# Patient Record
Sex: FEMALE | Race: WHITE | Employment: OTHER | URBAN - METROPOLITAN AREA
[De-identification: names, ages, dates, MRNs, and addresses within clinical notes are randomized per-mention and may not be internally consistent; named-entity substitution may affect disease eponyms.]

---

## 2018-09-17 ENCOUNTER — OFFICE VISIT (OUTPATIENT)
Dept: UROLOGY | Age: 63
End: 2018-09-17
Payer: MEDICARE

## 2018-09-17 VITALS
DIASTOLIC BLOOD PRESSURE: 84 MMHG | HEIGHT: 62 IN | BODY MASS INDEX: 29.81 KG/M2 | HEART RATE: 68 BPM | SYSTOLIC BLOOD PRESSURE: 138 MMHG | TEMPERATURE: 97.6 F | WEIGHT: 162 LBS

## 2018-09-17 DIAGNOSIS — R33.9 INCOMPLETE EMPTYING OF BLADDER: ICD-10-CM

## 2018-09-17 DIAGNOSIS — R35.0 FREQUENCY OF URINATION: ICD-10-CM

## 2018-09-17 DIAGNOSIS — R39.11 HESITANCY: Primary | ICD-10-CM

## 2018-09-17 DIAGNOSIS — R39.15 URGENCY OF URINATION: ICD-10-CM

## 2018-09-17 PROCEDURE — 51798 US URINE CAPACITY MEASURE: CPT | Performed by: UROLOGY

## 2018-09-17 PROCEDURE — 99204 OFFICE O/P NEW MOD 45 MIN: CPT | Performed by: UROLOGY

## 2018-09-17 RX ORDER — TAMSULOSIN HYDROCHLORIDE 0.4 MG/1
0.4 CAPSULE ORAL DAILY
Qty: 30 CAPSULE | Refills: 3 | Status: SHIPPED | OUTPATIENT
Start: 2018-09-17 | End: 2019-01-07 | Stop reason: SDUPTHER

## 2018-09-17 RX ORDER — AMOXICILLIN AND CLAVULANATE POTASSIUM 875; 125 MG/1; MG/1
1 TABLET, FILM COATED ORAL 2 TIMES DAILY
COMMUNITY
End: 2019-08-13

## 2018-09-17 ASSESSMENT — ENCOUNTER SYMPTOMS
WHEEZING: 0
BACK PAIN: 1
COLOR CHANGE: 0
COUGH: 0
EYE REDNESS: 0
EYE PAIN: 0
SHORTNESS OF BREATH: 0
ABDOMINAL PAIN: 0
NAUSEA: 0

## 2018-09-17 NOTE — PROGRESS NOTES
250 MG DR tablet Take 250 mg by mouth 3 times daily      vilazodone HCl (VILAZODONE HCL) 40 MG TABS Take 40 mg by mouth daily      gabapentin (NEURONTIN) 300 MG capsule       butalbital-acetaminophen-caffeine (FIORICET, ESGIC) per tablet Take 1 tablet by mouth every 4 hours as needed.  L-Lysine 500 MG CAPS Take  by mouth.  vitamin E 400 UNIT capsule Take 400 Units by mouth daily.  Multiple Vitamins-Minerals (THERAPEUTIC MULTIVITAMIN-MINERALS) tablet Take 1 tablet by mouth daily.  Dextromethorphan-Guaifenesin (MUCINEX DM MAXIMUM STRENGTH)  MG TB12 Take  by mouth.  amitriptyline (ELAVIL) 10 MG tablet Take 10 mg by mouth nightly as needed.  HYDROcodone-acetaminophen (VICODIN) 5-500 MG per tablet Take 1 tablet by mouth daily as needed.  naproxen (NAPROSYN) 500 MG tablet Take 500 mg by mouth daily as needed.  SUMAtriptan (IMITREX) 100 MG tablet Take 100 mg by mouth once as needed.  ALPRAZolam (XANAX) 0.5 MG tablet Take 0.5 mg by mouth as needed.  hydrOXYzine (ATARAX) 25 MG tablet Take 25 mg by mouth as needed.  zolpidem (AMBIEN) 10 MG tablet Take 10 mg by mouth nightly as needed.  atorvastatin (LIPITOR) 40 MG tablet Take 40 mg by mouth daily.  montelukast (SINGULAIR) 10 MG tablet Take 10 mg by mouth nightly. Latex; Benadryl [diphenhydramine]; Demerol hcl [meperidine]; Percocet [oxycodone-acetaminophen]; Seroquel [quetiapine fumarate]; and Adhesive tape  History   Smoking Status    Never Smoker   Smokeless Tobacco    Never Used      (If patient a smoker, smoking cessation counseling offered)   History   Alcohol Use No       REVIEW OF SYSTEMS:  Review of Systems    Physical Exam:    This a 70 y.o. female      Vitals:    09/17/18 0903   BP: 138/84   Pulse: 68   Temp: 97.6 °F (36.4 °C)     Body mass index is 29.63 kg/m².   Physical Exam  Constitutional: Patient in no acute distress, ggod grooming, appropriately dressed  Neuro: Alert and

## 2018-10-10 ENCOUNTER — PROCEDURE VISIT (OUTPATIENT)
Dept: UROLOGY | Age: 63
End: 2018-10-10
Payer: MEDICARE

## 2018-10-10 VITALS
BODY MASS INDEX: 29.08 KG/M2 | HEART RATE: 89 BPM | HEIGHT: 62 IN | TEMPERATURE: 99.1 F | WEIGHT: 158 LBS | DIASTOLIC BLOOD PRESSURE: 65 MMHG | SYSTOLIC BLOOD PRESSURE: 110 MMHG

## 2018-10-10 DIAGNOSIS — R39.11 HESITANCY: Primary | ICD-10-CM

## 2018-10-10 DIAGNOSIS — R33.9 INCOMPLETE EMPTYING OF BLADDER: ICD-10-CM

## 2018-10-10 DIAGNOSIS — K59.00 CONSTIPATION, UNSPECIFIED CONSTIPATION TYPE: ICD-10-CM

## 2018-10-10 DIAGNOSIS — R39.16 URINARY STRAINING: ICD-10-CM

## 2018-10-10 PROCEDURE — 51741 ELECTRO-UROFLOWMETRY FIRST: CPT | Performed by: UROLOGY

## 2018-10-10 PROCEDURE — 99999 PR OFFICE/OUTPT VISIT,PROCEDURE ONLY: CPT | Performed by: UROLOGY

## 2018-10-10 PROCEDURE — 51728 CYSTOMETROGRAM W/VP: CPT | Performed by: UROLOGY

## 2018-10-10 PROCEDURE — 51784 ANAL/URINARY MUSCLE STUDY: CPT | Performed by: UROLOGY

## 2018-10-10 PROCEDURE — 51797 INTRAABDOMINAL PRESSURE TEST: CPT | Performed by: UROLOGY

## 2018-10-10 RX ORDER — HYDROCHLOROTHIAZIDE 12.5 MG/1
12.5 CAPSULE, GELATIN COATED ORAL DAILY
COMMUNITY
End: 2019-08-13

## 2018-10-10 RX ORDER — LANOLIN ALCOHOL/MO/W.PET/CERES
1 CREAM (GRAM) TOPICAL
COMMUNITY

## 2018-10-10 NOTE — PROGRESS NOTES
Here for Urodynamic testing for hesitancy, difficulty starting her stream, and has to strain and bend over to touch her toes to empty her bladder. Voids every 2-3 hours, nocturia 0-1. Post void dribbling- no pad no other incontinence. Hx Hyst at age 26, was a HRT for a short time, had bladder and rectal repair. Has pain and burning with intercourse. Is constipated, started Miralax this morning. Started Flomax- 9/17/18. Uroflow:  Voided 82 ml. Max flow 4 ml/min. PVR- 20 ml. Urethral and rectal pressure cath placed, gera-rectal EMG patches placed. Urethral caruncle noted, atrophic vaginitis. 1st fill: 40 ml/min  First sensation:91 ml  First desire: 102ml  Strong desire: 117 ml  Capacity: 155 ml. Voided 149 ml, max flow 8 ml/sec, average flow 5 ml/sec, 1 ml PVR. Max detrusor recording 125 cmH2O. Stress at 118 ml produced no leak. 2nd fill:40 ml/min  First sensation: 62 ml  First desire: 99 ml  Strong desire: 145 ml  Capacity: 185 ml. Voided 237 ml, max flow 14 ml/sec, average 8 ml/sec, max Detrusor recording 146 cm H2O. Stress at 126 ml produced no leak.

## 2018-10-15 ENCOUNTER — PROCEDURE VISIT (OUTPATIENT)
Dept: UROLOGY | Age: 63
End: 2018-10-15
Payer: MEDICARE

## 2018-10-15 VITALS
WEIGHT: 158 LBS | BODY MASS INDEX: 29.08 KG/M2 | HEART RATE: 96 BPM | DIASTOLIC BLOOD PRESSURE: 69 MMHG | SYSTOLIC BLOOD PRESSURE: 107 MMHG | TEMPERATURE: 98.4 F | HEIGHT: 62 IN

## 2018-10-15 DIAGNOSIS — R33.9 RETENTION OF URINE: Primary | ICD-10-CM

## 2018-10-15 PROCEDURE — 52000 CYSTOURETHROSCOPY: CPT | Performed by: UROLOGY

## 2018-10-15 PROCEDURE — 99999 PR OFFICE/OUTPT VISIT,PROCEDURE ONLY: CPT | Performed by: UROLOGY

## 2019-01-07 DIAGNOSIS — R33.9 INCOMPLETE EMPTYING OF BLADDER: ICD-10-CM

## 2019-01-07 DIAGNOSIS — R39.11 HESITANCY: ICD-10-CM

## 2019-01-07 RX ORDER — TAMSULOSIN HYDROCHLORIDE 0.4 MG/1
CAPSULE ORAL
Qty: 30 CAPSULE | Refills: 2 | Status: SHIPPED | OUTPATIENT
Start: 2019-01-07 | End: 2019-04-15 | Stop reason: SDUPTHER

## 2019-04-15 DIAGNOSIS — R33.9 INCOMPLETE EMPTYING OF BLADDER: ICD-10-CM

## 2019-04-15 DIAGNOSIS — R39.11 HESITANCY: ICD-10-CM

## 2019-04-15 NOTE — TELEPHONE ENCOUNTER
Patient had to cancel appointment today due to insurance. All information faxed to Lakesha Tejada at [facility] office, patient will run out of medication soon.

## 2019-04-19 RX ORDER — TAMSULOSIN HYDROCHLORIDE 0.4 MG/1
CAPSULE ORAL
Qty: 30 CAPSULE | Refills: 5 | Status: SHIPPED | OUTPATIENT
Start: 2019-04-19 | End: 2020-11-06 | Stop reason: ALTCHOICE

## 2019-10-07 DIAGNOSIS — R33.9 INCOMPLETE EMPTYING OF BLADDER: ICD-10-CM

## 2019-10-07 DIAGNOSIS — R39.11 HESITANCY: ICD-10-CM

## 2019-10-07 RX ORDER — TAMSULOSIN HYDROCHLORIDE 0.4 MG/1
CAPSULE ORAL
Qty: 30 CAPSULE | Refills: 4 | OUTPATIENT
Start: 2019-10-07

## 2019-10-11 DIAGNOSIS — R33.9 INCOMPLETE EMPTYING OF BLADDER: ICD-10-CM

## 2019-10-11 DIAGNOSIS — R39.11 HESITANCY: ICD-10-CM

## 2019-10-11 RX ORDER — TAMSULOSIN HYDROCHLORIDE 0.4 MG/1
CAPSULE ORAL
Qty: 30 CAPSULE | Refills: 4 | OUTPATIENT
Start: 2019-10-11

## 2020-03-25 PROBLEM — K21.9 GERD (GASTROESOPHAGEAL REFLUX DISEASE): Status: RESOLVED | Noted: 2020-03-25 | Resolved: 2020-03-24

## 2020-04-27 ENCOUNTER — OFFICE VISIT (OUTPATIENT)
Dept: PODIATRY | Age: 65
End: 2020-04-27
Payer: MEDICARE

## 2020-04-27 VITALS — BODY MASS INDEX: 27.88 KG/M2 | WEIGHT: 142 LBS | HEIGHT: 60 IN

## 2020-04-27 PROCEDURE — 99203 OFFICE O/P NEW LOW 30 MIN: CPT | Performed by: PODIATRIST

## 2020-04-27 RX ORDER — GABAPENTIN 300 MG/1
100 CAPSULE ORAL DAILY
COMMUNITY
Start: 2020-04-06 | End: 2022-08-17 | Stop reason: DRUGHIGH

## 2020-04-27 ASSESSMENT — ENCOUNTER SYMPTOMS
COLOR CHANGE: 0
NAUSEA: 0
BACK PAIN: 1
DIARRHEA: 0
VOMITING: 0
CONSTIPATION: 0

## 2020-04-27 NOTE — PROGRESS NOTES
[facility]  New Patient History and Physical    Chief Complaint:   Chief Complaint   Patient presents with    Foot Pain     LEFT HALLUX NAILS BECOME PAINFUL/ SHE DID BUMP FOOT AND INJURED LEFT FOOT A FEW YEARS BACK    Other     PCP: LAST VISIT DR Vinicius Gonzales 03/02/2020       HPI: Sarah Gonzalez is a 77 y.o. female who presents to the office today complaining of several things    1) thick, discolored toenail left hallux, has had injury. Some pain, bought some otc medication, has not used it yet  2) pain left 3rd toe, swells, discolored, no trauma. Pain in shoes, tried a toe cap  3) pain right hallux, had a fracture, treated conservatively, feels stiff, some pain. 4) itchy spot plantar left foot, comes and goes. .  Currently denies F/C/N/V. Allergies   Allergen Reactions    Latex     Nickel     Benadryl [Diphenhydramine]     Cefadroxil     Cephalexin Itching    Demerol Hcl [Meperidine]     Erythromycin      Other reaction(s): Unknown (qualifier value)    Lansoprazole      Other reaction(s): Unknown (qualifier value)    Lomefloxacin      Other reaction(s): Unknown (qualifier value)    Loracarbef      Other reaction(s): Unknown (qualifier value)    Meperidine Hcl      Other reaction(s): Hallucinations    Other Other (See Comments)    Percocet [Oxycodone-Acetaminophen]     Pneumococcal Vaccines Other (See Comments)    Seroquel [Quetiapine Fumarate]     Adhesive Tape Rash       Past Medical History:   Diagnosis Date    Abdominal pain     Asthma     Benign hypertension with CKD [facility]chronic kidney disease) stage III ([facility])     CKD (chronic kidney disease) stage 3, GFR 30-59 ml/min ([facility])     GERD (gastroesophageal reflux disease)     Hyperlipidemia     Hypothyroidism     MVP (mitral valve prolapse)     Rectal bleeding     Scarlet fever     Sjogren - Ragini's syndrome        Prior to Admission medications    Medication Sig Start Date End Date Taking?  Authorizing Provider bilateral    Integument: Warm, dry, supple, Bilateral.  Left hallux nail thick, discolored, loose distal Dry scaly patch of hyperkeratotic skin plantar left foot. Radiographs: 3 views right Foot:  Hallux abducto valgus deformity. Degenerative changes at the medial hallux interphalangeal joint. No acute pathology. Radiographs: 3 views left Foot:  Hallux abducto valgus deformity. cystic changes at the distal interphalangeal joint of the left third toe. Asessment: Patient is a 77 y.o. female with:   1. Pain in both feet    2. Onychomycosis    3. Pain of great toe, left    4. Contusion of toe, sequela    5. Osteoarthritis of toe joint, right    6. Osteoarthritis of toe joint, left    7. Digital mucinous cyst of toe of left foot    8. Chronic eczema of foot          Plan: Patient examined and evaluated. Current condition and treatment options discussed in detail. Advised pt to wear good shoes. Verbal and written instructions given to patient. Contact office with any questions/problems/concerns. 1) will use OTC topical for left hallux nail. Debrided nail  2) will try diclofenac gel to left 3rd toe. 3) toe crest discussed for left 3rd toe  4) Rx Lidex cream for left foot    Discussed arthroplasty of the DPJ of the left 3rd toe. Orders Placed This Encounter   Procedures    XR FOOT LEFT (MIN 3 VIEWS)    XR FOOT RIGHT (MIN 3 VIEWS)     Orders Placed This Encounter   Medications    diclofenac sodium (VOLTAREN) 1 % GEL     Sig: Apply 4 g topically 4 times daily     Dispense:  5 Tube     Refill:  0    fluocinonide (LIDEX) 0.05 % cream     Sig: Apply topically 2 times daily.      Dispense:  30 g     Refill:  0        RTC in 4week(s).    4/27/2020

## 2020-06-09 ENCOUNTER — OFFICE VISIT (OUTPATIENT)
Dept: PODIATRY | Age: 65
End: 2020-06-09
Payer: MEDICARE

## 2020-06-09 VITALS — HEIGHT: 60 IN | BODY MASS INDEX: 27.88 KG/M2 | WEIGHT: 142 LBS

## 2020-06-09 PROCEDURE — 99213 OFFICE O/P EST LOW 20 MIN: CPT | Performed by: PODIATRIST

## 2020-06-09 ASSESSMENT — ENCOUNTER SYMPTOMS
VOMITING: 0
BACK PAIN: 1
COLOR CHANGE: 0
CONSTIPATION: 0
DIARRHEA: 0
NAUSEA: 0

## 2020-06-09 NOTE — PROGRESS NOTES
[address] [place] [address] Podiatry  Return Patient History and Physical    Chief Complaint:   Chief Complaint   Patient presents with   Lucie Minder     recheck left foot/Lidex cream and diclofenac follow up, not much help       HPI: Bo Mann is a 77 y.o. female who presents to the office today complaining of several things    1) thick, discolored toenail left hallux, using topical, old nail fell off, doing well. has had injury. Some pain, bought some otc medication, has not used it yet  2) pain left 3rd toe, using topical, no better. swells, discolored, no trauma. Pain in shoes, tried a toe cap  3) pain right hallux, no change, tolerable. had a fracture, treated conservatively, feels stiff, some pain. 4) itchy spot plantar left foot, comes and goes-cream helps. .  Currently denies F/C/N/V. Allergies   Allergen Reactions    Latex     Nickel     Benadryl [Diphenhydramine]     Cefadroxil     Cephalexin Itching    Demerol Hcl [Meperidine]     Erythromycin      Other reaction(s): Unknown (qualifier value)    Lansoprazole      Other reaction(s): Unknown (qualifier value)    Lomefloxacin      Other reaction(s): Unknown (qualifier value)    Loracarbef      Other reaction(s): Unknown (qualifier value)    Meperidine Hcl      Other reaction(s): Hallucinations    Other Other (See Comments)    Percocet [Oxycodone-Acetaminophen]     Pneumococcal Vaccines Other (See Comments)    Seroquel [Quetiapine Fumarate]     Adhesive Tape Rash       Past Medical History:   Diagnosis Date    Abdominal pain     Asthma     Benign [facility] with CKD [facility]chronic [facility]     CKD (chronic kidney disease) stage 3, GFR 30-59 ml/min ([facility])     GERD (gastroesophageal reflux disease)     Hyperlipidemia     Hypothyroidism     MVP (mitral valve prolapse)     Rectal bleeding     Scarlet fever     Sjogren - Ragini's syndrome        Prior to Admission medications    Medication Sig Start Date End Date Taking? SURGERY      Bladder and rectum reconstruction    BREAST SURGERY      COLONOSCOPY  01/22/16     RECALL 10 YRS , RECTAL SCAR BIOPSY COLONIC MUCOSA WITH FOCAL ULCER AND GRANULATION TISSUE     ELBOW SURGERY      EYE SURGERY      bilateral cataract    HYSTERECTOMY      KNEE SURGERY      left    KNEE SURGERY      right    NECK SURGERY      OTHER SURGICAL HISTORY      lower back    RECTAL PROLAPSE REPAIR      SHOULDER SURGERY      SPINE SURGERY      TONSILLECTOMY      TUBAL LIGATION      bilateral    UPPER GASTROINTESTINAL ENDOSCOPY  2007    funal gastric polyp        Family History   Problem Relation Age of Onset    Heart Disease Mother     Stroke Father        Social History     Tobacco Use    Smoking status: Never Smoker    Smokeless tobacco: Never Used   Substance Use Topics    Alcohol use: No       Review of Systems   Constitutional: Negative for chills, diaphoresis, fatigue, fever and unexpected weight change. Cardiovascular: Negative for leg swelling. Gastrointestinal: Negative for constipation, diarrhea, nausea and vomiting. Musculoskeletal: Positive for arthralgias, back pain, gait problem, joint swelling and myalgias. Skin: Negative for color change, pallor, rash and wound. Neurological: Negative for weakness and numbness. Lower Extremity Physical Examination:     Vitals: There were no vitals filed for this visit. General: AAO x 3 in NAD.      Vascular: DP and PT pulses palpable 2/4, Bilateral.  CFT <3 seconds, Bilateral.  Hair growth absent to the level of the digits, Bilateral.  Edema absent, Bilateral.  Varicosities absent, Bilateral. Erythema absent, Bilateral    Neurological:  Sensation present to light touch to level of digits, Bilateral.    Musculoskeletal: Muscle strength 5/5, Bilateral.  Pain present upon palpation of DIPJ 3rd toe, with local swelling, some redness, Left. normal medial longitudinal arch, Bilateral.  Ankle ROM normal,Bilateral.  Limited ROM right

## 2020-09-22 ENCOUNTER — OFFICE VISIT (OUTPATIENT)
Dept: PODIATRY | Age: 65
End: 2020-09-22
Payer: MEDICARE

## 2020-09-22 VITALS — BODY MASS INDEX: 27.88 KG/M2 | HEIGHT: 60 IN | WEIGHT: 142 LBS

## 2020-09-22 PROCEDURE — 99214 OFFICE O/P EST MOD 30 MIN: CPT | Performed by: PODIATRIST

## 2020-09-22 ASSESSMENT — ENCOUNTER SYMPTOMS
NAUSEA: 0
VOMITING: 0
BACK PAIN: 1
CONSTIPATION: 0
DIARRHEA: 0
COLOR CHANGE: 0

## 2020-09-22 NOTE — PROGRESS NOTES
[facility]  Return Patient History and Physical    Chief Complaint:   Chief Complaint   Patient presents with    Follow-up     left foot still painful        HPI: Maximilian Lord is a 77 y.o. female who presents to the office today complaining of several things    1)  pain left 3rd toe, using topical, sometimes feels better. swells, discolored, no trauma. Pain in shoes, tried a toe cap. She has Sjogren, fibromyalgia. Has been tested for autoimmune issues. 2) pain right hallux, no change, tolerable. had a fracture, treated conservatively, feels stiff, some pain. 3) itchy spot plantar left foot, comes and goes-cream helps. .  Currently denies F/C/N/V. Allergies   Allergen Reactions    Latex     Nickel     Benadryl [Diphenhydramine]     Cefadroxil     Cephalexin Itching    Demerol Hcl [Meperidine]     Erythromycin      Other reaction(s): Unknown (qualifier value)    Lansoprazole      Other reaction(s): Unknown (qualifier value)    Lomefloxacin      Other reaction(s): Unknown (qualifier value)    Loracarbef      Other reaction(s): Unknown (qualifier value)    Meperidine Hcl      Other reaction(s): Hallucinations    Other Other (See Comments)    Percocet [Oxycodone-Acetaminophen]     Pneumococcal Vaccines Other (See Comments)    Seroquel [Quetiapine Fumarate]     Adhesive Tape Rash       Past Medical History:   Diagnosis Date    Abdominal pain     Asthma     Benign hypertension with CKD [facility]chronic [facility] disease) stage III [facility])     CKD (chronic kidney disease) stage 3, GFR 30-59 ml/min ([facility])     GERD (gastroesophageal reflux disease)     Hyperlipidemia     Hypothyroidism     MVP (mitral valve prolapse)     Rectal bleeding     Scarlet fever     Sjogren - Ragini's syndrome        Prior to Admission medications    Medication Sig Start Date End Date Taking?  Authorizing Provider   gabapentin (NEURONTIN) 300 MG capsule  4/6/20  Yes Historical Provider, MD   fluocinonide ([address]) 0.05 % cream Apply topically 2 times daily. 4/27/20  Yes Allegra Ya DPM   albuterol sulfate  (90 Base) MCG/ACT inhaler Inhale 2 puffs into the lungs 7/15/19  Yes Historical Provider, MD   benztropine (COGENTIN) 1 MG tablet Take 1 mg by mouth   Yes Historical Provider, MD   calcium carbonate (OYSTER SHELL CALCIUM 500 MG) 1250 (500 Ca) MG tablet Take 1,250 mg by mouth   Yes Historical Provider, MD   estradiol (ESTRACE) 0.1 MG/GM vaginal cream See Admin Instructions 2 times a wek 10/18/18  Yes Historical Provider, MD   lamoTRIgine (LAMICTAL) 25 MG tablet lamotrigine 25 mg tablet   Yes Historical Provider, MD   furosemide (LASIX) 20 MG tablet Take 20 mg by mouth 7/31/19  Yes Historical Provider, MD   Multiple Vitamins-Minerals (EYE VITAMINS & MINERALS) TABS Eye Vitamin and Minerals   Yes Historical Provider, MD   fenofibrate (TRICOR) 145 MG tablet TAKE 1 TABLET BY MOUTH ONE TIME A DAY WITH a MEAL 7/31/19  Yes Historical Provider, MD   hydrOXYzine (ATARAX) 10 MG tablet hydroxyzine HCl 10 mg tablet 11/20/18  Yes Historical Provider, MD Zavala Nipple Oil 500 MG CAPS Take 1 capsule by mouth   Yes Historical Provider, MD   nystatin (MYCOSTATIN) 577627 UNIT/GM powder Apply topically 1/2/19  Yes Historical Provider, MD   nystatin (MYCOSTATIN) 618229 UNIT/ML suspension TAKE 5 MLS BY MOUTH FOUR TIMES A DAY 7/3/19  Yes Historical Provider, MD   omega-3 acid ethyl esters (LOVAZA) 1 g capsule Take 2 g by mouth   Yes Historical Provider, MD   POLYETHYLENE GLYCOL 3350 PO Take 17 g by mouth   Yes Historical Provider, MD   potassium chloride (KLOR-CON M) 20 MEQ extended release tablet potassium chloride ER 20 mEq tablet,extended release(part/cryst) 5/10/19  Yes Historical Provider, MD   Skin Protectants, Misc.  (EUCERIN) cream Apply topically 7/31/19  Yes Historical Provider, MD   TOBRADEX 0.3-0.1 % ophthalmic ointment  7/19/19  Yes Historical Provider, MD   tamsulosin (FLOMAX) 0.4 MG capsule TAKE 1 CAPSULE BY MOUTH ONE TIME A DAY 4/19/19  Yes Bev Watters MD   glucosamine-chondroitin 500-400 MG tablet Take 1 tablet by mouth   Yes Historical Provider, MD   conjugated estrogens (PREMARIN) 0.625 MG/GM vaginal cream Place 0.5 g vaginally twice a week 10/6/16  Yes Magali Tipton MD   levothyroxine (SYNTHROID) 88 MCG tablet 75 mcg  3/16/16  Yes Historical Provider, MD   cyclobenzaprine (FLEXERIL) 10 MG tablet  10/1/15  Yes Historical Provider, MD   vilazodone HCl (VILAZODONE HCL) 40 MG TABS Take 40 mg by mouth daily   Yes Historical Provider, MD   L-Lysine 500 MG CAPS Take  by mouth. Yes Historical Provider, MD   vitamin E 400 UNIT capsule Take 400 Units by mouth daily. Yes Historical Provider, MD   Multiple Vitamins-Minerals (THERAPEUTIC MULTIVITAMIN-MINERALS) tablet Take 1 tablet by mouth daily. Yes Historical Provider, MD   Dextromethorphan-Guaifenesin (MUCINEX DM MAXIMUM STRENGTH)  MG TB12 Take  by mouth. Yes Historical Provider, MD   HYDROcodone-acetaminophen (VICODIN) 5-500 MG per tablet Take 1 tablet by mouth daily as needed. 7/20/13  Yes Historical Provider, MD   naproxen (NAPROSYN) 500 MG tablet Take 500 mg by mouth daily as needed. 7/13/13  Yes Historical Provider, MD   SUMAtriptan (IMITREX) 100 MG tablet Take 100 mg by mouth once as needed. Yes Historical Provider, MD   zolpidem (AMBIEN) 10 MG tablet Take 10 mg by mouth nightly as needed. Yes Historical Provider, MD   atorvastatin (LIPITOR) 40 MG tablet Take 40 mg by mouth daily. Yes Historical Provider, MD   montelukast (SINGULAIR) 10 MG tablet Take 10 mg by mouth nightly.    Yes Historical Provider, MD   diclofenac sodium (VOLTAREN) 1 % GEL Apply 4 g topically 4 times daily 4/27/20 5/27/20  Marlene Stoner DPM       Past Surgical History:   Procedure Laterality Date    BLADDER SURGERY      Bladder and rectum reconstruction    BREAST SURGERY      COLONOSCOPY  01/22/16     RECALL 10 YRS , RECTAL SCAR BIOPSY COLONIC MUCOSA WITH FOCAL ULCER AND GRANULATION TISSUE     ELBOW SURGERY      EYE SURGERY      bilateral cataract    HYSTERECTOMY      KNEE SURGERY      left    KNEE SURGERY      right    NECK SURGERY      OTHER SURGICAL HISTORY      lower back    RECTAL PROLAPSE REPAIR      SHOULDER SURGERY      SPINE SURGERY      TONSILLECTOMY      TUBAL LIGATION      bilateral    UPPER GASTROINTESTINAL ENDOSCOPY  2007    funal gastric polyp        Family History   Problem Relation Age of Onset    Heart Disease Mother     Stroke Father        Social History     Tobacco Use    Smoking status: Never Smoker    Smokeless tobacco: Never Used   Substance Use Topics    Alcohol use: No       Review of Systems   Constitutional: Negative for chills, diaphoresis, fatigue, fever and unexpected weight change. Cardiovascular: Negative for leg swelling. Gastrointestinal: Negative for constipation, diarrhea, nausea and vomiting. Musculoskeletal: Positive for arthralgias, back pain, gait problem, joint swelling and myalgias. Skin: Negative for color change, pallor, rash and wound. Neurological: Negative for weakness and numbness. Lower Extremity Physical Examination:     Vitals: There were no vitals filed for this visit. General: AAO x 3 in NAD. Vascular: DP and PT pulses palpable 2/4, Bilateral.  CFT <3 seconds, Bilateral.  Hair growth absent to the level of the digits, Bilateral.  Edema absent, Bilateral.  Varicosities absent, Bilateral. Erythema absent, Bilateral    Neurological:  Sensation present to light touch to level of digits, Bilateral.    Musculoskeletal: Muscle strength 5/5, Bilateral.  Pain present upon palpation of DIPJ 3rd toe, with local swelling, some redness, Left. normal medial longitudinal arch, Bilateral.  Ankle ROM normal,Bilateral.  Limited ROM right hallux IPJ.  HAV bilateral    Integument: Warm, dry, supple, Bilateral.  Left hallux nail thick, discolored, loose distal Dry scaly patch of hyperkeratotic skin plantar left foot.     Radiographs: 3 views right Foot:  Hallux abducto valgus deformity. Degenerative changes at the medial hallux interphalangeal joint. No acute pathology. Radiographs: 3 views left Foot:  Hallux abducto valgus deformity. cystic changes at the distal interphalangeal joint of the left third toe. Asessment: Patient is a 77 y.o. female with:   1. Digital mucinous cyst of toe of left foot    2. Chronic eczema of foot    3. Osteoarthritis of toe joint, left    4. Osteoarthritis of toe joint, right          Plan: Patient examined and evaluated. Current condition and treatment options discussed in detail. Advised pt to wear good shoes. Verbal and written instructions given to patient. Contact office with any questions/problems/concerns. 1) continue OTC topical for left hallux nail. 2) continue diclofenac gel to left 3rd toe. Discussed DIPJ arthroplasty     I discussed in detail DIPJ arthroplasty and biopsy. He/She was in full agreement and understood risks. The reason for surgery is due to unsuccessful non-operative treatment and/or conservative treatment is not a viable option. It was discussed with the patient that compliance postoperatively is of utmost importance. Any deviation on behalf of the patient will decrease the chances of a successful outcome. Patient acknowledged, understands, and would like to move forward with surgery as discussed. The patient was given a consent outlining the general risk of surgery as well as the specifics to the surgical plan. This was carefully discussed giving all options, indications and contraindications regarding the procedure outlined in the consent. All questions were answered to the patient's satisfaction. The patient signed the consent form confirming complete understanding and acceptance of the risks of stated.  I specifically stated and inquired if the patient understands and accepts the risks of surgery including infection, failure, prolonged pain,

## 2020-09-30 ENCOUNTER — TELEPHONE (OUTPATIENT)
Dept: PODIATRY | Age: 65
End: 2020-09-30

## 2020-10-01 ENCOUNTER — HOSPITAL ENCOUNTER (OUTPATIENT)
Dept: PREADMISSION TESTING | Age: 65
Setting detail: SPECIMEN
Discharge: HOME | End: 2020-10-05
Payer: MEDICARE

## 2020-10-01 PROCEDURE — U0003 INFECTIOUS AGENT DETECTION BY NUCLEIC ACID (DNA OR RNA); SEVERE ACUTE RESPIRATORY SYNDROME CORONAVIRUS 2 (SARS-COV-2) (CORONAVIRUS DISEASE [COVID-19]), AMPLIFIED PROBE TECHNIQUE, MAKING USE OF HIGH THROUGHPUT TECHNOLOGIES AS DESCRIBED BY CMS-2020-01-R: HCPCS

## 2020-10-02 ENCOUNTER — TELEPHONE (OUTPATIENT)
Dept: PODIATRY | Age: 65
End: 2020-10-02

## 2020-10-02 LAB — SARS-COV-2, NAA: NOT DETECTED

## 2020-10-20 ENCOUNTER — OFFICE VISIT (OUTPATIENT)
Dept: PODIATRY | Age: 65
End: 2020-10-20
Payer: MEDICARE

## 2020-10-20 VITALS — BODY MASS INDEX: 27.88 KG/M2 | HEIGHT: 60 IN | WEIGHT: 142 LBS

## 2020-10-20 PROCEDURE — 99214 OFFICE O/P EST MOD 30 MIN: CPT | Performed by: PODIATRIST

## 2020-10-20 ASSESSMENT — ENCOUNTER SYMPTOMS
BACK PAIN: 1
VOMITING: 0
NAUSEA: 0
DIARRHEA: 0
COLOR CHANGE: 0
CONSTIPATION: 0

## 2020-10-20 NOTE — PROGRESS NOTES
the lungs 7/15/19  Yes Historical Provider, MD   benztropine (COGENTIN) 1 MG tablet Take 1 mg by mouth   Yes Historical Provider, MD   calcium carbonate (OYSTER SHELL CALCIUM 500 MG) 1250 (500 Ca) MG tablet Take 1,250 mg by mouth   Yes Historical Provider, MD   estradiol (ESTRACE) 0.1 MG/GM vaginal cream See Admin Instructions 2 times a wek 10/18/18  Yes Historical Provider, MD   lamoTRIgine (LAMICTAL) 25 MG tablet lamotrigine 25 mg tablet   Yes Historical Provider, MD   Multiple Vitamins-Minerals (EYE VITAMINS & MINERALS) TABS Eye Vitamin and Minerals   Yes Historical Provider, MD   fenofibrate (TRICOR) 145 MG tablet TAKE 1 TABLET BY MOUTH ONE TIME A DAY WITH a MEAL 7/31/19  Yes Historical Provider, MD   hydrOXYzine (ATARAX) 10 MG tablet hydroxyzine HCl 10 mg tablet 11/20/18  Yes Historical Provider, MD Dobson Fuentes Oil 500 MG CAPS Take 1 capsule by mouth   Yes Historical Provider, MD   nystatin (MYCOSTATIN) 597445 UNIT/GM powder Apply topically 1/2/19  Yes Historical Provider, MD   nystatin (MYCOSTATIN) 098267 UNIT/ML suspension TAKE 5 MLS BY MOUTH FOUR TIMES A DAY 7/3/19  Yes Historical Provider, MD   omega-3 acid ethyl esters (LOVAZA) 1 g capsule Take 2 g by mouth   Yes Historical Provider, MD   POLYETHYLENE GLYCOL 3350 PO Take 17 g by mouth   Yes Historical Provider, MD   potassium chloride (KLOR-CON M) 20 MEQ extended release tablet potassium chloride ER 20 mEq tablet,extended release(part/cryst) 5/10/19  Yes Historical Provider, MD   Skin Protectants, Misc.  (EUCERIN) cream Apply topically 7/31/19  Yes Historical Provider, MD   TOBRADEX 0.3-0.1 % ophthalmic ointment  7/19/19  Yes Historical Provider, MD   tamsulosin (FLOMAX) 0.4 MG capsule TAKE 1 CAPSULE BY MOUTH ONE TIME A DAY 4/19/19  Yes Justen Boateng MD   glucosamine-chondroitin 500-400 MG tablet Take 1 tablet by mouth   Yes Historical Provider, MD   conjugated estrogens (PREMARIN) 0.625 MG/GM vaginal cream Place 0.5 g vaginally twice a week 10/6/16  Yes Monae Caro MD   levothyroxine (SYNTHROID) 88 MCG tablet 75 mcg  3/16/16  Yes Historical Provider, MD   cyclobenzaprine (FLEXERIL) 10 MG tablet  10/1/15  Yes Historical Provider, MD   vilazodone HCl (VILAZODONE HCL) 40 MG TABS Take 40 mg by mouth daily   Yes Historical Provider, MD   L-Lysine 500 MG CAPS Take  by mouth. Yes Historical Provider, MD   vitamin E 400 UNIT capsule Take 400 Units by mouth daily. Yes Historical Provider, MD   Multiple Vitamins-Minerals (THERAPEUTIC MULTIVITAMIN-MINERALS) tablet Take 1 tablet by mouth daily. Yes Historical Provider, MD   Dextromethorphan-Guaifenesin (MUCINEX DM MAXIMUM STRENGTH)  MG TB12 Take  by mouth. Yes Historical Provider, MD   HYDROcodone-acetaminophen (VICODIN) 5-500 MG per tablet Take 1 tablet by mouth daily as needed. 7/20/13  Yes Historical Provider, MD   naproxen (NAPROSYN) 500 MG tablet Take 500 mg by mouth daily as needed. 7/13/13  Yes Historical Provider, MD   SUMAtriptan (IMITREX) 100 MG tablet Take 100 mg by mouth once as needed. Yes Historical Provider, MD   zolpidem (AMBIEN) 10 MG tablet Take 10 mg by mouth nightly as needed. Yes Historical Provider, MD   atorvastatin (LIPITOR) 40 MG tablet Take 40 mg by mouth daily. Yes Historical Provider, MD   montelukast (SINGULAIR) 10 MG tablet Take 10 mg by mouth nightly.    Yes Historical Provider, MD   diclofenac sodium (VOLTAREN) 1 % GEL Apply 4 g topically 4 times daily 4/27/20 5/27/20  Isatu Olivares DPM       Past Surgical History:   Procedure Laterality Date    BLADDER SURGERY      Bladder and rectum reconstruction    BREAST SURGERY      COLONOSCOPY  01/22/16     RECALL 10 YRS , RECTAL SCAR BIOPSY COLONIC MUCOSA WITH FOCAL ULCER AND GRANULATION TISSUE     ELBOW SURGERY      EYE SURGERY      bilateral cataract    HYSTERECTOMY      KNEE SURGERY      left    KNEE SURGERY      right    NECK SURGERY      OTHER SURGICAL HISTORY      lower back    RECTAL PROLAPSE REPAIR      the distal interphalangeal joint of the left third toe. Asessment: Patient is a 77 y.o. female with:   1. Digital mucinous cyst of toe of left foot    2. Osteoarthritis of toe joint, left    3. Osteoarthritis of toe joint, right          Plan: Patient examined and evaluated. Current condition and treatment options discussed in detail. Advised pt to wear good shoes. Verbal and written instructions given to patient. Contact office with any questions/problems/concerns. 1) continue OTC topical for left hallux nail. 2) continue diclofenac gel to left 3rd toe. Discussed DIPJ arthroplasty     I discussed in detail DIPJ arthroplasty and biopsy again today, in detail. He/She was in full agreement and understood risks. The reason for surgery is due to unsuccessful non-operative treatment and/or conservative treatment is not a viable option. It was discussed with the patient that compliance postoperatively is of utmost importance. Any deviation on behalf of the patient will decrease the chances of a successful outcome. Patient acknowledged, understands, and would like to move forward with surgery as discussed. The patient was given a consent outlining the general risk of surgery as well as the specifics to the surgical plan. This was carefully discussed giving all options, indications and contraindications regarding the procedure outlined in the consent. All questions were answered to the patient's satisfaction. The patient signed the consent form confirming complete understanding and acceptance of the risks of stated. I specifically stated and inquired if the patient understands and accepts the risks of surgery including infection, failure, prolonged pain, swelling, numbness, recurrence, limited mobility,painful scar, RSDS, overcorrection, under-correction, and loss of limb/life.  Death, bleeding, blood clots in the veins or lungs, tendon or blood vessel disturbance, bony conditions, continued pain,stiffness, weakness, and limited function. These were all listed on the consent. Additionally, the postoperative course and treatment was outlined for the patient. Discussion consisted of postoperatively the patient needs to keep the foot elevated for at least the first initial two weeks. I have encouraged movements such as moving from the bed to the sofa or recliner, to the kitchen and the bathroom; quick bursts of movement with the foot elevated. Longstanding periods of time such as cooking, cleaning, and shopping are not permitted. I reminded the patient that there are only two reasons to have surgery. That being, if their function is impaired and also if they are having pain. If they can answer yes to both these questions, I will move forward with surgery. If they can not, there is no reason to proceed with surgical intervention. 3) toe crest discussed for left 3rd toe  4) use Lidex cream for left foot as needed        No orders of the defined types were placed in this encounter. No orders of the defined types were placed in this encounter.        RTC in for surgery  10/20/2020

## 2020-11-06 ENCOUNTER — HOSPITAL ENCOUNTER (OUTPATIENT)
Dept: PREADMISSION TESTING | Age: 65
Discharge: HOME | End: 2020-11-10
Payer: MEDICARE

## 2020-11-06 VITALS
OXYGEN SATURATION: 97 % | HEIGHT: 60 IN | RESPIRATION RATE: 18 BRPM | SYSTOLIC BLOOD PRESSURE: 145 MMHG | TEMPERATURE: 97.5 F | BODY MASS INDEX: 27.48 KG/M2 | WEIGHT: 140 LBS | HEART RATE: 87 BPM | DIASTOLIC BLOOD PRESSURE: 65 MMHG

## 2020-11-06 LAB
ABSOLUTE EOS #: 0.85 K/UL (ref 0–0.4)
ABSOLUTE IMMATURE GRANULOCYTE: ABNORMAL K/UL (ref 0–0.3)
ABSOLUTE LYMPH #: 1.48 K/UL (ref 1–4.8)
ABSOLUTE MONO #: 0.42 K/UL (ref 0.1–1.3)
ANION GAP SERPL CALCULATED.3IONS-SCNC: 9 MMOL/L (ref 9–17)
BASOPHILS # BLD: 0 % (ref 0–2)
BASOPHILS ABSOLUTE: 0 K/UL (ref 0–0.2)
BUN BLDV-MCNC: 19 MG/DL (ref 8–23)
BUN/CREAT BLD: ABNORMAL (ref 9–20)
CALCIUM SERPL-MCNC: 9.2 MG/DL (ref 8.6–10.4)
CHLORIDE BLD-SCNC: 111 MMOL/L (ref 98–107)
CO2: 26 MMOL/L (ref 20–31)
CREAT SERPL-MCNC: 1.23 MG/DL (ref 0.5–0.9)
DIFFERENTIAL TYPE: ABNORMAL
EOSINOPHILS RELATIVE PERCENT: 16 % (ref 0–4)
GFR AFRICAN AMERICAN: 53 ML/MIN
GFR NON-AFRICAN AMERICAN: 44 ML/MIN
GFR SERPL CREATININE-BSD FRML MDRD: ABNORMAL ML/MIN/{1.73_M2}
GFR SERPL CREATININE-BSD FRML MDRD: ABNORMAL ML/MIN/{1.73_M2}
GLUCOSE BLD-MCNC: 90 MG/DL (ref 70–99)
HCT VFR BLD CALC: 33.8 % (ref 36–46)
HEMOGLOBIN: 11.3 G/DL (ref 12–16)
IMMATURE GRANULOCYTES: ABNORMAL %
LYMPHOCYTES # BLD: 28 % (ref 24–44)
MCH RBC QN AUTO: 28.9 PG (ref 26–34)
MCHC RBC AUTO-ENTMCNC: 33.4 G/DL (ref 31–37)
MCV RBC AUTO: 86.4 FL (ref 80–100)
MONOCYTES # BLD: 8 % (ref 1–7)
MORPHOLOGY: ABNORMAL
NRBC AUTOMATED: ABNORMAL PER 100 WBC
PDW BLD-RTO: 15.3 % (ref 11.5–14.9)
PLATELET # BLD: 299 K/UL (ref 150–450)
PLATELET ESTIMATE: ABNORMAL
PMV BLD AUTO: 8.8 FL (ref 6–12)
POTASSIUM SERPL-SCNC: 4.2 MMOL/L (ref 3.7–5.3)
RBC # BLD: 3.91 M/UL (ref 4–5.2)
RBC # BLD: ABNORMAL 10*6/UL
SEG NEUTROPHILS: 48 % (ref 36–66)
SEGMENTED NEUTROPHILS ABSOLUTE COUNT: 2.55 K/UL (ref 1.3–9.1)
SODIUM BLD-SCNC: 146 MMOL/L (ref 135–144)
WBC # BLD: 5.3 K/UL (ref 3.5–11)
WBC # BLD: ABNORMAL 10*3/UL

## 2020-11-06 PROCEDURE — 85025 COMPLETE CBC W/AUTO DIFF WBC: CPT

## 2020-11-06 PROCEDURE — 36415 COLL VENOUS BLD VENIPUNCTURE: CPT

## 2020-11-06 PROCEDURE — 80048 BASIC METABOLIC PNL TOTAL CA: CPT

## 2020-11-06 PROCEDURE — 93005 ELECTROCARDIOGRAM TRACING: CPT | Performed by: ANESTHESIOLOGY

## 2020-11-06 RX ORDER — FLUTICASONE PROPIONATE 110 UG/1
1 AEROSOL, METERED RESPIRATORY (INHALATION) 2 TIMES DAILY
COMMUNITY
End: 2022-06-01

## 2020-11-06 RX ORDER — CETIRIZINE HYDROCHLORIDE 10 MG/1
10 TABLET ORAL DAILY
COMMUNITY
End: 2022-03-23 | Stop reason: ALTCHOICE

## 2020-11-06 RX ORDER — BENZONATATE 100 MG/1
100 CAPSULE ORAL 3 TIMES DAILY PRN
Status: ON HOLD | COMMUNITY
End: 2022-04-07 | Stop reason: HOSPADM

## 2020-11-06 RX ORDER — FUROSEMIDE 20 MG/1
20 TABLET ORAL DAILY
Status: ON HOLD | COMMUNITY
End: 2020-11-20

## 2020-11-06 ASSESSMENT — PAIN DESCRIPTION - PAIN TYPE: TYPE: ACUTE PAIN

## 2020-11-06 ASSESSMENT — PAIN DESCRIPTION - LOCATION: LOCATION: TOE (COMMENT WHICH ONE)

## 2020-11-06 ASSESSMENT — PAIN SCALES - GENERAL: PAINLEVEL_OUTOF10: 2

## 2020-11-06 ASSESSMENT — PAIN DESCRIPTION - ORIENTATION: ORIENTATION: LEFT

## 2020-11-06 ASSESSMENT — PAIN DESCRIPTION - DESCRIPTORS: DESCRIPTORS: ACHING

## 2020-11-06 NOTE — H&P (VIEW-ONLY)
HISTORY and [facility]       NAME:  Fátima Redding  MRN: [id number]   YOB: 1955   Date: 11/6/2020   Age: 77 y.o. Gender: female       COMPLAINT AND PRESENT HISTORY:     Fátima Redding is 77 y.o.,  female, here for pre admission testing for TOE HAMMER REPAIR 3RD, DIPJ W/BONE BIOPSY Left. Pt has history of  PAIN/HAMMERTOE 3RD LEFT TOE. Pt reports, she notice pain in her left 3rd toe long time ago with swelling and redness, but it start to get worse. Pt describe the pain as throbbing pain with walking. Pain rated 8/10 with walking, 0/10 with resting. Pt reports, it is very painful to wear he shoes. . she denies trauma or injury. Pt denies fever/chills, chest pain or SOB, no headache or palpitation . Pt denies any problem with anesthesia , no hx infection MRSA. Pt's PMH; Sjogren, fibromyalgia. Has been tested for autoimmune issues, arthritis, asthma, CKD, hypothyroidism, and hyperlipidemia.       PAST MEDICAL HISTORY     Past Medical History:   Diagnosis Date    Abdominal pain     Arthritis     Asthma     Benign hypertension with CKD (chronic kidney disease) stage III     CKD (chronic kidney disease) stage 3, GFR 30-59 ml/min     GERD (gastroesophageal reflux disease)     Hyperlipidemia     Hypothyroidism     MVP (mitral valve prolapse)     Rectal bleeding     Scarlet fever     Sjogren - Ragini's syndrome        SURGICAL HISTORY       Past Surgical History:   Procedure Laterality Date    APPENDECTOMY      BLADDER SURGERY      Bladder and rectum reconstruction    BREAST SURGERY Left     biopsy    COLONOSCOPY  01/22/16     RECALL 10 YRS , RECTAL SCAR BIOPSY COLONIC MUCOSA WITH FOCAL ULCER AND GRANULATION TISSUE     DENTAL SURGERY      complete teeth extraction    ELBOW SURGERY      EYE SURGERY      bilateral cataract    HYSTERECTOMY      KNEE SURGERY      left    KNEE SURGERY      right    NECK SURGERY      OTHER SURGICAL HISTORY lower back    RECTAL PROLAPSE REPAIR      SHOULDER SURGERY      SPINE SURGERY      TONSILLECTOMY      TUBAL LIGATION      bilateral    UPPER GASTROINTESTINAL ENDOSCOPY  2007    funal gastric polyp        FAMILY HISTORY       Family History   Problem Relation Age of Onset    Heart Disease Mother     Stroke Father        SOCIAL HISTORY       Social History     Socioeconomic History    Marital status:      Spouse name: None    Number of children: None    Years of education: None    Highest education level: None   Occupational History    None   Social Needs    Financial resource strain: None    Food insecurity     Worry: None     Inability: None    Transportation needs     Medical: None     Non-medical: None   Tobacco Use    Smoking status: Never Smoker    Smokeless tobacco: Never Used   Substance and Sexual Activity    Alcohol use: No    Drug use: No    Sexual activity: Never   Lifestyle    Physical activity     Days per week: None     Minutes per session: None    Stress: None   Relationships    Social connections     Talks on phone: None     Gets together: None     Attends Hindu service: None     Active member of club or organization: None     Attends meetings of clubs or organizations: None     Relationship status: None    Intimate partner violence     Fear of current or ex partner: None     Emotionally abused: None     Physically abused: None     Forced sexual activity: None   Other Topics Concern    None   Social History Narrative    None           REVIEW OF SYSTEMS      Allergies   Allergen Reactions    Latex     Nickel     Benadryl [Diphenhydramine]     Cefadroxil     Cephalexin Itching    Demerol Hcl [Meperidine]     Erythromycin      Other reaction(s): Unknown (qualifier value)    Lansoprazole      Other reaction(s): Unknown (qualifier value)    Lomefloxacin      Other reaction(s): Unknown (qualifier value)    Loracarbef      Other reaction(s): Unknown (qualifier Apply topically      glucosamine-chondroitin 500-400 MG tablet Take 1 tablet by mouth      conjugated estrogens (PREMARIN) 0.625 MG/GM vaginal cream Place 0.5 g vaginally twice a week 1 Tube 6    Levothyroxine Sodium 75 MCG CAPS Take 75 mcg by mouth Daily       cyclobenzaprine (FLEXERIL) 10 MG tablet       vilazodone HCl (VILAZODONE HCL) 40 MG TABS Take 40 mg by mouth daily      L-Lysine 500 MG CAPS Take  by mouth.  vitamin E 400 UNIT capsule Take 400 Units by mouth daily.  Multiple Vitamins-Minerals (THERAPEUTIC MULTIVITAMIN-MINERALS) tablet Take 1 tablet by mouth daily.  Dextromethorphan-Guaifenesin (MUCINEX DM MAXIMUM STRENGTH)  MG TB12 Take  by mouth.  HYDROcodone-acetaminophen (NORCO) 5-325 MG per tablet Take 1 tablet by mouth daily as needed.  naproxen (NAPROSYN) 500 MG tablet Take 500 mg by mouth daily as needed.  SUMAtriptan (IMITREX) 100 MG tablet Take 100 mg by mouth once as needed.  zolpidem (AMBIEN) 10 MG tablet Take 10 mg by mouth nightly as needed.  atorvastatin (LIPITOR) 40 MG tablet Take 40 mg by mouth daily.  montelukast (SINGULAIR) 10 MG tablet Take 10 mg by mouth nightly. No current facility-administered medications on file prior to encounter. Negative except for what is mentioned in the HPI. GENERAL PHYSICAL EXAM     Vitals: BP (!) 145/65   Pulse 87   Temp 97.5 °F (36.4 °C) (Infrared)   Resp 18   Ht 5' (1.524 m)   Wt 140 lb (63.5 kg)   LMP  (LMP Unknown)   SpO2 97%   BMI 27.34 kg/m²  Body mass index is 27.34 kg/m². GENERAL APPEARANCE:   Day Alegre is 77 y.o.,  female, not obese, nourished, conscious, alert. Does not appear to be distress or pain at this time. SKIN:  Warm, dry, no cyanosis or jaundice. HEAD:  Normocephalic, atraumatic, no swelling or tenderness. EYES:  Pupils equal, reactive to light.            EARS:  No discharge, no marked hearing loss. NOSE:  No rhinorrhea, epistaxis or septal deformity. THROAT:  Not congested. No ulceration bleeding or discharge. NECK:  No stiffness, trachea central.  No palpable masses or L.N.                 CHEST:  Symmetrical and equal on expansion. HEART:  RRR S1 > S2. No audible murmurs or gallops. LUNGS:  Equal on expansion, normal breath sounds. No adventitious sounds. ABDOMEN: Soft on palpation. No dysphagia, No localized tenderness. No guarding or rigidity. No palpable hepatosplenomegaly. LYMPHATICS:  No palpable cervical lymphadenopathy. LOCOMOTOR, BACK AND SPINE:  No tenderness or deformities. EXTREMITIES:  Symmetrical, no pretibial edema. Tenderness with palpation of the left DIP J 3rd toe with swelling, and redness. [place]dy  NEUROLOGIC:  The patient is conscious, alert, oriented,Cranial nerve II-XII intact, taste and smell were not examined. No apparent focal sensory or motor deficits.              PROVISIONAL DIAGNOSES / SURGERY:    PAIN/HAMMER TOE 3RD LEFT TOE  TOE HAMMER REPAIR 3RD, DIPJ W/BONE BIOPSY Left   Patient Active Problem List    Diagnosis Date Noted    Benign hypertension with CKD (chronic kidney disease) stage III     CKD (chronic kidney disease) stage 3, GFR 30-59 ml/min     Gastroesophageal reflux disease without esophagitis 02/22/2016    Lower abdominal pain 02/22/2016    Constipation 02/22/2016    Rectal prolapse 02/22/2016    Abdominal pain     Rectal bleeding     Menopause 07/24/2014           LULÚ Vásquez CNP on 11/6/2020 at 2:40 PM

## 2020-11-06 NOTE — PROGRESS NOTES
Dr. Linda Butts, anesthesia, was contacted and informed of patient's history and planned surgery. Orders received and no clearance required.

## 2020-11-06 NOTE — H&P
HISTORY and [facility]       NAME:  Yumiko Miguel  MRN: [id number]   YOB: 1955   Date: 11/6/2020   Age: 77 y.o. Gender: female       COMPLAINT AND PRESENT HISTORY:     Yumiko Miguel is 77 y.o.,  female, here for pre admission testing for TOE HAMMER REPAIR 3RD, DIPJ W/BONE BIOPSY Left. Pt has history of  PAIN/HAMMERTOE 3RD LEFT TOE. Pt reports, she notice pain in her left 3rd toe long time ago with swelling and redness, but it start to get worse. Pt describe the pain as throbbing pain with walking. Pain rated 8/10 with walking, 0/10 with resting. Pt reports, it is very painful to wear he shoes. . she denies trauma or injury. Pt denies fever/chills, chest pain or SOB, no headache or palpitation . Pt denies any problem with anesthesia , no hx infection MRSA. Pt's PMH; Sjogren, fibromyalgia. Has been tested for autoimmune issues, arthritis, asthma, CKD, hypothyroidism, and hyperlipidemia.       PAST MEDICAL HISTORY     Past Medical History:   Diagnosis Date    Abdominal pain     Arthritis     Asthma     Benign hypertension with CKD (chronic kidney disease) stage III     CKD (chronic kidney disease) stage 3, GFR 30-59 ml/min     GERD (gastroesophageal reflux disease)     Hyperlipidemia     Hypothyroidism     MVP (mitral valve prolapse)     Rectal bleeding     Scarlet fever     Sjogren - Ragini's syndrome        SURGICAL HISTORY       Past Surgical History:   Procedure Laterality Date    APPENDECTOMY      BLADDER SURGERY      Bladder and rectum reconstruction    BREAST SURGERY Left     biopsy    COLONOSCOPY  01/22/16     RECALL 10 YRS , RECTAL SCAR BIOPSY COLONIC MUCOSA WITH FOCAL ULCER AND GRANULATION TISSUE     DENTAL SURGERY      complete teeth extraction    ELBOW SURGERY      EYE SURGERY      bilateral cataract    HYSTERECTOMY      KNEE SURGERY      left    KNEE SURGERY      right    NECK SURGERY      OTHER SURGICAL HISTORY value)    Meperidine Hcl      Other reaction(s): Hallucinations    Other Other (See Comments)    Percocet [Oxycodone-Acetaminophen]     Pneumococcal Vaccines Other (See Comments)    Seroquel [Quetiapine Fumarate]     Adhesive Tape Rash       Current Outpatient Medications on File Prior to Encounter   Medication Sig Dispense Refill    benzonatate (TESSALON) 100 MG capsule Take 100 mg by mouth 3 times daily as needed for Cough      cetirizine (ZYRTEC) 10 MG tablet Take 10 mg by mouth daily      fluticasone (FLOVENT HFA) 110 MCG/ACT inhaler Inhale 1 puff into the lungs 2 times daily      furosemide (LASIX) 20 MG tablet Take 20 mg by mouth daily      gabapentin (NEURONTIN) 300 MG capsule Take 100 mg by mouth daily.  diclofenac sodium (VOLTAREN) 1 % GEL Apply 4 g topically 4 times daily 5 Tube 0    fluocinonide (LIDEX) 0.05 % cream Apply topically 2 times daily. 30 g 0    albuterol sulfate  (90 Base) MCG/ACT inhaler Inhale 2 puffs into the lungs      benztropine (COGENTIN) 1 MG tablet Take 1.5 mg by mouth daily       calcium carbonate (OYSTER SHELL CALCIUM 500 MG) 1250 (500 Ca) MG tablet Take 1,250 mg by mouth      estradiol (ESTRACE) 0.1 MG/GM vaginal cream See Admin Instructions 2 times a wek      lamoTRIgine (LAMICTAL) 25 MG tablet lamotrigine 25 mg tablet      Multiple Vitamins-Minerals (EYE VITAMINS & MINERALS) TABS Eye Vitamin and Minerals      fenofibrate (TRICOR) 145 MG tablet TAKE 1 TABLET BY MOUTH ONE TIME A DAY WITH a MEAL  3    hydrOXYzine (ATARAX) 10 MG tablet hydroxyzine HCl 10 mg tablet      Krill Oil 500 MG CAPS Take 1 capsule by mouth      nystatin (MYCOSTATIN) 286132 UNIT/GM powder Apply topically      omega-3 acid ethyl esters (LOVAZA) 1 g capsule Take 2 g by mouth      POLYETHYLENE GLYCOL 3350 PO Take 17 g by mouth      potassium chloride (KLOR-CON M) 10 MEQ extended release tablet Take 10 mEq by mouth 2 times daily       Skin Protectants, Misc.  (EUCERIN) cream Apply topically      glucosamine-chondroitin 500-400 MG tablet Take 1 tablet by mouth      conjugated estrogens (PREMARIN) 0.625 MG/GM vaginal cream Place 0.5 g vaginally twice a week 1 Tube 6    Levothyroxine Sodium 75 MCG CAPS Take 75 mcg by mouth Daily       cyclobenzaprine (FLEXERIL) 10 MG tablet       vilazodone HCl (VILAZODONE HCL) 40 MG TABS Take 40 mg by mouth daily      L-Lysine 500 MG CAPS Take  by mouth.  vitamin E 400 UNIT capsule Take 400 Units by mouth daily.  Multiple Vitamins-Minerals (THERAPEUTIC MULTIVITAMIN-MINERALS) tablet Take 1 tablet by mouth daily.  Dextromethorphan-Guaifenesin (MUCINEX DM MAXIMUM STRENGTH)  MG TB12 Take  by mouth.  HYDROcodone-acetaminophen (NORCO) 5-325 MG per tablet Take 1 tablet by mouth daily as needed.  naproxen (NAPROSYN) 500 MG tablet Take 500 mg by mouth daily as needed.  SUMAtriptan (IMITREX) 100 MG tablet Take 100 mg by mouth once as needed.  zolpidem (AMBIEN) 10 MG tablet Take 10 mg by mouth nightly as needed.  atorvastatin (LIPITOR) 40 MG tablet Take 40 mg by mouth daily.  montelukast (SINGULAIR) 10 MG tablet Take 10 mg by mouth nightly. No current facility-administered medications on file prior to encounter. Negative except for what is mentioned in the HPI. GENERAL PHYSICAL EXAM     Vitals: BP (!) 145/65   Pulse 87   Temp 97.5 °F (36.4 °C) (Infrared)   Resp 18   Ht 5' (1.524 m)   Wt 140 lb (63.5 kg)   LMP  (LMP Unknown)   SpO2 97%   BMI 27.34 kg/m²  Body mass index is 27.34 kg/m². GENERAL APPEARANCE:   Gianna Donnelyl is 77 y.o.,  female, not obese, nourished, conscious, alert. Does not appear to be distress or pain at this time. SKIN:  Warm, dry, no cyanosis or jaundice. HEAD:  Normocephalic, atraumatic, no swelling or tenderness. EYES:  Pupils equal, reactive to light.            EARS:  No discharge, no marked hearing loss. NOSE:  No rhinorrhea, epistaxis or septal deformity. THROAT:  Not congested. No ulceration bleeding or discharge. NECK:  No stiffness, trachea central.  No palpable masses or L.N.                 CHEST:  Symmetrical and equal on expansion. HEART:  RRR S1 > S2. No audible murmurs or gallops. LUNGS:  Equal on expansion, normal breath sounds. No adventitious sounds. ABDOMEN: Soft on palpation. No dysphagia, No localized tenderness. No guarding or rigidity. No palpable hepatosplenomegaly. LYMPHATICS:  No palpable cervical lymphadenopathy. LOCOMOTOR, BACK AND SPINE:  No tenderness or deformities. EXTREMITIES:  Symmetrical, no pretibial edema. Tenderness with palpation of the left DIP J 3rd toe with swelling, and redness. [place] NEUROLOGIC:  The patient is conscious, alert, oriented,Cranial nerve II-XII intact, taste and smell were not examined. No apparent focal sensory or motor deficits.              PROVISIONAL DIAGNOSES / SURGERY:    PAIN/HAMMER TOE 3RD LEFT TOE  TOE HAMMER REPAIR 3RD, DIPJ W/BONE BIOPSY Left   Patient Active Problem List    Diagnosis Date Noted    Benign hypertension with CKD (chronic kidney disease) stage III     CKD (chronic kidney disease) stage 3, GFR 30-59 ml/min     Gastroesophageal reflux disease without esophagitis 02/22/2016    Lower abdominal pain 02/22/2016    Constipation 02/22/2016    Rectal prolapse 02/22/2016    Abdominal pain     Rectal bleeding     Menopause 07/24/2014           LULÚ Vásquez CNP on 11/6/2020 at 2:40 PM

## 2020-11-08 LAB
EKG ATRIAL RATE: 82 BPM
EKG P AXIS: 73 DEGREES
EKG P-R INTERVAL: 152 MS
EKG Q-T INTERVAL: 342 MS
EKG QRS DURATION: 86 MS
EKG QTC CALCULATION (BAZETT): 399 MS
EKG R AXIS: 10 DEGREES
EKG T AXIS: 46 DEGREES
EKG VENTRICULAR RATE: 82 BPM

## 2020-11-08 PROCEDURE — 93010 ELECTROCARDIOGRAM REPORT: CPT | Performed by: INTERNAL MEDICINE

## 2020-11-16 ENCOUNTER — HOSPITAL ENCOUNTER (OUTPATIENT)
Dept: PREADMISSION TESTING | Age: 65
Setting detail: SPECIMEN
Discharge: HOME | End: 2020-11-20
Payer: MEDICARE

## 2020-11-16 PROCEDURE — U0003 INFECTIOUS AGENT DETECTION BY NUCLEIC ACID (DNA OR RNA); SEVERE ACUTE RESPIRATORY SYNDROME CORONAVIRUS 2 (SARS-COV-2) (CORONAVIRUS DISEASE [COVID-19]), AMPLIFIED PROBE TECHNIQUE, MAKING USE OF HIGH THROUGHPUT TECHNOLOGIES AS DESCRIBED BY CMS-2020-01-R: HCPCS

## 2020-11-19 ENCOUNTER — ANESTHESIA EVENT (OUTPATIENT)
Dept: OPERATING ROOM | Age: 65
End: 2020-11-19
Payer: MEDICARE

## 2020-11-19 LAB — SARS-COV-2, NAA: NOT DETECTED

## 2020-11-20 ENCOUNTER — HOSPITAL ENCOUNTER (OUTPATIENT)
Age: 65
Setting detail: OUTPATIENT SURGERY
Discharge: HOME | End: 2020-11-20
Attending: PODIATRIST | Admitting: PODIATRIST
Payer: MEDICARE

## 2020-11-20 ENCOUNTER — ANESTHESIA (OUTPATIENT)
Dept: OPERATING ROOM | Age: 65
End: 2020-11-20
Payer: MEDICARE

## 2020-11-20 VITALS — SYSTOLIC BLOOD PRESSURE: 94 MMHG | TEMPERATURE: 98.6 F | DIASTOLIC BLOOD PRESSURE: 50 MMHG | OXYGEN SATURATION: 100 %

## 2020-11-20 VITALS
BODY MASS INDEX: 27.48 KG/M2 | RESPIRATION RATE: 13 BRPM | HEIGHT: 60 IN | WEIGHT: 140 LBS | TEMPERATURE: 97.1 F | OXYGEN SATURATION: 97 % | SYSTOLIC BLOOD PRESSURE: 135 MMHG | HEART RATE: 74 BPM | DIASTOLIC BLOOD PRESSURE: 75 MMHG

## 2020-11-20 PROCEDURE — 6360000002 HC RX W HCPCS: Performed by: NURSE ANESTHETIST, CERTIFIED REGISTERED

## 2020-11-20 PROCEDURE — 88307 TISSUE EXAM BY PATHOLOGIST: CPT

## 2020-11-20 PROCEDURE — 7100000031 HC ASPR PHASE II RECOVERY - ADDTL 15 MIN: Performed by: PODIATRIST

## 2020-11-20 PROCEDURE — 2500000003 HC RX 250 WO HCPCS: Performed by: NURSE ANESTHETIST, CERTIFIED REGISTERED

## 2020-11-20 PROCEDURE — 3700000000 HC ANESTHESIA ATTENDED CARE: Performed by: PODIATRIST

## 2020-11-20 PROCEDURE — 3600000002 HC SURGERY LEVEL 2 BASE: Performed by: PODIATRIST

## 2020-11-20 PROCEDURE — 3600000012 HC SURGERY LEVEL 2 ADDTL 15MIN: Performed by: PODIATRIST

## 2020-11-20 PROCEDURE — 2580000003 HC RX 258: Performed by: ANESTHESIOLOGY

## 2020-11-20 PROCEDURE — 7100000000 HC PACU RECOVERY - FIRST 15 MIN: Performed by: PODIATRIST

## 2020-11-20 PROCEDURE — 7100000030 HC ASPR PHASE II RECOVERY - FIRST 15 MIN: Performed by: PODIATRIST

## 2020-11-20 PROCEDURE — 28285 REPAIR OF HAMMERTOE: CPT | Performed by: PODIATRIST

## 2020-11-20 PROCEDURE — 2500000003 HC RX 250 WO HCPCS: Performed by: PODIATRIST

## 2020-11-20 PROCEDURE — 28153 PARTIAL REMOVAL OF TOE: CPT | Performed by: PODIATRIST

## 2020-11-20 PROCEDURE — 2709999900 HC NON-CHARGEABLE SUPPLY: Performed by: PODIATRIST

## 2020-11-20 PROCEDURE — 20240 BONE BIOPSY OPEN SUPERFICIAL: CPT | Performed by: PODIATRIST

## 2020-11-20 PROCEDURE — 7100000001 HC PACU RECOVERY - ADDTL 15 MIN: Performed by: PODIATRIST

## 2020-11-20 PROCEDURE — 3700000001 HC ADD 15 MINUTES (ANESTHESIA): Performed by: PODIATRIST

## 2020-11-20 PROCEDURE — 88311 DECALCIFY TISSUE: CPT

## 2020-11-20 RX ORDER — FENTANYL CITRATE 50 UG/ML
25 INJECTION, SOLUTION INTRAMUSCULAR; INTRAVENOUS EVERY 5 MIN PRN
Status: DISCONTINUED | OUTPATIENT
Start: 2020-11-20 | End: 2020-11-20 | Stop reason: HOSPADM

## 2020-11-20 RX ORDER — PROPOFOL 10 MG/ML
INJECTION, EMULSION INTRAVENOUS PRN
Status: DISCONTINUED | OUTPATIENT
Start: 2020-11-20 | End: 2020-11-20 | Stop reason: SDUPTHER

## 2020-11-20 RX ORDER — FENTANYL CITRATE 50 UG/ML
50 INJECTION, SOLUTION INTRAMUSCULAR; INTRAVENOUS EVERY 5 MIN PRN
Status: DISCONTINUED | OUTPATIENT
Start: 2020-11-20 | End: 2020-11-20 | Stop reason: HOSPADM

## 2020-11-20 RX ORDER — SODIUM CHLORIDE 0.9 % (FLUSH) 0.9 %
10 SYRINGE (ML) INJECTION EVERY 12 HOURS SCHEDULED
Status: DISCONTINUED | OUTPATIENT
Start: 2020-11-20 | End: 2020-11-20 | Stop reason: HOSPADM

## 2020-11-20 RX ORDER — MIDAZOLAM HYDROCHLORIDE 1 MG/ML
INJECTION INTRAMUSCULAR; INTRAVENOUS PRN
Status: DISCONTINUED | OUTPATIENT
Start: 2020-11-20 | End: 2020-11-20 | Stop reason: SDUPTHER

## 2020-11-20 RX ORDER — SODIUM CHLORIDE 0.9 % (FLUSH) 0.9 %
10 SYRINGE (ML) INJECTION PRN
Status: DISCONTINUED | OUTPATIENT
Start: 2020-11-20 | End: 2020-11-20 | Stop reason: HOSPADM

## 2020-11-20 RX ORDER — LABETALOL HYDROCHLORIDE 5 MG/ML
5 INJECTION, SOLUTION INTRAVENOUS EVERY 10 MIN PRN
Status: DISCONTINUED | OUTPATIENT
Start: 2020-11-20 | End: 2020-11-20 | Stop reason: HOSPADM

## 2020-11-20 RX ORDER — BUPIVACAINE HYDROCHLORIDE 5 MG/ML
INJECTION, SOLUTION EPIDURAL; INTRACAUDAL PRN
Status: DISCONTINUED | OUTPATIENT
Start: 2020-11-20 | End: 2020-11-20 | Stop reason: ALTCHOICE

## 2020-11-20 RX ORDER — SODIUM CHLORIDE 9 MG/ML
INJECTION, SOLUTION INTRAVENOUS CONTINUOUS
Status: DISCONTINUED | OUTPATIENT
Start: 2020-11-20 | End: 2020-11-20 | Stop reason: HOSPADM

## 2020-11-20 RX ORDER — METOCLOPRAMIDE HYDROCHLORIDE 5 MG/ML
10 INJECTION INTRAMUSCULAR; INTRAVENOUS
Status: DISCONTINUED | OUTPATIENT
Start: 2020-11-20 | End: 2020-11-20 | Stop reason: HOSPADM

## 2020-11-20 RX ORDER — LIDOCAINE HYDROCHLORIDE 10 MG/ML
INJECTION, SOLUTION INFILTRATION; PERINEURAL PRN
Status: DISCONTINUED | OUTPATIENT
Start: 2020-11-20 | End: 2020-11-20 | Stop reason: ALTCHOICE

## 2020-11-20 RX ORDER — PROPOFOL 10 MG/ML
INJECTION, EMULSION INTRAVENOUS CONTINUOUS PRN
Status: DISCONTINUED | OUTPATIENT
Start: 2020-11-20 | End: 2020-11-20 | Stop reason: SDUPTHER

## 2020-11-20 RX ORDER — CLINDAMYCIN PHOSPHATE 600 MG/50ML
600 INJECTION INTRAVENOUS ONCE
Status: DISCONTINUED | OUTPATIENT
Start: 2020-11-20 | End: 2020-11-20 | Stop reason: HOSPADM

## 2020-11-20 RX ORDER — HYDROCODONE BITARTRATE AND ACETAMINOPHEN 5; 325 MG/1; MG/1
1 TABLET ORAL EVERY 6 HOURS PRN
Qty: 20 TABLET | Refills: 0 | Status: SHIPPED | OUTPATIENT
Start: 2020-11-20 | End: 2020-11-25

## 2020-11-20 RX ORDER — LIDOCAINE HYDROCHLORIDE 10 MG/ML
INJECTION, SOLUTION EPIDURAL; INFILTRATION; INTRACAUDAL; PERINEURAL PRN
Status: DISCONTINUED | OUTPATIENT
Start: 2020-11-20 | End: 2020-11-20 | Stop reason: SDUPTHER

## 2020-11-20 RX ORDER — HYDRALAZINE HYDROCHLORIDE 20 MG/ML
5 INJECTION INTRAMUSCULAR; INTRAVENOUS EVERY 10 MIN PRN
Status: DISCONTINUED | OUTPATIENT
Start: 2020-11-20 | End: 2020-11-20 | Stop reason: HOSPADM

## 2020-11-20 RX ORDER — LIDOCAINE HYDROCHLORIDE 10 MG/ML
1 INJECTION, SOLUTION EPIDURAL; INFILTRATION; INTRACAUDAL; PERINEURAL
Status: DISCONTINUED | OUTPATIENT
Start: 2020-11-20 | End: 2020-11-20 | Stop reason: HOSPADM

## 2020-11-20 RX ORDER — ONDANSETRON 2 MG/ML
4 INJECTION INTRAMUSCULAR; INTRAVENOUS
Status: DISCONTINUED | OUTPATIENT
Start: 2020-11-20 | End: 2020-11-20 | Stop reason: HOSPADM

## 2020-11-20 RX ADMIN — LIDOCAINE HYDROCHLORIDE 50 MG: 10 INJECTION, SOLUTION EPIDURAL; INFILTRATION; INTRACAUDAL; PERINEURAL at 12:09

## 2020-11-20 RX ADMIN — SODIUM CHLORIDE: 9 INJECTION, SOLUTION INTRAVENOUS at 11:02

## 2020-11-20 RX ADMIN — MIDAZOLAM 2 MG: 1 INJECTION INTRAMUSCULAR; INTRAVENOUS at 12:06

## 2020-11-20 RX ADMIN — PROPOFOL 100 MCG/KG/MIN: 10 INJECTION, EMULSION INTRAVENOUS at 12:09

## 2020-11-20 RX ADMIN — PROPOFOL 50 MG: 10 INJECTION, EMULSION INTRAVENOUS at 12:13

## 2020-11-20 ASSESSMENT — PULMONARY FUNCTION TESTS
PIF_VALUE: 1
PIF_VALUE: 0
PIF_VALUE: 1
PIF_VALUE: 0
PIF_VALUE: 1
PIF_VALUE: 0
PIF_VALUE: 1
PIF_VALUE: 2
PIF_VALUE: 1

## 2020-11-20 ASSESSMENT — ENCOUNTER SYMPTOMS: STRIDOR: 0

## 2020-11-20 ASSESSMENT — PAIN SCALES - GENERAL
PAINLEVEL_OUTOF10: 0
PAINLEVEL_OUTOF10: 0

## 2020-11-20 ASSESSMENT — PAIN - FUNCTIONAL ASSESSMENT: PAIN_FUNCTIONAL_ASSESSMENT: 0-10

## 2020-11-20 ASSESSMENT — PAIN DESCRIPTION - DESCRIPTORS: DESCRIPTORS: ACHING

## 2020-11-20 NOTE — OP NOTE
BRIEF OP NOTE    PATIENT NAME: Eunice Hanna  YOB: 1955  -  77 y.o. female  MRN: [id number]  DATE: 11/20/2020  BILLING #: [id number]    Surgeon(s):  Solomon Cervantes DPM     ASSISTANTS: Eddie Thakkar DPM PGY2    PRE-OP DIAGNOSIS:   1. Hammertoe deformity, second digit, left foot  2. Hammertoe deformity, third digit, left foot  3. Digital mucous cyst of third digit of left foot, bone cyst left third toe    POST-OP DIAGNOSIS: Same as above. PROCEDURE:   1. Exostectomy, medial aspect of head of proximal phalanx condyle, second digit, left foot   2. DIPJ arthroplasty, third digit, left foot correction hammertoe  3. Bone biopsy, deep excision left third middle phalanx    ANESTHESIA: MAC    HEMOSTASIS: Pneumatic ankle tourniquet @250 mmHg for 33 minutes. ESTIMATED BLOOD LOSS: Less than 5 cc. MATERIALS: 4-0 Vicryl, 4-0 Prolene  * No implants in log *    INJECTABLES: 10 cc of 1:1 mix of 0.5% marcaine plain and 1% lidocaine plain preoperatively      SPECIMEN:   ID Type Source Tests Collected by Time Destination   A : BONE BIOPSY OF 3RD DIGIT ON LEFT FOOT Bone Toe SURGICAL PATHOLOGY Solomon Cervantes DPM 11/20/2020 9106        COMPLICATIONS: None    FINDINGS: Soft, poor quality bone noted at the level of the distal interphalangeal joint of the third digit, left foot. Exostosis noted to the medial aspect of the head of the proximal phalanx, second digit, left foot. The patient was counseled at length about the risks of elizabeth Covid-19 during their perioperative period and any recovery window from their procedure. The patient was made aware that elizabeth Covid-19  may worsen their prognosis for recovering from their procedure  and lend to a higher morbidity and/or mortality risk. All material risks, benefits, and reasonable alternatives including postponing the procedure were discussed. The patient does wish to proceed with the procedure at this time.     Eddie Thakkar DPM   Podiatric Medicine & Surgery   11/20/2020 at 1:10 PM

## 2020-11-20 NOTE — ANESTHESIA POSTPROCEDURE EVALUATION
POST- ANESTHESIA EVALUATION       Pt Name: Tito Naqvi  MRN: [id number]  YOB: 1955  Date of evaluation: 11/20/2020  Time:  1:25 PM      /69   Pulse 79   Temp 97.5 °F (36.4 °C) (Infrared)   Resp 20   Ht 5' (1.524 m)   Wt 140 lb (63.5 kg)   LMP  (LMP Unknown)   SpO2 95%   BMI 27.34 kg/m²      Consciousness Level  Awake  Cardiopulmonary Status  Stable  Pain Adequately Treated YES  Nausea / Vomiting  NO  Adequate Hydration  YES  Anesthesia Related Complications NONE      Electronically signed by Agustina Fragoso MD on 11/20/2020 at 1:25 PM       Department of Anesthesiology  Postprocedure Note    Patient: Tito Naqvi  MRN: [id number]  YOB: 1955  Date of evaluation: 11/20/2020  Time:  1:25 PM     Procedure Summary     Date:  11/20/20 Room / Location:  [address] [place] [address] / [facility]: [facility]    Anesthesia Start:  8334 Anesthesia Stop:  4284    Procedure:  TOE HAMMER REPAIR 2ND AND 3RD, DIPJ W/BONE BIOPSY OF 3RD (Left Toes) Diagnosis:  (PAIN/HAMMERTOE 3RD LEFT TOE)    Surgeon:  Virgilio Rockwell DPM Responsible Provider:  Agustina Fragoso MD    Anesthesia Type:  MAC ASA Status:  2          Anesthesia Type: MAC    Soto Phase I: Soto Score: 8    Soto Phase II:      Last vitals: Reviewed and per EMR flowsheets.        Anesthesia Post Evaluation

## 2020-11-20 NOTE — OP NOTE
OP NOTE     PATIENT NAME: Raul Moise  YOB: 1955  -  77 y.o. female  MRN: [id number]  DATE: 11/20/2020  BILLING #: [id number]     Surgeon(s):  Roc Quinones DPM      ASSISTANTS: Rene Haider DPM PGY2     PRE-OP DIAGNOSIS:   1. Hammertoe deformity, second digit, left foot  2. Hammertoe deformity, third digit, left foot  3. Digital mucous cyst of third digit of left foot, bone cyst left third toe     POST-OP DIAGNOSIS: Same as above.     PROCEDURE:   1. Exostectomy, medial aspect of head of proximal phalanx condyle, second digit, left foot   2. DIPJ arthroplasty, third digit, left foot correction hammertoe  3. Bone biopsy, deep excision left third middle phalanx     ANESTHESIA: MAC     HEMOSTASIS: Pneumatic ankle tourniquet @250 mmHg for 33 minutes.     ESTIMATED BLOOD LOSS: Less than 5 cc.     MATERIALS: 4-0 Vicryl, 4-0 Prolene  * No implants in log *     INJECTABLES: 10 cc of 1:1 mix of 0.5% marcaine plain and 1% lidocaine plain preoperatively        SPECIMEN:   ID Type Source Tests Collected by Time Destination   A : BONE BIOPSY OF 3RD DIGIT ON LEFT FOOT Bone Toe SURGICAL PATHOLOGY Roc Quinones DPM 11/20/2020 9751           COMPLICATIONS: None     FINDINGS: Soft, poor quality bone noted at the level of the distal interphalangeal joint of the third digit, left foot. Exostosis noted to the medial aspect of the head of the proximal phalanx, second digit, left foot. INDICATION FOR PROCEDURE: Patient has had pain to the second and third digits of the left foot for quite some time. Patient has failed conservative treatment and is elected to undergo surgery at this time. All risks and benefits were discussed with the patient in detail, no guarantees were given or implied. Consent obtained and placed in the chart.     PROCEDURE IN DETAIL: The patient was transported from pre-op to the operating room and placed on the operating table in the supine position with a safety strap across the lap. A pneumatic ankle tourniquet was applied. After adequate sedation by the Anesthesia, a local block of 10cc of a 1:1 mixture of 1% lidocaine plain and 0.5% marcaine plain was injected. The foot was then prepped and draped in the usual aseptic fashion. The foot was then exsanguinated with an Esmarch bandage. The pneumatic ankle tourniquet was inflated to 250 mmHg. Attention was then directed to the left second digit where a linear incision was made over the PIPJ using a #15 blade. Incision was deepened, soft tissue dissected away from the head of the proximal phalanx, revealing prominent exostosis on the medial head of the proximal phalanx. Using a sagittal saw, prominence was resected and smoothed with a power rasp. Area was then irrigated with copious amounts of sterile saline and closed in layers using 4-0 Vicryl and 4-0 Prolene. Next, attention was directed to the left third digit where an elliptical incision was made over the DIPJ using a #15 blade. Incision was deepened until extensor tendon and DIPJ were identified. Extensor tendon was transected and reflected to reveal head of the middle phalanx and base of the distal phalanx. Soft, poor quality bone was noted at the level of the DIPJ. This was resected using a sagittal saw and passed to the back table for pathology. Area was then irrigated with sterile saline and closed in layers using 4-0 Vicryl and 4-0 Prolene. Dressings consisted of adaptic, 4 x 4s, Kerlix and an Ace bandage. The pneumatic ankle tourniquet was released and immediate hyperemic flush was noted to all five digits of the left foot. A surgical boot was then applied postoperatively. The patient tolerated the above procedure and anesthesia well without complications. The patient was transported from the operating room to the PACU with vital signs stable and vascular status intact to the left foot. The patient is to follow up with Dr. Tate James in his office as directed.      Electronically signed by Ole Rucker DPM on 11/20/2020 at 2:25 PM

## 2020-11-20 NOTE — INTERVAL H&P NOTE
Update History & Physical    The patient's History and Physical of November 6, 2020 was reviewed with the patient and I examined the patient. There was no change. Here today for left hammer 3rd toe repair DIPJ with bone biopsy. NPO. Took levothyroxine this am with sip of water. Pt reports she was taken off her lasix due to her elevated creatinine on pre admission testing labs. Denies taking any blood thinning medications. Pt reports cough daily for the last few weeks. She has seen her PCP for this concern and was prescribed a cough suppressant. Denies chest pain/pressure, palpitations, SOB, recent URI, N/V/D or constipation, fever or chills. Review vitals per RN flowsheet.      Electronically signed by LULÚ Siddiqui CNP on 11/20/2020 at 10:15 AM

## 2020-11-20 NOTE — ANESTHESIA PRE PROCEDURE
Department of Anesthesiology  Preprocedure Note       Name:  Zhang Emmanuel   Age:  77 y.o.  :  1955                                          MRN:  [id number]         Date:  2020      Surgeon: Selina Mccallum):  Sanaz Yao DPM    Procedure: Procedure(s):  TOE HAMMER REPAIR 3RD, DIPJ W/BONE BIOPSY    Medications prior to admission:   Prior to Admission medications    Medication Sig Start Date End Date Taking? Authorizing Provider   benzonatate (TESSALON) 100 MG capsule Take 100 mg by mouth 3 times daily as needed for Cough   Yes Historical Provider, MD   cetirizine (ZYRTEC) 10 MG tablet Take 10 mg by mouth daily   Yes Historical Provider, MD   fluticasone (FLOVENT HFA) 110 MCG/ACT inhaler Inhale 1 puff into the lungs 2 times daily   Yes Historical Provider, MD   gabapentin (NEURONTIN) 300 MG capsule Take 100 mg by mouth daily. 20  Yes Historical Provider, MD   diclofenac sodium (VOLTAREN) 1 % GEL Apply 4 g topically 4 times daily 20 Yes Nneka Wallace DPM   fluocinonide (LIDEX) 0.05 % cream Apply topically 2 times daily.  20  Yes Snaaz Yao DPM   albuterol sulfate  (90 Base) MCG/ACT inhaler Inhale 2 puffs into the lungs 7/15/19  Yes Historical Provider, MD   benztropine (COGENTIN) 1 MG tablet Take 1.5 mg by mouth daily    Yes Historical Provider, MD   calcium carbonate (OYSTER SHELL CALCIUM 500 MG) 1250 (500 Ca) MG tablet Take 1,250 mg by mouth   Yes Historical Provider, MD   lamoTRIgine (LAMICTAL) 25 MG tablet lamotrigine 25 mg tablet   Yes Historical Provider, MD   Multiple Vitamins-Minerals (EYE VITAMINS & MINERALS) TABS Eye Vitamin and Minerals   Yes Historical Provider, MD   fenofibrate (TRICOR) 145 MG tablet TAKE 1 TABLET BY MOUTH ONE TIME A DAY WITH a MEAL 19  Yes Historical Provider, MD   hydrOXYzine (ATARAX) 10 MG tablet hydroxyzine HCl 10 mg tablet 18  Yes Historical Provider, MD Edward Bath Oil 500 MG CAPS Take 1 capsule by mouth   Yes Historical Provider, MD   nystatin (MYCOSTATIN) 322734 UNIT/GM powder Apply topically 1/2/19  Yes Historical Provider, MD   omega-3 acid ethyl esters (LOVAZA) 1 g capsule Take 2 g by mouth   Yes Historical Provider, MD   POLYETHYLENE GLYCOL 3350 PO Take 17 g by mouth   Yes Historical Provider, MD   potassium chloride (KLOR-CON M) 10 MEQ extended release tablet Take 10 mEq by mouth 2 times daily  5/10/19  Yes Historical Provider, MD   Skin Protectants, Misc. (EUCERIN) cream Apply topically 7/31/19  Yes Historical Provider, MD   glucosamine-chondroitin 500-400 MG tablet Take 1 tablet by mouth   Yes Historical Provider, MD   Levothyroxine Sodium 75 MCG CAPS Take 75 mcg by mouth Daily  3/16/16  Yes Historical Provider, MD   cyclobenzaprine (FLEXERIL) 10 MG tablet  10/1/15  Yes Historical Provider, MD   vilazodone HCl (VILAZODONE HCL) 40 MG TABS Take 40 mg by mouth daily   Yes Historical Provider, MD   L-Lysine 500 MG CAPS Take  by mouth. Yes Historical Provider, MD   vitamin E 400 UNIT capsule Take 400 Units by mouth daily. Yes Historical Provider, MD   Multiple Vitamins-Minerals (THERAPEUTIC MULTIVITAMIN-MINERALS) tablet Take 1 tablet by mouth daily. Yes Historical Provider, MD   Dextromethorphan-Guaifenesin (MUCINEX DM MAXIMUM STRENGTH)  MG TB12 Take  by mouth. Yes Historical Provider, MD   HYDROcodone-acetaminophen (NORCO) 5-325 MG per tablet Take 1 tablet by mouth daily as needed. 7/20/13  Yes Historical Provider, MD   naproxen (NAPROSYN) 500 MG tablet Take 500 mg by mouth daily as needed. 7/13/13  Yes Historical Provider, MD   SUMAtriptan (IMITREX) 100 MG tablet Take 100 mg by mouth once as needed. Yes Historical Provider, MD   zolpidem (AMBIEN) 10 MG tablet Take 10 mg by mouth nightly as needed. Yes Historical Provider, MD   atorvastatin (LIPITOR) 40 MG tablet Take 40 mg by mouth daily. Yes Historical Provider, MD   montelukast (SINGULAIR) 10 MG tablet Take 10 mg by mouth nightly.    Yes Arthritis     Asthma     Benign hypertension with CKD (chronic kidney disease) stage III     CKD (chronic kidney disease) stage 3, GFR 30-59 ml/min     GERD (gastroesophageal reflux disease)     Hyperlipidemia     Hypothyroidism     MVP (mitral valve prolapse)     Rectal bleeding     Scarlet fever     Sjogren - Ragini's syndrome        Past Surgical History:        Procedure Laterality Date    APPENDECTOMY      BLADDER SURGERY      Bladder and rectum reconstruction    BREAST SURGERY Left     biopsy    COLONOSCOPY  01/22/16     RECALL 10 YRS , RECTAL SCAR BIOPSY COLONIC MUCOSA WITH FOCAL ULCER AND GRANULATION TISSUE     DENTAL SURGERY      complete teeth extraction    ELBOW SURGERY      EYE SURGERY      bilateral cataract    HYSTERECTOMY      KNEE SURGERY      left    KNEE SURGERY      right    NECK SURGERY      OTHER SURGICAL HISTORY      lower back    RECTAL PROLAPSE REPAIR      SHOULDER SURGERY      SPINE SURGERY      TONSILLECTOMY      TUBAL LIGATION      bilateral    UPPER GASTROINTESTINAL ENDOSCOPY  2007    funal gastric polyp        Social History:    Social History     Tobacco Use    Smoking status: Never Smoker    Smokeless tobacco: Never Used   Substance Use Topics    Alcohol use:  No                                Counseling given: Not Answered      Vital Signs (Current):   Vitals:    11/20/20 1016 11/20/20 1030   BP:  (!) 144/72   Pulse:  91   Resp:  18   Temp:  99.1 °F (37.3 °C)   TempSrc:  Infrared   SpO2:  100%   Weight: 140 lb (63.5 kg)    Height: 5' (1.524 m)                                               BP Readings from Last 3 Encounters:   11/20/20 (!) 144/72   11/06/20 (!) 145/65   08/13/19 130/84       NPO Status: Time of last liquid consumption: 2200                        Time of last solid consumption: 2130                        Date of last liquid consumption: 11/19/20                        Date of last solid food consumption: 11/19/20    BMI:   Wt Readings from Last 3 Encounters:   11/20/20 140 lb (63.5 kg)   11/06/20 140 lb (63.5 kg)   10/20/20 142 lb (64.4 kg)     Body mass index is 27.34 kg/m². CBC:   Lab Results   Component Value Date    WBC 5.3 11/06/2020    RBC 3.91 11/06/2020    HGB 11.3 11/06/2020    HCT 33.8 11/06/2020    MCV 86.4 11/06/2020    RDW 15.3 11/06/2020     11/06/2020       CMP:   Lab Results   Component Value Date     11/06/2020    K 4.2 11/06/2020     11/06/2020    CO2 26 11/06/2020    BUN 19 11/06/2020    CREATININE 1.23 11/06/2020    GFRAA 53 11/06/2020    LABGLOM 44 11/06/2020    GLUCOSE 90 11/06/2020    CALCIUM 9.2 11/06/2020       POC Tests: No results for input(s): POCGLU, POCNA, POCK, POCCL, POCBUN, POCHEMO, POCHCT in the last 72 hours.     Coags: No results found for: PROTIME, INR, APTT    HCG (If Applicable): No results found for: PREGTESTUR, PREGSERUM, HCG, HCGQUANT     ABGs: No results found for: PHART, PO2ART, AOH9AYK, AFK2YEO, BEART, Q8WBZGQP     Type & Screen (If Applicable):  No results found for: LABABO, LABRH    Drug/Infectious Status (If Applicable):  No results found for: HIV, HEPCAB    COVID-19 Screening (If Applicable):   Lab Results   Component Value Date    COVID19 Not Detected 11/16/2020         Anesthesia Evaluation  Patient summary reviewed and Nursing notes reviewed  Airway: Mallampati: I  TM distance: >3 FB   Neck ROM: full  Mouth opening: > = 3 FB Dental:    (+) edentulous      Pulmonary: breath sounds clear to auscultation  (+) asthma:     (-) rhonchi, wheezes, rales and stridor                           Cardiovascular:    (+) hypertension: no interval change, valvular problems/murmurs: MVP,     (-) murmur, weak pulses,  friction rub, systolic click, carotid bruit,  JVD and peripheral edema    ECG reviewed  Rhythm: regular  Rate: normal                    Neuro/Psych:               GI/Hepatic/Renal:   (+) GERD: no interval change, renal disease: CRI,           Endo/Other:    (+) hypothyroidism::., .                 Abdominal:           Vascular:                                        Anesthesia Plan      MAC     ASA 2       Induction: intravenous. MIPS: Postoperative opioids intended and Prophylactic antiemetics administered. Anesthetic plan and risks discussed with patient. Plan discussed with CRNA.                   Ramirez Avalos MD   11/20/2020

## 2020-11-23 ENCOUNTER — OFFICE VISIT (OUTPATIENT)
Dept: PODIATRY | Age: 65
End: 2020-11-23

## 2020-11-23 VITALS — BODY MASS INDEX: 27.48 KG/M2 | HEIGHT: 60 IN | WEIGHT: 140 LBS

## 2020-11-23 PROCEDURE — 99024 POSTOP FOLLOW-UP VISIT: CPT | Performed by: PODIATRIST

## 2020-11-23 NOTE — PROGRESS NOTES
[address] [facility] [address]  Post Op note  Chief Complaint   Patient presents with    Post-Op Check     post op initial left foot        Rochelle Mello is a 77y.o. year old female who is 3 day(s) post op from foot surgery. Type: condylectomy right 2nd toe, arthroplasty third DIPJ with biopsy left. Vital signs are stable. Pain level is 1. Patient denies N/V/F/C. Patient has been compliant with postoperative instructions. Review of Systems    Vascular: DP and PT pulses palpable 2/4, Right Foot and 2/4 on the Left Foot. CFT <3 seconds, Right Foot and <3 seconds on the Left Foot. Edema is absent,  Right Foot and presenton the Left Foot. Neurological:   Sensation present  to light touch to level of digits, both feet. Musculoskeletal:   Muscle strength is 5/5 on the Right Foot and 5/5 on the Left Foot. Structural deformities are absent on the Right Foot and absent on the Left Foot. Integument:  Warm, dry, supple both feet. Incisions are healing well. Wound dehiscence is absent. Infection is absent. Radiographs: 3 views left Foot:  Arthroplasty of the third distal interphalangeal joint. Removal of the medial condyle of the head of the second proximal phalanx. Assesment : Post operative progress gradually improving. Diagnosis Orders   1. Digital mucinous cyst of toe of left foot  XR FOOT LEFT (MIN 3 VIEWS)   2. Osteoarthritis of toe joint, left  XR FOOT LEFT (MIN 3 VIEWS)   3. Post-operative state  XR FOOT LEFT (MIN 3 VIEWS)         Plan: Sutures in place. Dry sterile dressing applied. Keep surgical site dry. Ice and elevation as directed. Darco shoe    No orders of the defined types were placed in this encounter. Follow up 2week(s).

## 2020-11-24 LAB — SURGICAL PATHOLOGY REPORT: NORMAL

## 2020-12-07 ENCOUNTER — OFFICE VISIT (OUTPATIENT)
Dept: PODIATRY | Age: 65
End: 2020-12-07

## 2020-12-07 VITALS — WEIGHT: 140 LBS | BODY MASS INDEX: 27.48 KG/M2 | HEIGHT: 60 IN

## 2020-12-07 PROCEDURE — 99024 POSTOP FOLLOW-UP VISIT: CPT | Performed by: PODIATRIST

## 2021-01-07 ENCOUNTER — TELEPHONE (OUTPATIENT)
Dept: UROLOGY | Age: 66
End: 2021-01-07

## 2021-01-29 ENCOUNTER — TELEPHONE (OUTPATIENT)
Dept: PODIATRY | Age: 66
End: 2021-01-29

## 2021-02-03 ENCOUNTER — TELEPHONE (OUTPATIENT)
Dept: UROLOGY | Age: 66
End: 2021-02-03

## 2021-02-18 ENCOUNTER — OFFICE VISIT (OUTPATIENT)
Dept: PODIATRY | Age: 66
End: 2021-02-18

## 2021-02-18 VITALS — HEIGHT: 60 IN | BODY MASS INDEX: 26.31 KG/M2 | WEIGHT: 134 LBS

## 2021-02-18 DIAGNOSIS — M79.675 PAIN IN LEFT TOE(S): ICD-10-CM

## 2021-02-18 DIAGNOSIS — M67.472 DIGITAL MUCINOUS CYST OF TOE OF LEFT FOOT: ICD-10-CM

## 2021-02-18 DIAGNOSIS — M19.072 OSTEOARTHRITIS OF TOE JOINT, LEFT: ICD-10-CM

## 2021-02-18 DIAGNOSIS — M79.672 LEFT FOOT PAIN: Primary | ICD-10-CM

## 2021-02-18 DIAGNOSIS — R60.0 EDEMA OF TOE: ICD-10-CM

## 2021-02-18 PROCEDURE — 99024 POSTOP FOLLOW-UP VISIT: CPT | Performed by: PODIATRIST

## 2021-03-02 ENCOUNTER — OFFICE VISIT (OUTPATIENT)
Dept: UROLOGY | Age: 66
End: 2021-03-02
Payer: MEDICARE

## 2021-03-02 VITALS
TEMPERATURE: 98.1 F | HEIGHT: 60 IN | BODY MASS INDEX: 26.31 KG/M2 | DIASTOLIC BLOOD PRESSURE: 97 MMHG | SYSTOLIC BLOOD PRESSURE: 150 MMHG | WEIGHT: 134 LBS | HEART RATE: 95 BPM

## 2021-03-02 DIAGNOSIS — N39.0 RECURRENT UTI: Primary | ICD-10-CM

## 2021-03-02 PROCEDURE — 99204 OFFICE O/P NEW MOD 45 MIN: CPT | Performed by: UROLOGY

## 2021-03-02 RX ORDER — SULFAMETHOXAZOLE AND TRIMETHOPRIM 800; 160 MG/1; MG/1
1 TABLET ORAL 2 TIMES DAILY
COMMUNITY
Start: 2021-02-28 | End: 2021-03-07

## 2021-03-02 ASSESSMENT — ENCOUNTER SYMPTOMS
CONSTIPATION: 0
VOMITING: 0
BACK PAIN: 1
NAUSEA: 1
SHORTNESS OF BREATH: 0
EYE PAIN: 0
WHEEZING: 1
ABDOMINAL PAIN: 0
COUGH: 1
EYE REDNESS: 0
DIARRHEA: 0

## 2021-03-02 NOTE — LETTER
[address] [address]  Dept: [phone]  Dept Fax: [phone]        3/2/21    Patient: Carmen Tapia  YOB: 1955    Dear Cehla Sarmiento MD,    I had the pleasure of seeing one of your patients, Venecia Arredondo today in the office today. Below are the relevant portions of my assessment and plan of care. IMPRESSION:  1. Recurrent UTI        PLAN:  She has been having recurrent UTIs. Will obtain renal and bladder ultrasound. Will need cysto as well. Return in about 3 weeks (around 3/23/2021) for Cystoscopy. Prescriptions Ordered:  No orders of the defined types were placed in this encounter. Orders Placed:  Orders Placed This Encounter   Procedures    US RENAL COMPLETE     Standing Status:   Future     Standing Expiration Date:   3/3/2022         Thank you for allowing me to participate in the care of this patient. I will keep you updated on this patient's follow up and I look forward to serving you and your patients again in the future.         Hira Dalton MD

## 2021-03-02 NOTE — PROGRESS NOTES
[address] [address]  Dept: [address] [facility] Urology Office Note - Established    Patient:  Bisi Lamb  YOB: 1955  Date: 3/2/2021    The patient is a 77 y.o. female whopresents today for evaluation of the following problems:   Chief Complaint   Patient presents with    New Patient     UTI, multiple, urination issues, straining to go    Urinary Tract Infection     \" I keep getting UTI, had a rebuilt bladder years ago. I think I am in retention,I am wondering if its all caused by my rectal prolapse, I am delusionall, I see people. I am currently being treated for an UTI. I am taking bactrim. \"        HPI  She is here for recurrent UTIs. She had some bladder surgery many year ago. She has had 3 infections since December. She has been having some confusion. She has some problems emptying her bladder. No fevers that she is aware of. She had 2 E. Coli infections over the last few months. Summary of old records: N/A    Additional History: N/A    Procedures Today: N/A    Urinalysis today:  No results found for this visit on 03/02/21. Imaging Reviewed during this Office Visit: none  (results were independently reviewed by physician and radiology report verified)    AUA Symptom Score (3/2/2021):   INCOMPLETE EMPTYING: How often have you had the sensation of not emptying your bladder?: Almost always  FREQUENCY: How often do you have to urinate less than every two hours?: Almost always  INTERMITTENCY: How often have you found you stopped and started again several times when you urinated?: Not at all  URGENCY: How often have you found it difficult to postpone urination?: Less than 1 to 5 times  WEAK STREAM: How often have you had a weak urinary stream?: More than Half the time  STRAINING: How often have you had to strain to start  urination?: Almost always  NOCTURIA: How many times did you typically get up at night to uriniate?: NONE  TOTAL I-PSS SCORE[de-identified] 20  How would you feel if you were to spend the rest of your life with your urinary condition?: Terrible    Last BUN and creatinine:  Lab Results   Component Value Date    BUN 19 11/06/2020     Lab Results   Component Value Date    CREATININE 1.23 (H) 11/06/2020       Additional Lab/Culture results: urine cultures were E.Coli.    PAST MEDICAL, FAMILY AND SOCIAL HISTORY UPDATE:  Past Medical History:   Diagnosis Date    Abdominal pain     Arthritis     Asthma     Benign hypertension with CKD (chronic kidney disease) stage III     CKD (chronic kidney disease) stage 3, GFR 30-59 ml/min     GERD (gastroesophageal reflux disease)     Hyperlipidemia     Hypothyroidism     MVP (mitral valve prolapse)     Rectal bleeding     Scarlet fever     Sjogren - Ragini's syndrome      Past Surgical History:   Procedure Laterality Date    APPENDECTOMY      BLADDER SURGERY      Bladder and rectum reconstruction    BREAST SURGERY Left     biopsy    COLONOSCOPY  01/22/16     RECALL 10 YRS , RECTAL SCAR BIOPSY COLONIC MUCOSA WITH FOCAL ULCER AND GRANULATION TISSUE     DENTAL SURGERY      complete teeth extraction    ELBOW SURGERY      EYE SURGERY      bilateral cataract    HAMMER TOE SURGERY Left 11/20/2020    TOE HAMMER REPAIR 2ND AND 3RD, DIPJ W/BONE BIOPSY OF 3RD performed by Britton Segovia DPM at [address]      left    KNEE SURGERY      right    NECK SURGERY      OTHER SURGICAL HISTORY      lower back    RECTAL PROLAPSE REPAIR      SHOULDER SURGERY      SPINE SURGERY      TONSILLECTOMY      TUBAL LIGATION      bilateral    UPPER GASTROINTESTINAL ENDOSCOPY  2007    funal gastric polyp      Family History   Problem Relation Age of Onset    Heart Disease Mother     Stroke Father      Outpatient Medications Marked as Taking for the 3/2/21 encounter (Office Visit) with Marissa Mcduffie MD   Medication Sig Dispense Refill    sulfamethoxazole-trimethoprim (BACTRIM DS;SEPTRA DS) 800-160 MG per tablet Take 1 tablet by mouth 2 times daily      Aluminum & Magnesium Hydroxide (MAGNESIUM-ALUMINUM PO) Take by mouth      benzonatate (TESSALON) 100 MG capsule Take 100 mg by mouth 3 times daily as needed for Cough      cetirizine (ZYRTEC) 10 MG tablet Take 10 mg by mouth daily      fluticasone (FLOVENT HFA) 110 MCG/ACT inhaler Inhale 1 puff into the lungs 2 times daily      gabapentin (NEURONTIN) 300 MG capsule Take 100 mg by mouth daily.  fluocinonide (LIDEX) 0.05 % cream Apply topically 2 times daily. 30 g 0    albuterol sulfate  (90 Base) MCG/ACT inhaler Inhale 2 puffs into the lungs      benztropine (COGENTIN) 1 MG tablet Take 1.5 mg by mouth daily       calcium carbonate (OYSTER SHELL CALCIUM 500 MG) 1250 (500 Ca) MG tablet Take 1,250 mg by mouth      estradiol (ESTRACE) 0.1 MG/GM vaginal cream See Admin Instructions 2 times a wek      lamoTRIgine (LAMICTAL) 25 MG tablet lamotrigine 25 mg tablet      Multiple Vitamins-Minerals (EYE VITAMINS & MINERALS) TABS Eye Vitamin and Minerals      fenofibrate (TRICOR) 145 MG tablet TAKE 1 TABLET BY MOUTH ONE TIME A DAY WITH a MEAL  3    hydrOXYzine (ATARAX) 10 MG tablet hydroxyzine HCl 10 mg tablet      Krill Oil 500 MG CAPS Take 1 capsule by mouth      nystatin (MYCOSTATIN) 540900 UNIT/GM powder Apply topically      omega-3 acid ethyl esters (LOVAZA) 1 g capsule Take 2 g by mouth      POLYETHYLENE GLYCOL 3350 PO Take 17 g by mouth      potassium chloride (KLOR-CON M) 10 MEQ extended release tablet Take 10 mEq by mouth 2 times daily       Skin Protectants, Misc.  (EUCERIN) cream Apply topically      glucosamine-chondroitin 500-400 MG tablet Take 1 tablet by mouth      conjugated estrogens (PREMARIN) 0.625 MG/GM vaginal cream Place 0.5 g vaginally twice a week 1 Tube 6    Levothyroxine Sodium 75 MCG CAPS Take 75 mcg by mouth Daily       cyclobenzaprine (FLEXERIL) 10 MG tablet       vilazodone HCl (VILAZODONE HCL) 40 MG TABS Take 40 mg by mouth daily      L-Lysine 500 MG CAPS Take  by mouth.  vitamin E 400 UNIT capsule Take 400 Units by mouth daily.  Multiple Vitamins-Minerals (THERAPEUTIC MULTIVITAMIN-MINERALS) tablet Take 1 tablet by mouth daily.  Dextromethorphan-Guaifenesin (MUCINEX DM MAXIMUM STRENGTH)  MG TB12 Take  by mouth.  HYDROcodone-acetaminophen (NORCO) 5-325 MG per tablet Take 1 tablet by mouth daily as needed.  naproxen (NAPROSYN) 500 MG tablet Take 500 mg by mouth daily as needed.  SUMAtriptan (IMITREX) 100 MG tablet Take 100 mg by mouth once as needed.  zolpidem (AMBIEN) 10 MG tablet Take 10 mg by mouth nightly as needed.  atorvastatin (LIPITOR) 40 MG tablet Take 40 mg by mouth daily.  montelukast (SINGULAIR) 10 MG tablet Take 10 mg by mouth nightly. (All medications reviewed and updated by provider sincelast office visit or hospitalization)   Latex, Nickel, Benadryl [diphenhydramine], Cefadroxil, Cephalexin, Demerol hcl [meperidine], Erythromycin, Lansoprazole, Lomefloxacin, Loracarbef, Meperidine hcl, Other, Percocet [oxycodone-acetaminophen], Pneumococcal vaccines, Seroquel [quetiapine fumarate], and Adhesive tape  Social History     Tobacco Use   Smoking Status Never Smoker   Smokeless Tobacco Never Used      (If patient a smoker, smoking cessation counseling offered)     Social History     Substance and Sexual Activity   Alcohol Use No       REVIEW OF SYSTEMS:  Review of Systems      Physical Exam:      Vitals:    03/02/21 1120   BP: (!) 150/97   Pulse: 95   Temp: 98.1 °F (36.7 °C)     Body mass index is 26.17 kg/m². Patient is a 77 y.o. female in noacute distress and alert and oriented to person, place and time. Physical Exam  Constitutional: Patient in no acute distress. Neuro: Alert andoriented to person, place and time.   Psych: Mood normal, affect normal  Lungs: Respiratory effort is normal  Cardiovascular: Warm & Pink  Abdomen: Soft, non-tender, non-distended with no CVA,  No flank tenderness,  Or hepatosplenomegaly   Lymphatics: No palpable lymphadenopathy. Bladder non-tender and not distended. Musculoskeletal: Normalgait and station      and Plan      1. Recurrent UTI           Plan:         She has been having recurrent UTIs. Will obtain renal and bladder ultrasound. Will need cysto as well. Return in about 3 weeks (around 3/23/2021) for Cystoscopy. Prescriptions Ordered:  No orders of the defined types were placed in this encounter. Orders Placed:  Orders Placed This Encounter   Procedures    US RENAL COMPLETE     Standing Status:   Future     Standing Expiration Date:   3/3/2022            Azul Borges MD    Agree with the ROS entered by the MA.

## 2021-03-02 NOTE — PROGRESS NOTES
Review of Systems   Constitutional: Positive for appetite change and chills. Negative for fever. Eyes: Negative for pain, redness and visual disturbance. Respiratory: Positive for cough and wheezing. Negative for shortness of breath. Cardiovascular: Positive for leg swelling. Negative for chest pain. Gastrointestinal: Positive for nausea. Negative for abdominal pain, constipation, diarrhea and vomiting. Genitourinary: Positive for difficulty urinating. Negative for dysuria, flank pain, frequency, hematuria and urgency. Currently being treated for a UTI. Musculoskeletal: Positive for back pain, joint swelling and myalgias. Skin: Negative for rash and wound. Neurological: Positive for dizziness, tremors and numbness. Hematological: Does not bruise/bleed easily.

## 2021-03-09 ENCOUNTER — HOSPITAL ENCOUNTER (OUTPATIENT)
Age: 66
Setting detail: SPECIMEN
Discharge: HOME | End: 2021-03-09
Payer: MEDICARE

## 2021-03-09 ENCOUNTER — PROCEDURE VISIT (OUTPATIENT)
Dept: UROLOGY | Age: 66
End: 2021-03-09
Payer: MEDICARE

## 2021-03-09 VITALS — TEMPERATURE: 98.6 F

## 2021-03-09 DIAGNOSIS — R10.9 FLANK PAIN: ICD-10-CM

## 2021-03-09 DIAGNOSIS — N39.0 RECURRENT UTI: ICD-10-CM

## 2021-03-09 DIAGNOSIS — N39.0 RECURRENT UTI: Primary | ICD-10-CM

## 2021-03-09 PROCEDURE — 52000 CYSTOURETHROSCOPY: CPT | Performed by: UROLOGY

## 2021-03-10 LAB
CULTURE: NO GROWTH
Lab: NORMAL
SPECIMEN DESCRIPTION: NORMAL

## 2021-03-24 ENCOUNTER — TELEPHONE (OUTPATIENT)
Dept: UROLOGY | Age: 66
End: 2021-03-24

## 2021-03-24 NOTE — TELEPHONE ENCOUNTER
Roma from [employer] called stating that patient CT abd and pelvis is on the verge of being denied and needs a peer to peer.   Number to contact [phone]  Case # [id number]    Jamie Stack can be reached at [phone]

## 2021-04-01 ENCOUNTER — OFFICE VISIT (OUTPATIENT)
Dept: PODIATRY | Age: 66
End: 2021-04-01
Payer: MEDICARE

## 2021-04-01 VITALS — WEIGHT: 138 LBS | BODY MASS INDEX: 27.09 KG/M2 | HEIGHT: 60 IN

## 2021-04-01 DIAGNOSIS — M79.672 LEFT FOOT PAIN: ICD-10-CM

## 2021-04-01 DIAGNOSIS — M79.672 FOOT PAIN, LEFT: Primary | ICD-10-CM

## 2021-04-01 DIAGNOSIS — M19.072 OSTEOARTHRITIS OF TOE JOINT, LEFT: ICD-10-CM

## 2021-04-01 DIAGNOSIS — I87.2 CHRONIC VENOUS INSUFFICIENCY: ICD-10-CM

## 2021-04-01 DIAGNOSIS — M79.675 PAIN IN LEFT TOE(S): ICD-10-CM

## 2021-04-01 DIAGNOSIS — R60.0 EDEMA OF LEFT LOWER LEG: ICD-10-CM

## 2021-04-01 DIAGNOSIS — R60.0 EDEMA OF TOE: ICD-10-CM

## 2021-04-01 PROCEDURE — 99213 OFFICE O/P EST LOW 20 MIN: CPT | Performed by: PODIATRIST

## 2021-04-01 ASSESSMENT — ENCOUNTER SYMPTOMS
NAUSEA: 0
CONSTIPATION: 0
DIARRHEA: 0
COLOR CHANGE: 0
BACK PAIN: 1
VOMITING: 0

## 2021-04-01 NOTE — PROGRESS NOTES
left toes 2,3. Minimal edema dorsal left foot. Integument:  Warm, dry, supple both feet. Incisions are healing well. Wound dehiscence is absent. Infection is absent. Radiographs: 3 views left Foot:  Arthroplasty of the third distal interphalangeal joint. Removal of the medial condyle of the head of the second proximal phalanx. Assesment : Post operative progress    Diagnosis Orders   1. Foot pain, left  XR FOOT LEFT (MIN 3 VIEWS)    ANKUSH Mendenhall MD, Vascular Surgery, [place]   2. Chronic venous insufficiency  ANKUSH Mendenhall MD, Vascular Surgery, [place]   3. Osteoarthritis of toe joint, left     4. Pain in left toe(s)     5. Edema of left lower leg     6. Edema of toe     7. Left foot pain           Plan: Pt was evaluated and examined. Patient was given personalized discharge instructions. Pt will follow up in 4-6 weeks or sooner if any problems arise. Diagnosis was discussed with the pt and all of their questions were answered in detail. Proper foot hygiene and care was discussed with the pt. Patient to check feet daily and contact the office with any questions/problems/concerns. Other comorbidity noted and will be managed by PCP. Regular shoe, stiff sole, may need new shoes  May consider orthotics at the next visit  Compression to toes 2-3, with 1 inch coban as needed    Use topicals more regularly, topical voltaren gel. Referral to Dr. Black George. No orders of the defined types were placed in this encounter. Follow up 2week(s).

## 2021-06-01 ENCOUNTER — TELEPHONE (OUTPATIENT)
Dept: PODIATRY | Age: 66
End: 2021-06-01

## 2021-06-08 ENCOUNTER — OFFICE VISIT (OUTPATIENT)
Dept: UROLOGY | Age: 66
End: 2021-06-08
Payer: MEDICARE

## 2021-06-08 VITALS — HEART RATE: 99 BPM | TEMPERATURE: 97.1 F | SYSTOLIC BLOOD PRESSURE: 130 MMHG | DIASTOLIC BLOOD PRESSURE: 84 MMHG

## 2021-06-08 DIAGNOSIS — N39.0 RECURRENT UTI: Primary | ICD-10-CM

## 2021-06-08 PROCEDURE — 99213 OFFICE O/P EST LOW 20 MIN: CPT | Performed by: UROLOGY

## 2021-06-08 RX ORDER — NITROFURANTOIN MACROCRYSTALS 100 MG/1
100 CAPSULE ORAL DAILY
Qty: 30 CAPSULE | Refills: 3 | Status: SHIPPED | OUTPATIENT
Start: 2021-06-08 | End: 2021-07-08

## 2021-06-08 ASSESSMENT — ENCOUNTER SYMPTOMS
ABDOMINAL PAIN: 0
NAUSEA: 0
WHEEZING: 0
DIARRHEA: 0
BACK PAIN: 0
EYE PAIN: 0
EYE REDNESS: 0
CONSTIPATION: 0
VOMITING: 0
COUGH: 0
SHORTNESS OF BREATH: 0

## 2021-06-08 NOTE — LETTER
[address] [address]  Dept: [phone]  Dept Fax: [phone]        6/8/21    Patient: Martha Velasquez  YOB: 1955    Dear Fletcher Crigler, MD,    I had the pleasure of seeing one of your patients, Paulina Stevenosn today in the office today. Below are the relevant portions of my assessment and plan of care. IMPRESSION:  1. Recurrent UTI        PLAN:  She is doing well. No recent infections. Cysto and CT were negative. Will start daily macrodantin. Return in about 1 year (around 6/8/2022). Prescriptions Ordered:  Orders Placed This Encounter   Medications    nitrofurantoin (MACRODANTIN) 100 MG capsule     Sig: Take 1 capsule by mouth daily     Dispense:  30 capsule     Refill:  3      Orders Placed:  No orders of the defined types were placed in this encounter. Thank you for allowing me to participate in the care of this patient. I will keep you updated on this patient's follow up and I look forward to serving you and your patients again in the future.         Ayden Cuello MD

## 2021-06-08 NOTE — PROGRESS NOTES
Component Value Date    CREATININE 1.23 (H) 11/06/2020       Additional Lab/Culture results: none    PAST MEDICAL, FAMILY AND SOCIAL HISTORY UPDATE:  Past Medical History:   Diagnosis Date    Abdominal pain     Arthritis     Asthma     Benign hypertension with CKD (chronic kidney disease) stage III (HCC)     CKD (chronic kidney disease) stage 3, GFR 30-59 ml/min (HCC)     GERD (gastroesophageal reflux disease)     Hyperlipidemia     Hypothyroidism     MVP (mitral valve prolapse)     Rectal bleeding     Scarlet fever     Sjogren - Ragini's syndrome      Past Surgical History:   Procedure Laterality Date    APPENDECTOMY      BLADDER SURGERY      Bladder and rectum reconstruction    BREAST SURGERY Left     biopsy    COLONOSCOPY  01/22/16     RECALL 10 YRS , RECTAL SCAR BIOPSY COLONIC MUCOSA WITH FOCAL ULCER AND GRANULATION TISSUE     DENTAL SURGERY      complete teeth extraction    ELBOW SURGERY      EYE SURGERY      bilateral cataract    HAMMER TOE SURGERY Left 11/20/2020    TOE HAMMER REPAIR 2ND AND 3RD, DIPJ W/BONE BIOPSY OF 3RD performed by Ponce Johnson DPM at [address]      left    KNEE SURGERY      right    NECK SURGERY      OTHER SURGICAL HISTORY      lower back    RECTAL PROLAPSE REPAIR      SHOULDER SURGERY      SPINE SURGERY      TONSILLECTOMY      TUBAL LIGATION      bilateral    UPPER GASTROINTESTINAL ENDOSCOPY  2007    funal gastric polyp      Family History   Problem Relation Age of Onset    Heart Disease Mother     Stroke Father      Outpatient Medications Marked as Taking for the 6/8/21 encounter (Office Visit) with Juan Cox MD   Medication Sig Dispense Refill    nitrofurantoin (MACRODANTIN) 100 MG capsule Take 1 capsule by mouth daily 30 capsule 3    Aluminum & Magnesium Hydroxide (MAGNESIUM-ALUMINUM PO) Take by mouth      benzonatate (TESSALON) 100 MG capsule Take 100 mg by mouth 3 times daily as needed for Cough daily.      Dextromethorphan-Guaifenesin (MUCINEX DM MAXIMUM STRENGTH)  MG TB12 Take  by mouth.  HYDROcodone-acetaminophen (NORCO) 5-325 MG per tablet Take 1 tablet by mouth daily as needed.  naproxen (NAPROSYN) 500 MG tablet Take 500 mg by mouth daily as needed.  SUMAtriptan (IMITREX) 100 MG tablet Take 100 mg by mouth once as needed.  zolpidem (AMBIEN) 10 MG tablet Take 10 mg by mouth nightly as needed.  atorvastatin (LIPITOR) 40 MG tablet Take 40 mg by mouth daily.  montelukast (SINGULAIR) 10 MG tablet Take 10 mg by mouth nightly. (All medications reviewed and updated by provider sincelast office visit or hospitalization)   Latex, Nickel, Benadryl [diphenhydramine], Cefadroxil, Cephalexin, Demerol hcl [meperidine], Erythromycin, Lansoprazole, Lomefloxacin, Loracarbef, Meperidine hcl, Other, Percocet [oxycodone-acetaminophen], Pneumococcal vaccines, Seroquel [quetiapine fumarate], and Adhesive tape  Social History     Tobacco Use   Smoking Status Never Smoker   Smokeless Tobacco Never Used      (If patient a smoker, smoking cessation counseling offered)     Social History     Substance and Sexual Activity   Alcohol Use No       REVIEW OF SYSTEMS:  Review of Systems      Physical Exam:      Vitals:    06/08/21 1032   BP: 130/84   Pulse: 99   Temp: 97.1 °F (36.2 °C)     There is no height or weight on file to calculate BMI. Patient is a 74 y.o. female in noacute distress and alert and oriented to person, place and time. Physical Exam  Constitutional: Patient in no acute distress. Neuro: Alert andoriented to person, place and time. Psych: Mood normal, affect normal  Lungs: Respiratory effort is normal  Cardiovascular: Warm & Pink  Abdomen: Soft, non-tender, non-distended with no CVA,  No flank tenderness,  Or hepatosplenomegaly   Lymphatics: No palpable lymphadenopathy. Bladder non-tender and not distended.   Musculoskeletal: Normalgait and station      and Plan 1. Recurrent UTI           Plan:         She is doing well. No recent infections. Cysto and CT were negative. Will start daily macrodantin. Return in about 1 year (around 6/8/2022). Prescriptions Ordered:  Orders Placed This Encounter   Medications    nitrofurantoin (MACRODANTIN) 100 MG capsule     Sig: Take 1 capsule by mouth daily     Dispense:  30 capsule     Refill:  3      Orders Placed:  No orders of the defined types were placed in this encounter. Gertrudis Medina MD    Agree with the ROS entered by the MA.

## 2021-07-14 ENCOUNTER — OFFICE VISIT (OUTPATIENT)
Dept: PODIATRY | Age: 66
End: 2021-07-14
Payer: MEDICARE

## 2021-07-14 VITALS — BODY MASS INDEX: 25.52 KG/M2 | HEIGHT: 60 IN | WEIGHT: 130 LBS

## 2021-07-14 DIAGNOSIS — M79.675 PAIN IN LEFT TOE(S): ICD-10-CM

## 2021-07-14 DIAGNOSIS — M20.42 HAMMER TOE OF LEFT FOOT: ICD-10-CM

## 2021-07-14 DIAGNOSIS — M19.072 OSTEOARTHRITIS OF TOE JOINT, LEFT: Primary | ICD-10-CM

## 2021-07-14 PROCEDURE — 99213 OFFICE O/P EST LOW 20 MIN: CPT | Performed by: PODIATRIST

## 2021-07-14 ASSESSMENT — ENCOUNTER SYMPTOMS
BACK PAIN: 1
VOMITING: 0
DIARRHEA: 0
CONSTIPATION: 0
NAUSEA: 0
COLOR CHANGE: 0

## 2021-07-14 NOTE — PROGRESS NOTES
[address]  Return Patient    Chief Complaint   Patient presents with    Follow-up     left foot, lots of pain comes and goes       Gianna Donnelly is a 74y.o. year old female who is here for follow up of burning in toes, swelling. She is still having pain in the 2nd and 3rd, and now the 4th toes, pain is a little better, using lidocaine solution. Legs still swollen, went for vascular evaluation, has follow up . Post op from foot surgery. Type: condylectomy right 2nd toe, arthroplasty third DIPJ with biopsy left. Vital signs are stable. Pain level is 1-3, waxing and waning. Has swelling still in her toes, burning, some swelling in the top of her left foot. She admits to not being able to sit still, and was very active after surgery. I have not seen her since 2 weeks after surgery, due to the holidays and weather. Patient denies N/V/F/C. Review of Systems   Constitutional: Negative for chills, diaphoresis, fatigue, fever and unexpected weight change. Cardiovascular: Positive for leg swelling. Gastrointestinal: Negative for constipation, diarrhea, nausea and vomiting. Musculoskeletal: Positive for arthralgias, back pain and gait problem. Negative for joint swelling. Skin: Negative for color change, pallor, rash and wound. Neurological: Negative for weakness and numbness. Vascular: DP and PT pulses palpable 2/4, Right Foot and 2/4 on the Left Foot. CFT <3 seconds, Right Foot and <3 seconds on the Left Foot. Edema is absent,  Right Foot and presenton the Left Foot, minimal today. Varicosities present bilateral feet, ankles, legs. Neurological:   Sensation present  to light touch to level of digits, both feet. Musculoskeletal:   Muscle strength is 5/5 on the Right Foot and 5/5 on the Left Foot. Structural deformities are absent on the Right Foot and absent on the Left Foot.    Slight edema to the left 2nd and 3rd toe, contracted toe left 2nd, semi reducible. Pain to palpation in general to left toes 2,3. Minimal edema dorsal left foot. Integument:  Warm, dry, supple both feet. Incisions are healing well. Wound dehiscence is absent. Infection is absent. Radiographs: 3 views left Foot:  Arthroplasty of the third distal interphalangeal joint. Removal of the medial condyle of the head of the second proximal phalanx. Assesment : Post operative progress    Diagnosis Orders   1. Osteoarthritis of toe joint, left     2. Pain in left toe(s)     3. Hammer toe of left foot           Plan: Pt was evaluated and examined. Patient was given personalized discharge instructions. Pt will follow up in as needed or sooner if any problems arise. Diagnosis was discussed with the pt and all of their questions were answered in detail. Proper foot hygiene and care was discussed with the pt. Patient to check feet daily and contact the office with any questions/problems/concerns. Other comorbidity noted and will be managed by PCP. Regular shoe, stiff sole, may need new shoes  May consider orthotics   Rx [employer] cream with lidocaine  Toe crest for the left      Orders Placed This Encounter   Medications    Diclofenac Sodium POWD     Sig: Formula #5: Diclo3%+Gaba6%+Lido2%+Prilo2%.  Apply 1-2gm topically 3-4 times daily     Dispense:  120 g     Refill:  0       Follow up as needed

## 2021-08-16 ENCOUNTER — TELEPHONE (OUTPATIENT)
Dept: GASTROENTEROLOGY | Age: 66
End: 2021-08-16

## 2021-08-16 NOTE — TELEPHONE ENCOUNTER
Patient LM noting that she was recently in the hospital x7 days. Only thing new is start carafate. Patient was consulted with Dr. Trevor Bhatti at [facility] for a GI. Writer sees that patient is established with Dr. Digna Christianson at [facility]. Writer notes that since Dr. Faisal Villalobos placed her on carafate and is having a allergic reaction to stop the medication. Advised to follow up with Dr. Belen Zamorano office since she is following them and has upcoming colonoscopy and if they are taking over her care or if she needs us more on the GI end.    Direct line provided for her to call writer back once she follows up from Dr. Eleno galaviz

## 2021-09-24 ENCOUNTER — OFFICE VISIT (OUTPATIENT)
Dept: UROLOGY | Age: 66
End: 2021-09-24
Payer: MEDICARE

## 2021-09-24 VITALS
HEART RATE: 98 BPM | SYSTOLIC BLOOD PRESSURE: 134 MMHG | TEMPERATURE: 96.3 F | BODY MASS INDEX: 25.52 KG/M2 | WEIGHT: 130 LBS | RESPIRATION RATE: 17 BRPM | DIASTOLIC BLOOD PRESSURE: 82 MMHG | HEIGHT: 60 IN

## 2021-09-24 DIAGNOSIS — N39.0 RECURRENT UTI: Primary | ICD-10-CM

## 2021-09-24 PROCEDURE — 99213 OFFICE O/P EST LOW 20 MIN: CPT | Performed by: UROLOGY

## 2021-09-24 RX ORDER — DEXTROMETHORPHAN POLISTIREX 30 MG/5ML
30 SUSPENSION ORAL
Status: ON HOLD | COMMUNITY
End: 2022-04-07 | Stop reason: HOSPADM

## 2021-09-24 RX ORDER — UREA 10 %
1250 LOTION (ML) TOPICAL DAILY
COMMUNITY
End: 2022-05-31

## 2021-09-24 RX ORDER — CLINDAMYCIN HYDROCHLORIDE 300 MG/1
300 CAPSULE ORAL
Status: ON HOLD | COMMUNITY
End: 2022-04-07 | Stop reason: HOSPADM

## 2021-09-24 RX ORDER — DOXYCYCLINE HYCLATE 100 MG/1
100 CAPSULE ORAL
Status: ON HOLD | COMMUNITY
Start: 2021-09-16 | End: 2022-04-07 | Stop reason: HOSPADM

## 2021-09-24 ASSESSMENT — ENCOUNTER SYMPTOMS
COUGH: 0
BACK PAIN: 0
WHEEZING: 0
EYE REDNESS: 0
ABDOMINAL PAIN: 0
DIARRHEA: 0
VOMITING: 0
SHORTNESS OF BREATH: 0
CONSTIPATION: 0
EYE PAIN: 0
NAUSEA: 0

## 2021-09-24 NOTE — LETTER
[address] [address]  Dept: [phone]  Dept Fax: [phone]        9/24/21    Patient: Dulce Maria Valentin  YOB: 1955    Dear Ritika Beckford MD,    I had the pleasure of seeing one of your patients, Tracy Lepe today in the office today. Below are the relevant portions of my assessment and plan of care. IMPRESSION:  1. Recurrent UTI        PLAN:  She has a h/o UTIs. Work up was negative. Will give order for culture next time she is symptomatic. Return in about 6 months (around 3/24/2022). Prescriptions Ordered:  No orders of the defined types were placed in this encounter. Orders Placed:  Orders Placed This Encounter   Procedures    Culture, Urine     Standing Status:   Future     Standing Expiration Date:   9/24/2022     Order Specific Question:   Specify (ex-cath, midstream, cysto, etc)? Answer:   clean catch         Thank you for allowing me to participate in the care of this patient. I will keep you updated on this patient's follow up and I look forward to serving you and your patients again in the future.         Rody Gordon MD

## 2021-09-24 NOTE — PROGRESS NOTES
[address] [address]  Dept: [address] [facility] Urology Office Note - Established    Patient:  Pam Bradford  YOB: 1955  Date: 9/24/2021    The patient is a 74 y.o. female whopresents today for evaluation of the following problems:   Chief Complaint   Patient presents with    Follow-up     3month recurrent uti       HPI  She is here for recurrent UTIs. She thinks she may have had 1 UTI in the last 3 months. She did not have a culture done. She had a negative cysto. CT was negative as well. She did have an infection in April, which was E.Coli. Summary of old records: N/A    Additional History: N/A    Procedures Today: N/A    Urinalysis today:  No results found for this visit on 09/24/21. Imaging Reviewed during this Office Visit: none  (results were independently reviewed by physician and radiology report verified)    AUA Symptom Score (9/24/2021):   INCOMPLETE EMPTYING: How often have you had the sensation of not emptying your bladder?: Not at all  FREQUENCY: How often do you have to urinate less than every two hours?: Not at all  INTERMITTENCY: How often have you found you stopped and started again several times when you urinated?: Not at all  URGENCY: How often have you found it difficult to postpone urination?: Not at all  WEAK STREAM: How often have you had a weak urinary stream?: Not at all  STRAINING: How often have you had to strain to start  urination?: Not at all  NOCTURIA: How many times did you typically get up at night to uriniate?: NONE  TOTAL I-PSS SCORE[de-identified] 0       Last BUN and creatinine:  Lab Results   Component Value Date    BUN 19 11/06/2020     Lab Results   Component Value Date    CREATININE 1.23 (H) 11/06/2020       Additional Lab/Culture results: WBC 3.2, Cr 1.26    PAST MEDICAL, FAMILY AND SOCIAL HISTORY UPDATE:  Past Medical History:   Diagnosis Date    Abdominal pain  Arthritis     Asthma     Benign hypertension with CKD (chronic kidney disease) stage III (HCC)     CKD (chronic kidney disease) stage 3, GFR 30-59 ml/min ([facility])     GERD (gastroesophageal reflux disease)     Hyperlipidemia     Hypothyroidism     MVP (mitral valve prolapse)     Rectal bleeding     Scarlet fever     Sjogren - Ragini's syndrome      Past Surgical History:   Procedure Laterality Date    APPENDECTOMY      BLADDER SURGERY      Bladder and rectum reconstruction    BREAST SURGERY Left     biopsy    COLONOSCOPY  01/22/16     RECALL 10 YRS , RECTAL SCAR BIOPSY COLONIC MUCOSA WITH FOCAL ULCER AND GRANULATION TISSUE     DENTAL SURGERY      complete teeth extraction    ELBOW SURGERY      EYE SURGERY      bilateral cataract    HAMMER TOE SURGERY Left 11/20/2020    TOE HAMMER REPAIR 2ND AND 3RD, DIPJ W/BONE BIOPSY OF 3RD performed by Benjamin Castellon DPM at [address]      left    KNEE SURGERY      right    NECK SURGERY      OTHER SURGICAL HISTORY      lower back    RECTAL PROLAPSE REPAIR      SHOULDER SURGERY      SPINE SURGERY      TONSILLECTOMY      TUBAL LIGATION      bilateral    UPPER GASTROINTESTINAL ENDOSCOPY  2007    funal gastric polyp      Family History   Problem Relation Age of Onset    Heart Disease Mother     Stroke Father      Outpatient Medications Marked as Taking for the 9/24/21 encounter (Office Visit) with Krish Wallis MD   Medication Sig Dispense Refill    Diclofenac Sodium POWD Formula #5: Diclo3%+Gaba6%+Lido2%+Prilo2%.  Apply 1-2gm topically 3-4 times daily 120 g 0    Aluminum & Magnesium Hydroxide (MAGNESIUM-ALUMINUM PO) Take by mouth      benzonatate (TESSALON) 100 MG capsule Take 100 mg by mouth 3 times daily as needed for Cough      cetirizine (ZYRTEC) 10 MG tablet Take 10 mg by mouth daily      fluticasone (FLOVENT HFA) 110 MCG/ACT inhaler Inhale 1 puff into the lungs 2 times daily      gabapentin (NEURONTIN) 300 MG capsule Take 100 mg by mouth daily.  fluocinonide (LIDEX) 0.05 % cream Apply topically 2 times daily. 30 g 0    albuterol sulfate  (90 Base) MCG/ACT inhaler Inhale 2 puffs into the lungs      benztropine (COGENTIN) 1 MG tablet Take 1.5 mg by mouth daily       calcium carbonate (OYSTER SHELL CALCIUM 500 MG) 1250 (500 Ca) MG tablet Take 1,250 mg by mouth      estradiol (ESTRACE) 0.1 MG/GM vaginal cream See Admin Instructions 2 times a wek      lamoTRIgine (LAMICTAL) 25 MG tablet lamotrigine 25 mg tablet      Multiple Vitamins-Minerals (EYE VITAMINS & MINERALS) TABS Eye Vitamin and Minerals      fenofibrate (TRICOR) 145 MG tablet TAKE 1 TABLET BY MOUTH ONE TIME A DAY WITH a MEAL  3    hydrOXYzine (ATARAX) 10 MG tablet hydroxyzine HCl 10 mg tablet      Krill Oil 500 MG CAPS Take 1 capsule by mouth      nystatin (MYCOSTATIN) 205021 UNIT/GM powder Apply topically      omega-3 acid ethyl esters (LOVAZA) 1 g capsule Take 2 g by mouth      POLYETHYLENE GLYCOL 3350 PO Take 17 g by mouth      potassium chloride (KLOR-CON M) 10 MEQ extended release tablet Take 10 mEq by mouth 2 times daily       Skin Protectants, Misc. (EUCERIN) cream Apply topically      glucosamine-chondroitin 500-400 MG tablet Take 1 tablet by mouth      conjugated estrogens (PREMARIN) 0.625 MG/GM vaginal cream Place 0.5 g vaginally twice a week 1 Tube 6    Levothyroxine Sodium 75 MCG CAPS Take 75 mcg by mouth Daily       cyclobenzaprine (FLEXERIL) 10 MG tablet       vilazodone HCl (VILAZODONE HCL) 40 MG TABS Take 40 mg by mouth daily      L-Lysine 500 MG CAPS Take  by mouth.  vitamin E 400 UNIT capsule Take 400 Units by mouth daily.  Multiple Vitamins-Minerals (THERAPEUTIC MULTIVITAMIN-MINERALS) tablet Take 1 tablet by mouth daily.  Dextromethorphan-Guaifenesin (MUCINEX DM MAXIMUM STRENGTH)  MG TB12 Take  by mouth.       HYDROcodone-acetaminophen (NORCO) 5-325 MG per tablet Take 1 tablet by mouth daily as needed.  naproxen (NAPROSYN) 500 MG tablet Take 500 mg by mouth daily as needed.  SUMAtriptan (IMITREX) 100 MG tablet Take 100 mg by mouth once as needed.  zolpidem (AMBIEN) 10 MG tablet Take 10 mg by mouth nightly as needed.  atorvastatin (LIPITOR) 40 MG tablet Take 40 mg by mouth daily.  montelukast (SINGULAIR) 10 MG tablet Take 10 mg by mouth nightly. (All medications reviewed and updated by provider sincelast office visit or hospitalization)   Latex, Nickel, Benadryl [diphenhydramine], Cefadroxil, Cephalexin, Demerol hcl [meperidine], Erythromycin, Lansoprazole, Lomefloxacin, Loracarbef, Meperidine hcl, Other, Percocet [oxycodone-acetaminophen], Pneumococcal vaccines, Seroquel [quetiapine fumarate], and Adhesive tape  Social History     Tobacco Use   Smoking Status Never Smoker   Smokeless Tobacco Never Used      (If patient a smoker, smoking cessation counseling offered)     Social History     Substance and Sexual Activity   Alcohol Use No       REVIEW OF SYSTEMS:  Review of Systems      Physical Exam:      Vitals:    09/24/21 0856   BP: 134/82   Pulse: 98   Resp: 17   Temp: 96.3 °F (35.7 °C)     Body mass index is 25.39 kg/m². Patient is a 74 y.o. female in noacute distress and alert and oriented to person, place and time. Physical Exam  Constitutional: Patient in no acute distress. Neuro: Alert andoriented to person, place and time. Psych: Mood normal, affect normal  Lungs: Respiratory effort is normal  Cardiovascular: Warm & Pink  Abdomen: Soft, non-tender, non-distended with no CVA,  No flank tenderness,  Or hepatosplenomegaly   Lymphatics: No palpable lymphadenopathy. Bladder non-tender and not distended. Musculoskeletal: Normalgait and station      and Plan      1. Recurrent UTI           Plan:         She has a h/o UTIs. Work up was negative. Will give order for culture next time she is symptomatic.    Return in about 6 months (around 3/24/2022). Prescriptions Ordered:  No orders of the defined types were placed in this encounter. Orders Placed:  Orders Placed This Encounter   Procedures    Culture, Urine     Standing Status:   Future     Standing Expiration Date:   9/24/2022     Order Specific Question:   Specify (ex-cath, midstream, cysto, etc)? Answer:   clean catch            Noemí Benitez MD    Agree with the ROS entered by the MA.

## 2021-10-26 ENCOUNTER — OFFICE VISIT (OUTPATIENT)
Dept: PODIATRY | Age: 66
End: 2021-10-26

## 2021-10-26 VITALS — BODY MASS INDEX: 25.52 KG/M2 | HEIGHT: 60 IN | WEIGHT: 130 LBS

## 2021-10-26 DIAGNOSIS — M19.072 OSTEOARTHRITIS OF TOE JOINT, LEFT: ICD-10-CM

## 2021-10-26 DIAGNOSIS — R60.0 EDEMA OF LEFT LOWER LEG: Primary | ICD-10-CM

## 2021-10-26 DIAGNOSIS — M20.42 HAMMER TOE OF LEFT FOOT: ICD-10-CM

## 2021-10-26 DIAGNOSIS — R60.0 EDEMA OF RIGHT LOWER LEG: ICD-10-CM

## 2021-10-26 PROCEDURE — 99213 OFFICE O/P EST LOW 20 MIN: CPT | Performed by: PODIATRIST

## 2021-10-26 ASSESSMENT — ENCOUNTER SYMPTOMS
CONSTIPATION: 0
COLOR CHANGE: 0
NAUSEA: 0
BACK PAIN: 1
DIARRHEA: 0
VOMITING: 0

## 2021-10-26 NOTE — PROGRESS NOTES
[address]  Return Patient    Chief Complaint   Patient presents with    Follow-up     left foot    Toe Pain     right hallux       Nat French is a 74y.o. year old female who is here for follow up of burning in toes, swelling. She is still having pain in the 2nd and 4th toes, edema b/l LE with some neuropathic pain. Tight shoe gear noted - sketchers    Post op from foot surgery. Type: condylectomy right 2nd toe, arthroplasty third DIPJ with biopsy left. Vital signs are stable. Patient denies N/V/F/C. Review of Systems   Constitutional: Negative for chills, diaphoresis, fatigue, fever and unexpected weight change. Cardiovascular: Positive for leg swelling. Gastrointestinal: Negative for constipation, diarrhea, nausea and vomiting. Musculoskeletal: Positive for arthralgias, back pain and gait problem. Negative for joint swelling. Skin: Negative for color change, pallor, rash and wound. Neurological: Negative for weakness and numbness. Vascular: DP and PT pulses palpable 2/4, Right Foot and 2/4 on the Left Foot. CFT <3 seconds, Right Foot and <3 seconds on the Left Foot. Edema is present b/l LE non-pitting,  Right Foot and presenton the Left Foot, minimal today. Varicosities present bilateral feet, ankles, legs. Neurological:   Sensation present  to light touch to level of digits, both feet. Musculoskeletal:   Muscle strength is 5/5 on the Right Foot and 5/5 on the Left Foot. Structural deformities are absent on the Right Foot and absent on the Left Foot. Slight edema to the left 2nd and 3rd toe, contracted toe left 2nd, semi reducible. Pain to palpation in general to left toes 2,3. Minimal edema dorsal left foot. Integument:  Warm, dry, supple both feet. Incisions are healing well. Wound dehiscence is absent. Infection is absent. Radiographs: 3 views left Foot:  Arthroplasty of the third distal interphalangeal joint.  Removal of the medial condyle of the head of the second proximal phalanx. Assesment : Post operative progress    Diagnosis Orders   1. Edema of left lower leg     2. Hammer toe of left foot     3. Edema of right lower leg     4. Osteoarthritis of toe joint, left           Plan: Pt was evaluated and examined. Patient was given personalized discharge instructions. Pt will follow up in as needed or sooner if any problems arise. Diagnosis was discussed with the pt and all of their questions were answered in detail. Proper foot hygiene and care was discussed with the pt. Patient to check feet daily and contact the office with any questions/problems/concerns. Other comorbidity noted and will be managed by PCP. Regular shoe, stiff sole, may need new shoes - no sketchers  Toe crest for the left to relieve 4th toe pain  Compression dressings recommended b/l LE - apply before getting out of bed in the morning      No orders of the defined types were placed in this encounter.       Follow up as needed

## 2022-02-28 ENCOUNTER — TELEPHONE (OUTPATIENT)
Dept: GASTROENTEROLOGY | Age: 67
End: 2022-02-28

## 2022-02-28 NOTE — TELEPHONE ENCOUNTER
Pt lvm to schedule appt. Reviewed chart and per TE in August, pt was consulted by Dr. Veronica Aragon @ [facility].

## 2022-03-22 NOTE — PROGRESS NOTES
[address] [facility] [address]  Dept: [address] [facility] Urology Office Note - Established    Patient:  Macey Escobar  YOB: 1955  Date: 3/23/2022    The patient is a 69 y.o. female whopresents today for evaluation of the following problems:   Chief Complaint   Patient presents with    6 Month Follow-Up     recurrent UTI, no issues       HPI   Patient is presenting for f/u recurrent UTIs. Had negative workup in past:    - 3/9/21: cysto normal, there was mass indenting into bladder.   - 8/6/21: CT abd/pelvis with contrast negative. She has been doing well over the last six months, she was prescribed macrobid daily for 3 mos, refills ran out approximately 3 mos ago. She is prescribed vaginal estrogen cream from PCP, uses only as needed. She denies having any UTI symptoms, chart reviewed and no recent urine cultures collected. No hematuria, dysuria, flank pain, or fevers. She is voiding without difficulty. She does admit that at times she has hallucinations, reports struggling with this for years- sees lucille for therapy. Also mentions she had a spinal surgery with Dr. Jade Jefferson in January 2022, site got infected and was admitted to [facility], she is doing better now and continues to follow with them. Summary of old records: N/A    Additional History: N/A    Procedures Today: N/A    Urinalysis today:  No results found for this visit on 03/23/22. Imaging Reviewed during this Office Visit:   Narrative    History:  Epigastric abdominal pain     Exam/Technique:  Images are obtained through the abdomen and pelvis with IV and oral contrast .  Multiplanar reformats are provided     Comparison:  4/2/2021 noncontrast CT of the abdomen and pelvis     Findings: Fernando Ross is no evidence of active pulmonary disease in the lung bases.      The liver, spleen, pancreas, adrenal glands, kidneys, and gallbladder display no significant abnormalities.  The abdominal aorta is of normal caliber throughout its length. There is no free intraperitoneal fluid or gas and no abnormal fluid collections in the abdomen or pelvis. No gross abnormalities of bowel loops are displayed. .     No gross abnormalities of the unopacified urinary bladder are demonstrated. Patient is status post hysterectomy with no other pelvic abnormality suggested     Extensive postoperative change again displayed in the lumbar spine. IMPRESSION:  Negative CT of the abdomen and pelvis. All CT scans at this facility use dose modulation, iterative reconstruction, and/or weight based dosing when appropriate to reduce radiation dose to as low as reasonably achievable.      Workstation:[id number]     Finalized by Christina Barcenas MD on 8/6/2021 4:49 PM    (results were independently reviewed by physician and radiology report verified)      Last BUN and creatinine:  Lab Results   Component Value Date    BUN 19 11/06/2020     Lab Results   Component Value Date    CREATININE 1.23 (H) 11/06/2020       Additional Lab/Culture results: none    PAST MEDICAL, FAMILY AND SOCIAL HISTORY UPDATE:  Past Medical History:   Diagnosis Date    Abdominal pain     Arthritis     Asthma     Benign hypertension with CKD (chronic kidney disease) stage III ([address]     CKD (chronic kidney disease) stage 3, GFR 30-59 ml/min ([facility])     GERD (gastroesophageal reflux disease)     Hyperlipidemia     Hypothyroidism     MVP (mitral valve prolapse)     Rectal bleeding     Scarlet fever     Sjogren - Ragini's syndrome      Past Surgical History:   Procedure Laterality Date    APPENDECTOMY      BLADDER SURGERY      Bladder and rectum reconstruction    BREAST SURGERY Left     biopsy    COLONOSCOPY  01/22/16     RECALL 10 YRS , RECTAL SCAR BIOPSY COLONIC MUCOSA WITH FOCAL ULCER AND GRANULATION TISSUE     DENTAL SURGERY      complete teeth extraction    ELBOW SURGERY      EYE SURGERY      bilateral cataract    HAMMER TOE SURGERY Left 11/20/2020    TOE HAMMER REPAIR 2ND AND 3RD, DIPJ W/BONE BIOPSY OF 3RD performed by Chani Molina DPM at [address]      left    KNEE SURGERY      right    NECK SURGERY      OTHER SURGICAL HISTORY      lower back    RECTAL PROLAPSE REPAIR      SHOULDER SURGERY      SPINE SURGERY      TONSILLECTOMY      TUBAL LIGATION      bilateral    UPPER GASTROINTESTINAL ENDOSCOPY  2007    funal gastric polyp      Family History   Problem Relation Age of Onset    Heart Disease Mother     Stroke Father      Outpatient Medications Marked as Taking for the 3/23/22 encounter (Office Visit) with LULÚ Tobias - CNP   Medication Sig Dispense Refill    doxycycline hyclate (VIBRAMYCIN) 100 MG capsule 100 mg      dextromethorphan (DELSYM) 30 MG/5ML extended release liquid 30 mg      clindamycin (CLEOCIN) 300 MG capsule 300 mg      calcium carbonate (OS-ZULLY) 1250 (500 Ca) MG chewable tablet Take 1,250 mg by mouth daily      MM CETIRIZINE HCL PO 10 mg      Diclofenac Sodium POWD Formula #5: Diclo3%+Gaba6%+Lido2%+Prilo2%. Apply 1-2gm topically 3-4 times daily 120 g 0    Aluminum & Magnesium Hydroxide (MAGNESIUM-ALUMINUM PO) Take by mouth      benzonatate (TESSALON) 100 MG capsule Take 100 mg by mouth 3 times daily as needed for Cough      fluticasone (FLOVENT HFA) 110 MCG/ACT inhaler Inhale 1 puff into the lungs 2 times daily      gabapentin (NEURONTIN) 300 MG capsule Take 100 mg by mouth daily.  fluocinonide (LIDEX) 0.05 % cream Apply topically 2 times daily.  30 g 0    albuterol sulfate  (90 Base) MCG/ACT inhaler Inhale 2 puffs into the lungs      benztropine (COGENTIN) 1 MG tablet Take 1.5 mg by mouth daily       calcium carbonate (OYSTER SHELL CALCIUM 500 MG) 1250 (500 Ca) MG tablet Take 1,250 mg by mouth      estradiol (ESTRACE) 0.1 MG/GM vaginal cream See Admin Instructions 2 times a wek      lamoTRIgine (LAMICTAL) 25 MG tablet lamotrigine 25 mg tablet      Multiple Vitamins-Minerals (EYE VITAMINS & MINERALS) TABS Eye Vitamin and Minerals      fenofibrate (TRICOR) 145 MG tablet TAKE 1 TABLET BY MOUTH ONE TIME A DAY WITH a MEAL  3    hydrOXYzine (ATARAX) 10 MG tablet hydroxyzine HCl 10 mg tablet      Krill Oil 500 MG CAPS Take 1 capsule by mouth      nystatin (MYCOSTATIN) 562349 UNIT/GM powder Apply topically      omega-3 acid ethyl esters (LOVAZA) 1 g capsule Take 2 g by mouth      POLYETHYLENE GLYCOL 3350 PO Take 17 g by mouth      potassium chloride (KLOR-CON M) 10 MEQ extended release tablet Take 10 mEq by mouth 2 times daily       Skin Protectants, Misc. (EUCERIN) cream Apply topically      glucosamine-chondroitin 500-400 MG tablet Take 1 tablet by mouth      conjugated estrogens (PREMARIN) 0.625 MG/GM vaginal cream Place 0.5 g vaginally twice a week 1 Tube 6    Levothyroxine Sodium 75 MCG CAPS Take 75 mcg by mouth Daily       cyclobenzaprine (FLEXERIL) 10 MG tablet       vilazodone HCl (VILAZODONE HCL) 40 MG TABS Take 40 mg by mouth daily      L-Lysine 500 MG CAPS Take  by mouth.  vitamin E 400 UNIT capsule Take 400 Units by mouth daily.  Multiple Vitamins-Minerals (THERAPEUTIC MULTIVITAMIN-MINERALS) tablet Take 1 tablet by mouth daily.  Dextromethorphan-Guaifenesin (MUCINEX DM MAXIMUM STRENGTH)  MG TB12 Take  by mouth.  HYDROcodone-acetaminophen (NORCO) 5-325 MG per tablet Take 1 tablet by mouth daily as needed.  naproxen (NAPROSYN) 500 MG tablet Take 500 mg by mouth daily as needed.  SUMAtriptan (IMITREX) 100 MG tablet Take 100 mg by mouth once as needed.  zolpidem (AMBIEN) 10 MG tablet Take 10 mg by mouth nightly as needed.  atorvastatin (LIPITOR) 40 MG tablet Take 40 mg by mouth daily.  montelukast (SINGULAIR) 10 MG tablet Take 10 mg by mouth nightly.         (All medications reviewed and updated by provider sincelast office visit or hospitalization)   Latex, Nickel, Benadryl [diphenhydramine], Cefadroxil, Cephalexin, Cyclosporine, Demerol hcl [meperidine], Erythromycin, Lansoprazole, Lomefloxacin, Loracarbef, Meperidine hcl, Other, Percocet [oxycodone-acetaminophen], Pneumococcal vaccines, Seroquel [quetiapine fumarate], and Adhesive tape  Social History     Tobacco Use   Smoking Status Never Smoker   Smokeless Tobacco Never Used      (If patient a smoker, smoking cessation counseling offered)     Social History     Substance and Sexual Activity   Alcohol Use No       REVIEW OF SYSTEMS:  Review of Systems      Physical Exam:      Vitals:    03/23/22 1042   BP: 119/76   Pulse: 85   Temp: 96.5 °F (35.8 °C)     There is no height or weight on file to calculate BMI. Patient is a 69 y.o. female in noacute distress and alert and oriented to person, place and time. Physical Exam  Constitutional: Patient in no acute distress. Neuro: Alert andoriented to person, place and time. Psych: Mood normal, affect normal  Skin: No rash noted  Lungs: Respiratory effort is normal  Cardiovascular: Warm & Pink  Abdomen: Soft, non-tender, non-distended with no CVA,  No flank tenderness  Bladder non-tender and not distended. Musculoskeletal: Normal gait and station      and Plan      1. Recurrent UTI           Plan:    Doing well from UTI standpoint, denies UTI and UTI-like symptoms over the last 6 mos. She had negative workup in 2021. Continues vaginal estrace cream, pt prefers to take as needed (prescribe twice weekly). Encouraged to continue routine f/u with PCP who is prescriber. Recommend yearly mammograms. States she knows she is due for mammogram, believes she has order but she will [facility]. Feminine hygiene and UTI prevention discussed. Consider coconut oil to gera area several times per day for dryness. Call with any new or worsening urinary symptoms.     Return in about 1 year (around 3/23/2023), or if symptoms worsen or fail to improve. Prescriptions Ordered:  No orders of the defined types were placed in this encounter. Orders Placed:  Orders Placed This Encounter   Procedures    POCT Urine Dipstick            LULÚ Ferrell CNP    Reviewed and agree with the ROS entered by the MA.

## 2022-03-23 ENCOUNTER — OFFICE VISIT (OUTPATIENT)
Dept: UROLOGY | Age: 67
End: 2022-03-23
Payer: MEDICARE

## 2022-03-23 VITALS — TEMPERATURE: 96.5 F | HEART RATE: 85 BPM | DIASTOLIC BLOOD PRESSURE: 76 MMHG | SYSTOLIC BLOOD PRESSURE: 119 MMHG

## 2022-03-23 DIAGNOSIS — N39.0 RECURRENT UTI: Primary | ICD-10-CM

## 2022-03-23 LAB
APPEARANCE FLUID: NORMAL
BILIRUBIN, POC: NORMAL
BLOOD URINE, POC: NORMAL
CLARITY, POC: CLEAR
COLOR, POC: YELLOW
GLUCOSE URINE, POC: NORMAL
KETONES, POC: NORMAL
LEUKOCYTE EST, POC: NORMAL
NITRITE, POC: NORMAL
PH, POC: NORMAL
PROTEIN, POC: NORMAL
SPECIFIC GRAVITY, POC: NORMAL
UROBILINOGEN, POC: NORMAL

## 2022-03-23 PROCEDURE — 81002 URINALYSIS NONAUTO W/O SCOPE: CPT | Performed by: NURSE PRACTITIONER

## 2022-03-23 PROCEDURE — 99213 OFFICE O/P EST LOW 20 MIN: CPT | Performed by: NURSE PRACTITIONER

## 2022-03-23 ASSESSMENT — ENCOUNTER SYMPTOMS
COLOR CHANGE: 0
NAUSEA: 0
VOMITING: 0
SHORTNESS OF BREATH: 0
COUGH: 1
WHEEZING: 0
BACK PAIN: 0
EYE REDNESS: 0
ABDOMINAL PAIN: 0
EYE PAIN: 0

## 2022-03-25 ENCOUNTER — TELEPHONE (OUTPATIENT)
Dept: UROLOGY | Age: 67
End: 2022-03-25

## 2022-03-25 NOTE — TELEPHONE ENCOUNTER
I spoke with Dr. Sparks Neither regarding the appearance of a mass indenting into the bladder. CT was ordered as a follow-up which was negative (8/6/21 at [address]). Per Dr. Sparks Neither, if further concerns- patient may follow-up in office with him for further discussion.

## 2022-03-29 ENCOUNTER — TELEPHONE (OUTPATIENT)
Dept: UROLOGY | Age: 67
End: 2022-03-29

## 2022-03-29 NOTE — TELEPHONE ENCOUNTER
Spoke with patient. Information was given. Negative CT. Urine dip was negative also    Verbalized understanding.

## 2022-03-29 NOTE — TELEPHONE ENCOUNTER
Called patient on 3/28/2022 also on 3/29/39210 . Left message on voicemail regarding order per Helene Morton CNP . Told patient to call office if she has any concerns or questions . LOOK AT LAST OFFICE NOTE .

## 2022-04-04 ENCOUNTER — HOSPITAL ENCOUNTER (OUTPATIENT)
Age: 67
Setting detail: OBSERVATION
Discharge: HOME | End: 2022-04-07
Attending: EMERGENCY MEDICINE | Admitting: FAMILY MEDICINE
Payer: MEDICARE

## 2022-04-04 DIAGNOSIS — F23 ACUTE PSYCHOSIS (HCC): ICD-10-CM

## 2022-04-04 DIAGNOSIS — E87.6 HYPOKALEMIA: Primary | ICD-10-CM

## 2022-04-04 PROBLEM — R41.82 AMS (ALTERED MENTAL STATUS): Status: ACTIVE | Noted: 2022-04-04

## 2022-04-04 LAB
AMMONIA: 47 UMOL/L (ref 11–51)
AMPHETAMINE SCREEN URINE: NEGATIVE
ANION GAP SERPL CALCULATED.3IONS-SCNC: 14 MMOL/L (ref 9–17)
BARBITURATE SCREEN URINE: NEGATIVE
BENZODIAZEPINE SCREEN, URINE: NEGATIVE
BUN BLDV-MCNC: 15 MG/DL (ref 8–23)
CALCIUM SERPL-MCNC: 9.9 MG/DL (ref 8.6–10.4)
CANNABINOID SCREEN URINE: NEGATIVE
CHLORIDE BLD-SCNC: 99 MMOL/L (ref 98–107)
CO2: 25 MMOL/L (ref 20–31)
COCAINE METABOLITE, URINE: NEGATIVE
CREAT SERPL-MCNC: 1.04 MG/DL (ref 0.5–0.9)
GFR AFRICAN AMERICAN: >60 ML/MIN
GFR NON-AFRICAN AMERICAN: 53 ML/MIN
GFR SERPL CREATININE-BSD FRML MDRD: ABNORMAL ML/MIN/{1.73_M2}
GLUCOSE BLD-MCNC: 182 MG/DL (ref 70–99)
METHADONE SCREEN, URINE: NEGATIVE
OPIATES, URINE: NEGATIVE
OXYCODONE SCREEN URINE: NEGATIVE
PHENCYCLIDINE, URINE: NEGATIVE
POTASSIUM SERPL-SCNC: 3.1 MMOL/L (ref 3.7–5.3)
SARS-COV-2, RAPID: NOT DETECTED
SODIUM BLD-SCNC: 138 MMOL/L (ref 135–144)
SPECIMEN DESCRIPTION: NORMAL
TEST INFORMATION: NORMAL
THYROXINE, FREE: 0.85 NG/DL (ref 0.93–1.7)
TSH SERPL DL<=0.05 MIU/L-ACNC: 20.25 UIU/ML (ref 0.3–5)

## 2022-04-04 PROCEDURE — 80307 DRUG TEST PRSMV CHEM ANLYZR: CPT

## 2022-04-04 PROCEDURE — 84439 ASSAY OF FREE THYROXINE: CPT

## 2022-04-04 PROCEDURE — 84443 ASSAY THYROID STIM HORMONE: CPT

## 2022-04-04 PROCEDURE — 82607 VITAMIN B-12: CPT

## 2022-04-04 PROCEDURE — 36415 COLL VENOUS BLD VENIPUNCTURE: CPT

## 2022-04-04 PROCEDURE — 6360000002 HC RX W HCPCS: Performed by: EMERGENCY MEDICINE

## 2022-04-04 PROCEDURE — 87635 SARS-COV-2 COVID-19 AMP PRB: CPT

## 2022-04-04 PROCEDURE — 82746 ASSAY OF FOLIC ACID SERUM: CPT

## 2022-04-04 PROCEDURE — 83735 ASSAY OF MAGNESIUM: CPT

## 2022-04-04 PROCEDURE — 80048 BASIC METABOLIC PNL TOTAL CA: CPT

## 2022-04-04 PROCEDURE — G0378 HOSPITAL OBSERVATION PER HR: HCPCS

## 2022-04-04 PROCEDURE — 99282 EMERGENCY DEPT VISIT SF MDM: CPT

## 2022-04-04 PROCEDURE — 82140 ASSAY OF AMMONIA: CPT

## 2022-04-04 PROCEDURE — 93005 ELECTROCARDIOGRAM TRACING: CPT | Performed by: FAMILY MEDICINE

## 2022-04-04 PROCEDURE — 96365 THER/PROPH/DIAG IV INF INIT: CPT

## 2022-04-04 RX ORDER — LAMOTRIGINE 25 MG/1
25 TABLET ORAL DAILY
Status: DISCONTINUED | OUTPATIENT
Start: 2022-04-05 | End: 2022-04-05

## 2022-04-04 RX ORDER — ACETAMINOPHEN 650 MG/1
650 SUPPOSITORY RECTAL EVERY 6 HOURS PRN
Status: DISCONTINUED | OUTPATIENT
Start: 2022-04-04 | End: 2022-04-07 | Stop reason: HOSPADM

## 2022-04-04 RX ORDER — ONDANSETRON 4 MG/1
4 TABLET, ORALLY DISINTEGRATING ORAL EVERY 8 HOURS PRN
Status: DISCONTINUED | OUTPATIENT
Start: 2022-04-04 | End: 2022-04-07 | Stop reason: HOSPADM

## 2022-04-04 RX ORDER — ACETAMINOPHEN 325 MG/1
650 TABLET ORAL EVERY 6 HOURS PRN
Status: DISCONTINUED | OUTPATIENT
Start: 2022-04-04 | End: 2022-04-07 | Stop reason: HOSPADM

## 2022-04-04 RX ORDER — HYDROCODONE BITARTRATE AND ACETAMINOPHEN 5; 325 MG/1; MG/1
1 TABLET ORAL EVERY 6 HOURS PRN
Status: DISCONTINUED | OUTPATIENT
Start: 2022-04-04 | End: 2022-04-07 | Stop reason: HOSPADM

## 2022-04-04 RX ORDER — ATORVASTATIN CALCIUM 40 MG/1
40 TABLET, FILM COATED ORAL DAILY
Status: DISCONTINUED | OUTPATIENT
Start: 2022-04-05 | End: 2022-04-07 | Stop reason: HOSPADM

## 2022-04-04 RX ORDER — HALOPERIDOL 1 MG/1
0.5 TABLET ORAL EVERY 6 HOURS PRN
Status: DISCONTINUED | OUTPATIENT
Start: 2022-04-04 | End: 2022-04-07 | Stop reason: HOSPADM

## 2022-04-04 RX ORDER — POTASSIUM CHLORIDE 7.45 MG/ML
10 INJECTION INTRAVENOUS ONCE
Status: COMPLETED | OUTPATIENT
Start: 2022-04-04 | End: 2022-04-04

## 2022-04-04 RX ORDER — ONDANSETRON 2 MG/ML
4 INJECTION INTRAMUSCULAR; INTRAVENOUS EVERY 6 HOURS PRN
Status: DISCONTINUED | OUTPATIENT
Start: 2022-04-04 | End: 2022-04-07 | Stop reason: HOSPADM

## 2022-04-04 RX ORDER — POTASSIUM CHLORIDE 7.45 MG/ML
10 INJECTION INTRAVENOUS PRN
Status: DISCONTINUED | OUTPATIENT
Start: 2022-04-04 | End: 2022-04-07 | Stop reason: HOSPADM

## 2022-04-04 RX ORDER — ALBUTEROL SULFATE 90 UG/1
2 AEROSOL, METERED RESPIRATORY (INHALATION) EVERY 4 HOURS PRN
Status: DISCONTINUED | OUTPATIENT
Start: 2022-04-04 | End: 2022-04-07 | Stop reason: HOSPADM

## 2022-04-04 RX ORDER — LEVOTHYROXINE SODIUM 0.07 MG/1
75 TABLET ORAL DAILY
Status: DISCONTINUED | OUTPATIENT
Start: 2022-04-05 | End: 2022-04-05

## 2022-04-04 RX ORDER — MAGNESIUM SULFATE 1 G/100ML
1000 INJECTION INTRAVENOUS PRN
Status: DISCONTINUED | OUTPATIENT
Start: 2022-04-04 | End: 2022-04-07 | Stop reason: HOSPADM

## 2022-04-04 RX ORDER — SODIUM CHLORIDE 0.9 % (FLUSH) 0.9 %
10 SYRINGE (ML) INJECTION EVERY 12 HOURS SCHEDULED
Status: DISCONTINUED | OUTPATIENT
Start: 2022-04-04 | End: 2022-04-07 | Stop reason: HOSPADM

## 2022-04-04 RX ORDER — SODIUM CHLORIDE 9 MG/ML
INJECTION, SOLUTION INTRAVENOUS PRN
Status: DISCONTINUED | OUTPATIENT
Start: 2022-04-04 | End: 2022-04-07 | Stop reason: HOSPADM

## 2022-04-04 RX ORDER — POTASSIUM CHLORIDE 20 MEQ/1
40 TABLET, EXTENDED RELEASE ORAL PRN
Status: DISCONTINUED | OUTPATIENT
Start: 2022-04-04 | End: 2022-04-07 | Stop reason: HOSPADM

## 2022-04-04 RX ORDER — SODIUM CHLORIDE 0.9 % (FLUSH) 0.9 %
10 SYRINGE (ML) INJECTION PRN
Status: DISCONTINUED | OUTPATIENT
Start: 2022-04-04 | End: 2022-04-07 | Stop reason: HOSPADM

## 2022-04-04 RX ADMIN — POTASSIUM CHLORIDE 10 MEQ: 7.46 INJECTION, SOLUTION INTRAVENOUS at 22:30

## 2022-04-04 ASSESSMENT — ENCOUNTER SYMPTOMS
WHEEZING: 0
COLOR CHANGE: 0
ABDOMINAL PAIN: 0
SORE THROAT: 0
EYE PAIN: 0
SHORTNESS OF BREATH: 0
SINUS PRESSURE: 0
EYE REDNESS: 0
FACIAL SWELLING: 0
CONSTIPATION: 0
BLOOD IN STOOL: 0
TROUBLE SWALLOWING: 0
BACK PAIN: 0
VOMITING: 0
RHINORRHEA: 0
CHEST TIGHTNESS: 0
EYE DISCHARGE: 0
COUGH: 0
NAUSEA: 0
DIARRHEA: 0

## 2022-04-05 LAB
ABSOLUTE EOS #: 0.1 K/UL (ref 0–0.4)
ABSOLUTE LYMPH #: 1.1 K/UL (ref 1–4.8)
ABSOLUTE MONO #: 0.5 K/UL (ref 0.1–1.3)
ALBUMIN SERPL-MCNC: 3.5 G/DL (ref 3.5–5.2)
ALP BLD-CCNC: 87 U/L (ref 35–104)
ALT SERPL-CCNC: 18 U/L (ref 5–33)
ANION GAP SERPL CALCULATED.3IONS-SCNC: 12 MMOL/L (ref 9–17)
AST SERPL-CCNC: 38 U/L
BASOPHILS # BLD: 2 % (ref 0–2)
BASOPHILS ABSOLUTE: 0.1 K/UL (ref 0–0.2)
BILIRUB SERPL-MCNC: 0.51 MG/DL (ref 0.3–1.2)
BUN BLDV-MCNC: 14 MG/DL (ref 8–23)
CALCIUM SERPL-MCNC: 9.2 MG/DL (ref 8.6–10.4)
CHLORIDE BLD-SCNC: 103 MMOL/L (ref 98–107)
CO2: 25 MMOL/L (ref 20–31)
CREAT SERPL-MCNC: 1.03 MG/DL (ref 0.5–0.9)
EKG ATRIAL RATE: 89 BPM
EKG P AXIS: 79 DEGREES
EKG P-R INTERVAL: 150 MS
EKG Q-T INTERVAL: 384 MS
EKG QRS DURATION: 88 MS
EKG QTC CALCULATION (BAZETT): 467 MS
EKG R AXIS: 55 DEGREES
EKG T AXIS: 76 DEGREES
EKG VENTRICULAR RATE: 89 BPM
EOSINOPHILS RELATIVE PERCENT: 1 % (ref 0–4)
FOLATE: 12.4 NG/ML
GFR AFRICAN AMERICAN: >60 ML/MIN
GFR NON-AFRICAN AMERICAN: 53 ML/MIN
GFR SERPL CREATININE-BSD FRML MDRD: ABNORMAL ML/MIN/{1.73_M2}
GLUCOSE BLD-MCNC: 149 MG/DL (ref 70–99)
HCT VFR BLD CALC: 31.6 % (ref 36–46)
HEMOGLOBIN: 10.5 G/DL (ref 12–16)
LYMPHOCYTES # BLD: 30 % (ref 24–44)
MAGNESIUM: 1.8 MG/DL (ref 1.6–2.6)
MAGNESIUM: 1.9 MG/DL (ref 1.6–2.6)
MCH RBC QN AUTO: 27.5 PG (ref 26–34)
MCHC RBC AUTO-ENTMCNC: 33.1 G/DL (ref 31–37)
MCV RBC AUTO: 83.1 FL (ref 80–100)
MONOCYTES # BLD: 12 % (ref 1–7)
PDW BLD-RTO: 13.5 % (ref 11.5–14.9)
PLATELET # BLD: 299 K/UL (ref 150–450)
PMV BLD AUTO: 7.7 FL (ref 6–12)
POTASSIUM SERPL-SCNC: 3.1 MMOL/L (ref 3.7–5.3)
RBC # BLD: 3.8 M/UL (ref 4–5.2)
SEG NEUTROPHILS: 55 % (ref 36–66)
SEGMENTED NEUTROPHILS ABSOLUTE COUNT: 2 K/UL (ref 1.3–9.1)
SODIUM BLD-SCNC: 140 MMOL/L (ref 135–144)
TOTAL PROTEIN: 6.1 G/DL (ref 6.4–8.3)
VITAMIN B-12: 691 PG/ML (ref 232–1245)
WBC # BLD: 3.7 K/UL (ref 3.5–11)

## 2022-04-05 PROCEDURE — 2580000003 HC RX 258: Performed by: FAMILY MEDICINE

## 2022-04-05 PROCEDURE — 36415 COLL VENOUS BLD VENIPUNCTURE: CPT

## 2022-04-05 PROCEDURE — 97530 THERAPEUTIC ACTIVITIES: CPT

## 2022-04-05 PROCEDURE — 97166 OT EVAL MOD COMPLEX 45 MIN: CPT

## 2022-04-05 PROCEDURE — 80053 COMPREHEN METABOLIC PANEL: CPT

## 2022-04-05 PROCEDURE — 97161 PT EVAL LOW COMPLEX 20 MIN: CPT

## 2022-04-05 PROCEDURE — 6360000002 HC RX W HCPCS: Performed by: FAMILY MEDICINE

## 2022-04-05 PROCEDURE — 83735 ASSAY OF MAGNESIUM: CPT

## 2022-04-05 PROCEDURE — G0378 HOSPITAL OBSERVATION PER HR: HCPCS

## 2022-04-05 PROCEDURE — 90792 PSYCH DIAG EVAL W/MED SRVCS: CPT | Performed by: PSYCHIATRY & NEUROLOGY

## 2022-04-05 PROCEDURE — 92610 EVALUATE SWALLOWING FUNCTION: CPT

## 2022-04-05 PROCEDURE — 97535 SELF CARE MNGMENT TRAINING: CPT

## 2022-04-05 PROCEDURE — 96372 THER/PROPH/DIAG INJ SC/IM: CPT

## 2022-04-05 PROCEDURE — 6370000000 HC RX 637 (ALT 250 FOR IP): Performed by: FAMILY MEDICINE

## 2022-04-05 PROCEDURE — 93010 ELECTROCARDIOGRAM REPORT: CPT | Performed by: INTERNAL MEDICINE

## 2022-04-05 PROCEDURE — 85025 COMPLETE CBC W/AUTO DIFF WBC: CPT

## 2022-04-05 RX ORDER — LEVOTHYROXINE SODIUM 0.1 MG/1
100 TABLET ORAL DAILY
Status: DISCONTINUED | OUTPATIENT
Start: 2022-04-06 | End: 2022-04-07 | Stop reason: HOSPADM

## 2022-04-05 RX ORDER — ZOLPIDEM TARTRATE 5 MG/1
5 TABLET ORAL NIGHTLY PRN
Status: DISCONTINUED | OUTPATIENT
Start: 2022-04-05 | End: 2022-04-07 | Stop reason: HOSPADM

## 2022-04-05 RX ORDER — VILAZODONE HYDROCHLORIDE 40 MG/1
40 TABLET ORAL DAILY
Status: DISCONTINUED | OUTPATIENT
Start: 2022-04-05 | End: 2022-04-07 | Stop reason: HOSPADM

## 2022-04-05 RX ORDER — ARIPIPRAZOLE 5 MG/1
5 TABLET ORAL DAILY
COMMUNITY

## 2022-04-05 RX ORDER — ARIPIPRAZOLE 5 MG/1
5 TABLET ORAL DAILY
Status: DISCONTINUED | OUTPATIENT
Start: 2022-04-05 | End: 2022-04-07 | Stop reason: HOSPADM

## 2022-04-05 RX ORDER — POTASSIUM CHLORIDE 20 MEQ/1
20 TABLET, EXTENDED RELEASE ORAL 2 TIMES DAILY WITH MEALS
Status: DISCONTINUED | OUTPATIENT
Start: 2022-04-05 | End: 2022-04-07 | Stop reason: HOSPADM

## 2022-04-05 RX ADMIN — LEVOTHYROXINE SODIUM 75 MCG: 0.07 TABLET ORAL at 07:31

## 2022-04-05 RX ADMIN — ENOXAPARIN SODIUM 40 MG: 40 INJECTION SUBCUTANEOUS at 08:52

## 2022-04-05 RX ADMIN — Medication 400 MG: at 08:52

## 2022-04-05 RX ADMIN — ARIPIPRAZOLE 5 MG: 5 TABLET ORAL at 18:00

## 2022-04-05 RX ADMIN — SODIUM CHLORIDE, PRESERVATIVE FREE 10 ML: 5 INJECTION INTRAVENOUS at 08:52

## 2022-04-05 RX ADMIN — ATORVASTATIN CALCIUM 40 MG: 40 TABLET, FILM COATED ORAL at 08:52

## 2022-04-05 RX ADMIN — SODIUM CHLORIDE, PRESERVATIVE FREE 10 ML: 5 INJECTION INTRAVENOUS at 05:49

## 2022-04-05 RX ADMIN — SODIUM CHLORIDE, PRESERVATIVE FREE 10 ML: 5 INJECTION INTRAVENOUS at 21:05

## 2022-04-05 RX ADMIN — VILAZODONE HYDROCHLORIDE 40 MG: 40 TABLET ORAL at 18:00

## 2022-04-05 RX ADMIN — POTASSIUM CHLORIDE 20 MEQ: 20 TABLET, EXTENDED RELEASE ORAL at 18:00

## 2022-04-05 RX ADMIN — POTASSIUM CHLORIDE 20 MEQ: 20 TABLET, EXTENDED RELEASE ORAL at 08:52

## 2022-04-05 RX ADMIN — POTASSIUM CHLORIDE 40 MEQ: 20 TABLET, EXTENDED RELEASE ORAL at 08:51

## 2022-04-05 RX ADMIN — ZOLPIDEM TARTRATE 5 MG: 5 TABLET ORAL at 22:07

## 2022-04-05 ASSESSMENT — ENCOUNTER SYMPTOMS
COUGH: 0
VOMITING: 0
ABDOMINAL PAIN: 0
CHEST TIGHTNESS: 0
TROUBLE SWALLOWING: 0
COLOR CHANGE: 0
NAUSEA: 0
SHORTNESS OF BREATH: 0
EYE REDNESS: 0
BLOOD IN STOOL: 0
EYE ITCHING: 0

## 2022-04-05 ASSESSMENT — PAIN SCALES - GENERAL
PAINLEVEL_OUTOF10: 0
PAINLEVEL_OUTOF10: 0

## 2022-04-05 NOTE — CARE COORDINATION
CASE MANAGEMENT NOTE:    Admission Date:  4/4/2022 Quang Meyers is a 69 y.o.  female    Admitted for : Hypokalemia [E87.6]  Acute psychosis ([address] [F23]  AMS (altered mental status) [R41.82]    Met with:  Patient    PCP:  Franck Angulo                                Insurance:  Evy Stallings      Is patient alert and oriented at time of discussion:  Yes    Current Residence/ Living Arrangements:  independently at home with spouse             Current Services PTA:  Yes- [employer]     Does patient go to outpatient dialysis: No  If yes, location and chair time: NA    Is patient agreeable to VNS: No    Freedom of choice provided:  No    List of [address] provided: No    VNS chosen:  No    DME:  straight cane, walker and shower chair    Home Oxygen: No    Nebulizer: Yes    CPAP/BIPAP: No    Supplier: N/A    Potential Assistance Needed: No    SNF needed: No    Freedom of choice and list provided: No    Pharm[facility]:  [facility]       Does Patient want to use MEDS to BEDS? No    Is patient currently receiving oral anticoagulation therapy? No    Is the Patient an [facility] with Readmission Risk Score greater than 14%? No  If yes, pt needs a follow up appointment made within 7 days. Family Members/Caregivers that pt would like involved in their care:    Yes    If yes, list name here:  Theadore Plant and [place] Provider:  Family             Discharge Plan:  4/5 Evy Stallings From home with spouse. DME: [facility] [place], nebulizer. VNS: none. Pt goes to [facility] regularly and having recent med changes. Pt was admitted from [facility] last night for AMS. Psych consult, awaiting recommendations. K+ 3.1 and Mag 1.9 today, replaced. BUN 14 creat 1.03. Pt had recent cervical fusion and is wearing aspen collar.  Following for needs//JF              Electronically signed by: Leo Win RN on 4/5/2022 at 1:06 PM

## 2022-04-05 NOTE — FLOWSHEET NOTE
04/05/22 1014   Encounter Summary   Services provided to: Patient   Referral/Consult From: Rounding   Continue Visiting   (4-5-22)   Complexity of Encounter Low   Length of Encounter 15 minutes   Spiritual/Advent   Type Ritual   Intervention Anointing   Sacraments   Sacrament of Sick-Anointing Anointed  (Fr Rodriguez 4-5-22)

## 2022-04-05 NOTE — PROGRESS NOTES
Physical Therapy    Facility/Department: [facility] PROGRESSIVE CARE  Initial Assessment    NAME: Lorin Parker  : 1955  MRN: [id number]    Date of Service: 2022    Discharge Recommendations:Home with assist PRN      PT Equipment Recommendations  Equipment Needed: No    Assessment   Assessment: No PT goals identified. Pt is functionally safe at this time. Decision Making: Low Complexity  History: cervical fusion  Exam: none  Clinical Presentation: stable  REQUIRES PT FOLLOW UP: No  Activity Tolerance  Activity Tolerance: Patient Tolerated treatment well       Patient Diagnosis(es): The primary encounter diagnosis was Hypokalemia. A diagnosis of Acute psychosis ([facility] [address] was also pertinent to this visit. has a past medical history of Abdominal pain, Arthritis, Asthma, Benign hypertension with CKD (chronic kidney disease) stage III ([facility] [address] CKD (chronic kidney disease) stage 3, GFR 30-59 ml/min ([facility]), GERD (gastroesophageal reflux disease), Hyperlipidemia, Hypothyroidism, MVP (mitral valve prolapse), Rectal bleeding, Scarlet fever, and Sjogren - Ragini's syndrome. has a past surgical history that includes Hysterectomy; Neck surgery; Spine surgery; Colonoscopy (16 ); Upper gastrointestinal endoscopy (); Elbow surgery; Rectal prolapse repair; Eye surgery; knee surgery; knee surgery; Tonsillectomy; Bladder surgery; shoulder surgery; other surgical history; Tubal ligation; Breast surgery (Left); Dental surgery; Appendectomy; and Hammer toe surgery (Left, 2020).     Restrictions  Restrictions/Precautions  Restrictions/Precautions: Fall Risk  Required Braces or Orthoses?: Yes (cervical collar 22 sx - only for distances now)  Implants present? : Metal implants (cervical surgery 22)  Required Braces or Orthoses  Cervical: c-collar (wears only when walking any distance)        Subjective  General  Patient assessed for rehabilitation services?: Yes  Family / Caregiver Present: Yes  Follows Commands: Within Functional Limits  General Comment  Comments: pt had cervical fusion 1-18-22- needs collar when ambulating in [place]  Subjective  Subjective: pt has hard time staying focused and is easily distracted  Pain Screening  Patient Currently in Pain: Denies  Vital Signs  Patient Currently in Pain: Denies       Orientation  Orientation  Overall Orientation Status: Within Functional Limits  Social/Functional History  Social/Functional History  Lives With: Spouse (4 cats)  Type of Home: House  Home Layout: One level  Home Access: Stairs to enter without rails  Entrance Stairs - Number of Steps: Garage entrance - 2 steps with doorway to hold onto  Bathroom Shower/Tub: Walk-in shower,Shower chair without back  [employer]: Standard  Bathroom Equipment: Shower chair  Home Equipment: Rolling walker,Cane  ADL Assistance: [address] [place] [address]: Independent  Homemaking Responsibilities: Yes  Ambulation Assistance: Independent (use of walker after cervical surgery in January)  Transfer Assistance: Independent  Active : Yes  Mode of Transportation: Car  Occupation: Retired  Additional Comments: Patient's spouse is retired as well. Patient's daughter, son-in-law and grandchildren had been living with them recently.        Objective          AROM RLE (degrees)  RLE AROM: WNL  AROM LLE (degrees)  LLE AROM : WNL  Strength RLE  Strength RLE: WNL  Strength LLE  Strength LLE: WNL        Bed mobility  Supine to Sit: Supervision  Sit to Supine: Supervision  Scooting: Supervision  Transfers  Sit to Stand: Supervision  Stand to sit: Supervision  Ambulation  Ambulation?: Yes  Ambulation 1  Surface: level tile  Device: No Device  Assistance: Supervision  Distance: 100ft  Stairs/Curb  Stairs?: No     Balance  Sitting - Static: Good  Sitting - Dynamic: Good  Standing - Static: Good  Standing - Dynamic: Good        Plan   Plan  Plan Comment: no skilled PT needed at this time  Safety Devices  Type of devices: Call light within reach,Left in bed      Therapy Time   Individual Concurrent Group Co-treatment   [facility]         Time Out 1418         Minutes [address] [facility] Katelynn Laboy
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
## 2022-04-05 NOTE — PROGRESS NOTES
Speech Language Pathology  Facility/Department: [address] [place] [address]   CLINICAL BEDSIDE SWALLOW EVALUATION    NAME: Bony Santa  : 1955  MRN: [id number]    ADMISSION DATE: 2022  ADMITTING DIAGNOSIS: has Menopause; Rectal bleeding; Abdominal pain; Gastroesophageal reflux disease without esophagitis; Lower abdominal pain; Constipation; Rectal prolapse; Benign hypertension with CKD (chronic kidney disease) stage III (HCC); CKD (chronic kidney disease) stage 3, GFR 30-59 ml/min ([address] Hammer toe of left foot; Pain in toe of left foot; Bone cyst of foot; Digital mucinous cyst of toe of left foot; AMS (altered mental status); Hypokalemia; and Hypothyroidism on their problem list.      Recent Chest Xray/CT of Chest:    n/a    Date of Eval: 2022  Evaluating Therapist: TATYANA Shi    Current Diet level:  Current Diet : Regular  Current Liquid Diet : Thin      Primary Complaint   Pt. Wears cervical collar for recent cervical fusion. Pain:   Pt. Denies, per pt., \"as long as Im not moving\"    Reason for Referral  Bony Santa was referred for a bedside swallow evaluation to assess the efficiency of her swallow function, identify signs and symptoms of aspiration and make recommendations regarding safe dietary consistencies, effective compensatory strategies, and safe eating environment. Impression  Dysphagia Diagnosis: Swallow function appears grossly intact  Dysphagia Impression : Recommend easy to chew diet and thin liquids via cup (no straws). Dysphagia Outcome Severity Scale: Level 5: Mild dysphagia- Distant supervision. May need one diet consistency restricted     Treatment Plan  Requires SLP Intervention: Yes             Recommended Diet and Intervention  Diet Solids Recommendation: Easy to Chew  Liquid Consistency Recommendation:  Thin    (NO STRAWS)        Therapeutic Interventions: Diet tolerance monitoring    Compensatory Swallowing Strategies  Compensatory Swallowing Strategies: Small bites/sips;Eat/Feed slowly;Upright as possible for all oral intake; No straws    Treatment/Goals  Dysphagia Goals: The patient will tolerate recommended diet without observed clinical signs of aspiration    General  Behavior/Cognition: Alert; Cooperative  Respiratory Status: Room air  Breath Sounds: Clear  O2 Device: None (Room air)  Communication Observation: Functional  Follows Directions: Complex  Dentition: Edentulous  Patient Positioning: Upright in bed  Baseline Vocal Quality: Normal  Consistencies Administered: Reg solid;Pudding - teaspoon; Thin - cup; Thin - straw           Vision/Hearing  Vision  Vision: Within Functional Limits  Hearing  Hearing: Within functional limits    Oral Motor Deficits  Oral/Motor  Oral Motor: Within functional limits    Oral Phase Dysfunction  Oral Phase  Oral Phase: Exceptions  Oral Phase  Oral Phase - Comment: Pt. is endentulous, demonstrated prolonged mastication of dry solid (nic cracker)     Indicators of Pharyngeal Phase Dysfunction   Pharyngeal Phase  Pharyngeal Phase: Exceptions  Pharyngeal Phase   Pharyngeal: Overt s/s aspiration demonstrated (cough- inconsistent) on thin liquids via straw. No overt s/s aspiration noted with cup drinking. Education  Patient Education Response: Verbalizes understanding             Therapy Time  SLP Individual Minutes  Time In: 6675  Time Out: 4199  Minutes: [address] [facility] [address] M. A.CCC/SLP    4/5/2022 8:51 AM

## 2022-04-05 NOTE — ACP (ADVANCE CARE PLANNING)
Advance Care Planning     Advance Care Planning Activator (Inpatient)  Conversation Note      Date of ACP Conversation: 4/5/2022     Conversation Conducted with: Patient with Decision Making Capacity    ACP Activator: Rocco Knowles RN    Health Care Decision Maker:     Current Designated Health Care Decision Maker:     Click here to complete Healthcare Decision Makers including section of the Healthcare Decision Maker Relationship (ie \"Primary\")  Today we documented Decision Maker(s) consistent with Legal Next of Kin hierarchy. Care Preferences    Ventilation: \"If you were in your present state of health and suddenly became very ill and were unable to breathe on your own, what would your preference be about the use of a ventilator (breathing machine) if it were available to you? \"      Would the patient desire the use of ventilator (breathing machine)?: yes    \"If your health worsens and it becomes clear that your chance of recovery is unlikely, what would your preference be about the use of a ventilator (breathing machine) if it were available to you? \"     Would the patient desire the use of ventilator (breathing machine)?: Yes      Resuscitation  \"CPR works best to restart the heart when there is a sudden event, like a heart attack, in someone who is otherwise healthy. Unfortunately, CPR does not typically restart the heart for people who have serious health conditions or who are very sick. \"    \"In the event your heart stopped as a result of an underlying serious health condition, would you want attempts to be made to restart your heart (answer \"yes\" for attempt to resuscitate) or would you prefer a natural death (answer \"no\" for do not attempt to resuscitate)? \" yes       [x] Yes   [] No   Educated Patient / Zeus Sebastiáns regarding differences between Advance Directives and portable DNR orders.     Length of ACP Conversation in minutes:      Conversation Outcomes:  [x] ACP discussion completed  [] Existing advance directive reviewed with patient; no changes to patient's previously recorded wishes  [] New Advance Directive completed  [] Portable Do Not Rescitate prepared for Provider review and signature  [] POLST/POST/MOLST/MOST prepared for Provider review and signature      Follow-up plan:    [] Schedule follow-up conversation to continue planning  [] Referred individual to Provider for additional questions/concerns   [] Advised patient/agent/surrogate to review completed ACP document and update if needed with changes in condition, patient preferences or care setting    [] This note routed to one or more involved healthcare providers    Electronically signed by Lauren Ortez RN on 4/5/2022 at 1:14 PM

## 2022-04-05 NOTE — PROGRESS NOTES
[facility]: SAMANTHA MCNEAL   Occupational Therapy Evaluation  Date: 22  Patient Name: Jobie Spatz       Room:   MRN: [id number]  Account: [de-identified]   : 1955  (73 y.o.) Gender: female     Discharge Recommendations:  Further Occupational Therapy is recommended upon facility discharge. Referring Practitioner: Antelmo Quintero MD  Diagnosis: Altered mental status       Treatment Diagnosis: Impaired self-care status. Past Medical History:  has a past medical history of Abdominal pain, Arthritis, Asthma, Benign hypertension with CKD (chronic kidney disease) stage III ([facility] [address] CKD (chronic kidney disease) stage 3, GFR 30-59 ml/min ([facility]), GERD (gastroesophageal reflux disease), Hyperlipidemia, Hypothyroidism, MVP (mitral valve prolapse), Rectal bleeding, Scarlet fever, and Sjogren - Ragini's syndrome. Past Surgical History:   has a past surgical history that includes Hysterectomy; Neck surgery; Spine surgery; Colonoscopy (16 ); Upper gastrointestinal endoscopy (); Elbow surgery; Rectal prolapse repair; Eye surgery; knee surgery; knee surgery; Tonsillectomy; Bladder surgery; shoulder surgery; other surgical history; Tubal ligation; Breast surgery (Left); Dental surgery; Appendectomy; and Hammer toe surgery (Left, 2020). Restrictions  Restrictions/Precautions: Fall Risk  Implants present? : Metal implants (cervical surgery 22)  Required Braces or Orthoses?: Yes (cervical collar 22 sx - only for distances now)     Vitals  Temp: 98.6 °F (37 °C)  Pulse: 88  Resp: 16  BP: 133/62  Height: 5' (152.4 cm)  Weight: 122 lb 2.2 oz (55.4 kg)  BMI (Calculated): 23.9  Oxygen Therapy  SpO2: 96 %  Pulse Oximeter Device Mode: Intermittent  Pulse Oximeter Device Location: Finger  O2 Device: None (Room air)  Level of Consciousness: Alert (0)    Subjective  Subjective:  \"This door was a whole lot quicker than me and [facility]" patient describes how she fell coming into the house carrying groceries recently. Vision  Vision: Impaired  Vision Exceptions: Wears glasses at all times  Hearing  Hearing: Within functional limits  Social/Functional History  Lives With: Spouse (4 cats)  Type of Home: House  Home Layout: One level  Home Access: Stairs to enter without rails  Entrance Stairs - Number of Steps: Garage entrance - 2 steps with doorway to hold onto  [employer] Shower/Tub: Walk-in shower,Shower chair without back  [employer]: Standard  Bathroom Equipment: Shower chair  Home Equipment: Rolling walker,Cane  ADL Assistance: [address]: Independent  Homemaking Responsibilities: Yes  Ambulation Assistance: Independent (use of walker after cervical surgery in January)  Transfer Assistance: Independent  Active : Yes  Mode of Transportation: Car  Occupation: Retired  Additional Comments: Patient's spouse is retired as well. Patient's daughter, son-in-law and grandchildren had been living with them recently. Objective  Vision - Basic Assessment  Prior Vision: Wears glasses all the time   Cognition  Overall Cognitive Status: Impaired  Arousal/Alertness: Delayed responses to stimuli  Attention Span: Attends with cues to redirect  Memory: Decreased short term memory,Decreased recall of recent events  Following Commands:  Follows multistep commands with repetition  Safety Judgement: Decreased awareness of need for assistance  Awareness of Errors: Assistance required to identify errors made  Insights: Decreased awareness of deficits  Sequencing and Organization: Assistance required to identify errors made   Perception  Overall Perceptual Status: WFL  Sensation  Overall Sensation Status: Impaired (reports numbness in cervical incision)   ADL  Feeding: Setup  Grooming: Contact guard assistance (washing hands at sink)  UE Bathing: Contact guard assistance  LE Bathing: Contact guard assistance  UE Dressing: Contact guard assistance  LE Dressing: Contact guard assistance  Toileting: Contact guard assistance  Additional Comments: OT facilitated patient engagement in toileting tasks this date with CGA for safety. Patient doffed/donned bilateral socks with SBA while seated EOB. ADL scores based on skilled observations and clinical reasoning unless otherwise noted. UE Function           LUE Strength  Gross LUE Strength: WFL  L Hand General: 4/5  LUE Strength Comment: Did not formally assess shoulder/elbow due to cervical surgery     LUE Tone: Normotonic     LUE AROM (degrees)  LUE AROM : WFL     Left Hand AROM (degrees)  Left Hand AROM: WFL  RUE Strength  Gross RUE Strength: WFL  R Hand General: 4/5  RUE Strength Comment: Did not formally assess shoulder/elbow due to cervical surgery      RUE Tone: Normotonic     RUE AROM (degrees)  RUE AROM : WFL     Right Hand AROM (degrees)  Right Hand AROM: WFL    Fine Motor Skills  Coordination  Movements Are Fluid And Coordinated: Yes                           Mobility  Supine to Sit: Supervision  Sit to Supine: Supervision     Balance  Sitting Balance: Stand by assistance  Standing Balance: Contact guard assistance     Functional Mobility  Functional - Mobility Device: No device  Activity: To/from bathroom,Other  Assist Level: Contact guard assistance  Functional Mobility Comments: with no device  Bed mobility  Supine to Sit: Supervision  Sit to Supine: Supervision  Scooting: Supervision     Transfers  Sit to stand: Contact guard assistance  Stand to sit: Contact guard assistance  Functional Activity Tolerance  Functional Activity Tolerance: Tolerates 30 min exercise with multiple rests     Assessment  Assessment  Performance deficits / Impairments: Decreased functional mobility ,Decreased ADL status,Decreased strength,Decreased safe awareness,Decreased endurance,Decreased balance,Decreased cognition,Decreased high-level IADLs  Treatment Diagnosis: Impaired self-care status.   Prognosis: Good  Decision Making: Medium Complexity  REQUIRES OT FOLLOW UP: Yes  Discharge Recommendations: Patient would benefit from continued therapy after discharge  Activity Tolerance: Patient Tolerated treatment well         Functional Outcome Measures  AM-PAC Daily Activity Inpatient   How much help for putting on and taking off regular lower body clothing?: A Little  How much help for Bathing?: A Little  How much help for Toileting?: A Little  How much help for putting on and taking off regular upper body clothing?: A Little  How much help for taking care of personal grooming?: A Little  How much help for eating meals?: A Little  AM-[facility] Inpatient Daily Activity Raw Score: 18  AM-PAC Inpatient ADL T-Scale Score : 38.66  ADL Inpatient CMS 0-100% Score: 46.65  ADL Inpatient CMS G-Code Modifier : CK       Goals  Patient Goals   Patient goals : To feel better  Short term goals  Time Frame for Short term goals: By discharge  Short term goal 1: Patient will perform BADLs with Supervision and Good safety. Short term goal 2: Patient will perform functional mobility/transfers during self-care with Supervision, RW, and Good safety. Short term goal 3: Patient will actively participate in 15-25 minutes of therapeutic exercise/functional activities to promote increased IND with self-care and mobility. Plan  Safety Devices  Safety Devices in place: Yes  Type of devices:  All fall risk precautions in place,Bed alarm in place,Chair alarm in place,Gait belt,Patient at risk for falls,Left in bed,Nurse notified (spouse present)     Plan  Times per week: 3-5  Times per day: Daily  Current Treatment Recommendations: Self-Care / ADL,Home Management Training,Strengthening,Balance Training,Functional Mobility Training,Endurance Training,Patient/Caregiver Education & Training,Safety Education & Training,Equipment Evaluation, Education, & procurement          OT Individual Minutes  Time In: 9352  Time Out: 1054  Minutes: 39    Electronically signed by Mona Carrel, OT on 4/5/22 at 3:49 PM EDT

## 2022-04-05 NOTE — ED NOTES
Admission Dx:  Altered mental status    Pts Chief Complaints on Arrival: altered mental status    ADL's - Partial assistance    Pending Diagnostics:  n/a    Residence PTA: single story home    Special Considerations/Circumstances:  n/a    Vitals: Current vital signs:  BP (!) 125/56   Pulse 88   Temp 98.8 °F (37.1 °C) (Oral)   Resp 16   Ht 5' (1.524 m)   Wt 120 lb (54.4 kg)   SpO2 97%   BMI 23.44 kg/m²                MEWS Score: 9840 Howie Davey RN  04/05/22 [id number]
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
## 2022-04-05 NOTE — PROGRESS NOTES
Comprehensive Nutrition Assessment    Type and Reason for Visit:  Positive Nutrition Screen (no dentures, wt loss)    Nutrition Recommendations/Plan:   Will continue Regular diet. [facility] have been made aware pt has no teeth. Will add Ensure Enlive once daily. Nutrition Assessment:  Pt admitted due to AMS. She does not have her dentures here but was able to consume more than 50% of food provided at breakfast and lunch. Unable to verify wt loss due to wt history is stated. Malnutrition Assessment:  Malnutrition Status: At risk for malnutrition (Comment)    Context:  Acute Illness     Findings of the 6 clinical characteristics of malnutrition:  Energy Intake:  Mild decrease in energy intake (Comment)  Weight Loss:  Unable to assess     Body Fat Loss:  Unable to assess     Muscle Mass Loss:  Unable to assess    Fluid Accumulation:  No significant fluid accumulation     Strength:  Not Performed    Estimated Daily Nutrient Needs:  Energy (kcal): [place] x 1.2= 1200 kcal; Weight Used for Energy Requirements:  Admission     Protein (g):  1.3g/kg= 70 g; Weight Used for Protein Requirements:  Admission          Nutrition Related Findings:  no edema, Labs: Reviewed, Meds: Synthroid, PMH: CKD      Wounds:  None       Current Nutrition Therapies:    ADULT DIET; Regular    Anthropometric Measures:  · Height: 5' (152.4 cm)  · Current Body Weight: 122 lb (55.3 kg)   · Admission Body Weight: 122 lb (55.3 kg)    · Usual Body Weight: 130 lb (59 kg) (stated)     · Ideal Body Weight: 100 lbs; BMI: 23.8  · BMI Categories: Normal Weight (BMI 18.5-24. 9)       Nutrition Diagnosis:   · Predicted inadequate energy intake related to  (current medical condition) as evidenced by weight loss    Nutrition Interventions:   Food and/or Nutrient Delivery:  Continue Current Diet,Start Oral Nutrition Supplement  Nutrition Education/Counseling:  No recommendation at this time   Coordination of Nutrition Care:  Continue to Instructed to call immediately if any ocular problems or concerns. monitor while inpatient    Goals:  po intake greater than 50%       Nutrition Monitoring and Evaluation:   Food/Nutrient Intake Outcomes:  Food and Nutrient Intake,Supplement Intake  Physical Signs/Symptoms Outcomes:  Biochemical Data,Chewing or Swallowing,GI Status,Skin,Fluid Status or Edema,Weight     Discharge Planning:    Continue current diet     Electronically signed by Matthew Cartagena RD, LD on 4/5/22 at 3:27 PM EDT    Contact: [phone]
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
## 2022-04-05 NOTE — PLAN OF CARE
Nutrition Problem #1: Predicted inadequate energy intake  Intervention: Food and/or Nutrient Delivery: Continue Current Diet,Start Oral Nutrition Supplement  Nutritional Goals: po intake greater than 50%

## 2022-04-06 ENCOUNTER — APPOINTMENT (OUTPATIENT)
Dept: GENERAL RADIOLOGY | Age: 67
End: 2022-04-06
Payer: MEDICARE

## 2022-04-06 PROBLEM — D50.9 IRON DEFICIENCY ANEMIA: Status: ACTIVE | Noted: 2022-04-06

## 2022-04-06 LAB
ABSOLUTE EOS #: 0.12 K/UL (ref 0–0.4)
ABSOLUTE LYMPH #: 1.05 K/UL (ref 1–4.8)
ABSOLUTE MONO #: 0.36 K/UL (ref 0.1–1.3)
ALBUMIN SERPL-MCNC: 3.4 G/DL (ref 3.5–5.2)
ALP BLD-CCNC: 83 U/L (ref 35–104)
ALT SERPL-CCNC: 18 U/L (ref 5–33)
ANION GAP SERPL CALCULATED.3IONS-SCNC: 10 MMOL/L (ref 9–17)
AST SERPL-CCNC: 33 U/L
BASOPHILS # BLD: 2 % (ref 0–2)
BASOPHILS ABSOLUTE: 0.06 K/UL (ref 0–0.2)
BILIRUB SERPL-MCNC: 0.23 MG/DL (ref 0.3–1.2)
BUN BLDV-MCNC: 15 MG/DL (ref 8–23)
CALCIUM SERPL-MCNC: 9.1 MG/DL (ref 8.6–10.4)
CHLORIDE BLD-SCNC: 108 MMOL/L (ref 98–107)
CO2: 24 MMOL/L (ref 20–31)
CREAT SERPL-MCNC: 0.91 MG/DL (ref 0.5–0.9)
EOSINOPHILS RELATIVE PERCENT: 4 % (ref 0–4)
GFR AFRICAN AMERICAN: >60 ML/MIN
GFR NON-AFRICAN AMERICAN: >60 ML/MIN
GFR SERPL CREATININE-BSD FRML MDRD: ABNORMAL ML/MIN/{1.73_M2}
GLUCOSE BLD-MCNC: 113 MG/DL (ref 70–99)
HCT VFR BLD CALC: 32.6 % (ref 36–46)
HEMOGLOBIN: 10.8 G/DL (ref 12–16)
IRON SATURATION: 9 % (ref 20–55)
IRON: 31 UG/DL (ref 37–145)
LYMPHOCYTES # BLD: 35 % (ref 24–44)
MAGNESIUM: 1.8 MG/DL (ref 1.6–2.6)
MCH RBC QN AUTO: 27.2 PG (ref 26–34)
MCHC RBC AUTO-ENTMCNC: 33 G/DL (ref 31–37)
MCV RBC AUTO: 82.5 FL (ref 80–100)
MONOCYTES # BLD: 12 % (ref 1–7)
MORPHOLOGY: ABNORMAL
PDW BLD-RTO: 13.6 % (ref 11.5–14.9)
PLATELET # BLD: 305 K/UL (ref 150–450)
PMV BLD AUTO: 7.3 FL (ref 6–12)
POTASSIUM SERPL-SCNC: 3.5 MMOL/L (ref 3.7–5.3)
RBC # BLD: 3.96 M/UL (ref 4–5.2)
SEG NEUTROPHILS: 47 % (ref 36–66)
SEGMENTED NEUTROPHILS ABSOLUTE COUNT: 1.41 K/UL (ref 1.3–9.1)
SODIUM BLD-SCNC: 142 MMOL/L (ref 135–144)
TOTAL IRON BINDING CAPACITY: 341 UG/DL (ref 250–450)
TOTAL PROTEIN: 6.1 G/DL (ref 6.4–8.3)
UNSATURATED IRON BINDING CAPACITY: 310 UG/DL (ref 112–347)
WBC # BLD: 3 K/UL (ref 3.5–11)

## 2022-04-06 PROCEDURE — 92611 MOTION FLUOROSCOPY/SWALLOW: CPT

## 2022-04-06 PROCEDURE — G0378 HOSPITAL OBSERVATION PER HR: HCPCS

## 2022-04-06 PROCEDURE — 74230 X-RAY XM SWLNG FUNCJ C+: CPT

## 2022-04-06 PROCEDURE — 71045 X-RAY EXAM CHEST 1 VIEW: CPT

## 2022-04-06 PROCEDURE — 85025 COMPLETE CBC W/AUTO DIFF WBC: CPT

## 2022-04-06 PROCEDURE — 80053 COMPREHEN METABOLIC PANEL: CPT

## 2022-04-06 PROCEDURE — 92526 ORAL FUNCTION THERAPY: CPT

## 2022-04-06 PROCEDURE — 6370000000 HC RX 637 (ALT 250 FOR IP): Performed by: FAMILY MEDICINE

## 2022-04-06 PROCEDURE — 2580000003 HC RX 258: Performed by: FAMILY MEDICINE

## 2022-04-06 PROCEDURE — 83550 IRON BINDING TEST: CPT

## 2022-04-06 PROCEDURE — 83735 ASSAY OF MAGNESIUM: CPT

## 2022-04-06 PROCEDURE — 99232 SBSQ HOSP IP/OBS MODERATE 35: CPT | Performed by: PSYCHIATRY & NEUROLOGY

## 2022-04-06 PROCEDURE — 83540 ASSAY OF IRON: CPT

## 2022-04-06 PROCEDURE — 36415 COLL VENOUS BLD VENIPUNCTURE: CPT

## 2022-04-06 RX ORDER — FERROUS SULFATE 325(65) MG
325 TABLET ORAL
Status: DISCONTINUED | OUTPATIENT
Start: 2022-04-07 | End: 2022-04-07 | Stop reason: HOSPADM

## 2022-04-06 RX ADMIN — SODIUM CHLORIDE, PRESERVATIVE FREE 10 ML: 5 INJECTION INTRAVENOUS at 11:35

## 2022-04-06 RX ADMIN — SODIUM CHLORIDE, PRESERVATIVE FREE 10 ML: 5 INJECTION INTRAVENOUS at 22:25

## 2022-04-06 RX ADMIN — POTASSIUM CHLORIDE 20 MEQ: 20 TABLET, EXTENDED RELEASE ORAL at 18:00

## 2022-04-06 RX ADMIN — LEVOTHYROXINE SODIUM 100 MCG: 0.1 TABLET ORAL at 06:07

## 2022-04-06 ASSESSMENT — PAIN SCALES - GENERAL
PAINLEVEL_OUTOF10: 0
PAINLEVEL_OUTOF10: 3
PAINLEVEL_OUTOF10: 0

## 2022-04-06 ASSESSMENT — ENCOUNTER SYMPTOMS
EYE ITCHING: 0
VOMITING: 0
EYE REDNESS: 0
NAUSEA: 0
TROUBLE SWALLOWING: 0
CHEST TIGHTNESS: 0
SHORTNESS OF BREATH: 0
COUGH: 0
ABDOMINAL PAIN: 0
BLOOD IN STOOL: 0
COLOR CHANGE: 0

## 2022-04-06 NOTE — PROGRESS NOTES
Progress Note    4/6/2022   2:27 PM    Name:  Clover Hardwick  MRN:    [id number]     Acct:     [de-identified]   Room:  2115/2115-01  IP Day: 0     Admit Date: 4/4/2022  8:42 PM  PCP: Sophia Diaz MD    Subjective:     C/C:   Chief Complaint   Patient presents with   [facility] Altered Mental Status    Hallucinations    Mental Health Problem       Interval History: Status: not changed. Patient is alert oriented. There have been no reports of agitation. Patient complains of left rib cage pain exacerbated by deep breathing associated with cough. Vital signs reviewed and stable. Recent labs reviewed potassium 3.5, urine tox is negative hemoglobin 10.8. Folate and vitamin B12 level is within normal limits    ROS:   all 10 systems reviewed and are negative except as noted    Review of Systems   Constitutional: Negative for chills and fatigue. HENT: Negative for drooling, mouth sores, sneezing and trouble swallowing. Eyes: Negative for redness and itching. Respiratory: Negative for cough, chest tightness and shortness of breath. Cardiovascular: Negative for chest pain, palpitations and leg swelling. Gastrointestinal: Negative for abdominal pain, blood in stool, nausea and vomiting. Endocrine: Negative for heat intolerance and polyphagia. Genitourinary: Negative for difficulty urinating, flank pain and pelvic pain. Musculoskeletal: Negative for arthralgias, joint swelling and neck stiffness. Skin: Negative for color change and pallor. Allergic/Immunologic: Negative for food allergies. Neurological: Negative for dizziness, seizures and headaches. Hematological: Does not bruise/bleed easily. Psychiatric/Behavioral: Negative for agitation, behavioral problems and suicidal ideas. The patient is not hyperactive. Medications: Allergies:    Allergies   Allergen Reactions    Latex     Nickel     Benadryl [Diphenhydramine]     Cefadroxil     Cephalexin Itching    Cyclosporine Other (See Comments)     burning    Demerol Hcl [Meperidine]     Erythromycin      Other reaction(s): Unknown (qualifier value)    Lansoprazole      Other reaction(s): Unknown (qualifier value)    Lomefloxacin      Other reaction(s): Unknown (qualifier value)    Loracarbef      Other reaction(s): Unknown (qualifier value)    Meperidine Hcl      Other reaction(s): Hallucinations    Other Other (See Comments) and Itching    Percocet [Oxycodone-Acetaminophen]     Pneumococcal Vaccines Other (See Comments)    Seroquel [Quetiapine Fumarate]     Adhesive Tape Rash       Current Meds: [START ON 4/7/2022] ferrous sulfate (IRON 325) tablet 325 mg, Daily with breakfast  potassium chloride (KLOR-CON M) extended release tablet 20 mEq, BID WC  magnesium oxide (MAG-OX) tablet 400 mg, Daily  levothyroxine (SYNTHROID) tablet 100 mcg, Daily  ARIPiprazole (ABILIFY) tablet 5 mg, Daily  vilazodone HCl (VIIBRYD) TABS 40 mg, Daily  zolpidem (AMBIEN) tablet 5 mg, Nightly PRN  albuterol sulfate  (90 Base) MCG/ACT inhaler 2 puff, Q4H PRN  atorvastatin (LIPITOR) tablet 40 mg, Daily  sodium chloride flush 0.9 % injection 10 mL, 2 times per day  sodium chloride flush 0.9 % injection 10 mL, PRN  0.9 % sodium chloride infusion, PRN  potassium chloride (KLOR-CON M) extended release tablet 40 mEq, PRN   Or  potassium bicarb-citric acid (EFFER-K) effervescent tablet 40 mEq, PRN   Or  potassium chloride 10 mEq/100 mL IVPB (Peripheral Line), PRN  magnesium sulfate 1000 mg in dextrose 5% 100 mL IVPB, PRN  enoxaparin (LOVENOX) injection 40 mg, Daily  ondansetron (ZOFRAN-ODT) disintegrating tablet 4 mg, Q8H PRN   Or  ondansetron (ZOFRAN) injection 4 mg, Q6H PRN  magnesium hydroxide (MILK OF MAGNESIA) 400 MG/5ML suspension 30 mL, Daily PRN  acetaminophen (TYLENOL) tablet 650 mg, Q6H PRN   Or  acetaminophen (TYLENOL) suppository 650 mg, Q6H PRN  haloperidol (HALDOL) tablet 0.5 mg, Q6H PRN  HYDROcodone-acetaminophen (NORCO) 5-325 MG per tablet 1 tablet, Q6H PRN        Data:     Code Status:  Full Code    Family History   Problem Relation Age of Onset    Heart Disease Mother     Stroke Father        Social History     Socioeconomic History    Marital status:      Spouse name: Not on file    Number of children: Not on file    Years of education: Not on file    Highest education level: Not on file   Occupational History    Not on file   Tobacco Use    Smoking status: Never Smoker    Smokeless tobacco: Never Used   Vaping Use    Vaping Use: Never used   Substance and Sexual Activity    Alcohol use: No    Drug use: No    Sexual activity: Never   Other Topics Concern    Not on file   Social History Narrative    Not on file     Social Determinants of Health     Financial Resource Strain:     Difficulty of Paying Living Expenses: Not on file   Food Insecurity:     Worried About Running Out of Food in the Last Year: Not on file    [place] of Food in the Last Year: Not on file   Transportation Needs:     Lack of Transportation (Medical): Not on file    Lack of Transportation (Non-Medical):  Not on file   Physical Activity:     Days of Exercise per Week: Not on file    Minutes of Exercise per Session: Not on file   Stress:     Feeling of Stress : Not on file   Social Connections:     Frequency of Communication with Friends and Family: Not on file    Frequency of Social Gatherings with Friends and Family: Not on file    Attends Jain Services: Not on file    Active Member of Clubs or Organizations: Not on file    Attends Club or Organization Meetings: Not on file    Marital Status: Not on file   Intimate Partner Violence:     Fear of Current or Ex-Partner: Not on file    Emotionally Abused: Not on file    Physically Abused: Not on file    Sexually Abused: Not on file   Housing Stability:     Unable to Pay for Housing in the Last Year: Not on file    Number of [place] in the Last Year: Not on file    Unstable Housing in the Last Year: Not on file       I/O (24Hr): Intake/Output Summary (Last 24 hours) at 4/6/2022 1427  Last data filed at 4/6/2022 1346  Gross per 24 hour   Intake 620 ml   Output 1000 ml   Net -380 ml     Radiology:  No results found.     Labs:  Recent Results (from the past 24 hour(s))   Comprehensive Metabolic Panel w/ Reflex to MG    Collection Time: 04/06/22  5:24 AM   Result Value Ref Range    Glucose 113 (H) 70 - 99 mg/dL    BUN 15 8 - 23 mg/dL    CREATININE 0.91 (H) 0.50 - 0.90 mg/dL    Calcium 9.1 8.6 - 10.4 mg/dL    Sodium 142 135 - 144 mmol/L    Potassium 3.5 (L) 3.7 - 5.3 mmol/L    Chloride 108 (H) 98 - 107 mmol/L    CO2 24 20 - 31 mmol/L    Anion Gap 10 9 - 17 mmol/L    Alkaline Phosphatase 83 35 - 104 U/L    ALT 18 5 - 33 U/L    AST 33 (H) <32 U/L    Total Bilirubin 0.23 (L) 0.3 - 1.2 mg/dL    Total Protein 6.1 (L) 6.4 - 8.3 g/dL    Albumin 3.4 (L) 3.5 - 5.2 g/dL    GFR Non-African American >60 >60 mL/min    GFR African American >60 >60 mL/min    GFR Comment         CBC auto differential    Collection Time: 04/06/22  5:24 AM   Result Value Ref Range    WBC 3.0 (L) 3.5 - 11.0 k/uL    RBC 3.96 (L) 4.0 - 5.2 m/uL    Hemoglobin 10.8 (L) 12.0 - 16.0 g/dL    Hematocrit 32.6 (L) 36 - 46 %    MCV 82.5 80 - 100 fL    MCH 27.2 26 - 34 pg    MCHC 33.0 31 - 37 g/dL    RDW 13.6 11.5 - 14.9 %    Platelets 833 971 - 312 k/uL    MPV 7.3 6.0 - 12.0 fL    Seg Neutrophils 47 36 - 66 %    Lymphocytes 35 24 - 44 %    Monocytes 12 (H) 1 - 7 %    Eosinophils % 4 0 - 4 %    Basophils 2 0 - 2 %    Segs Absolute 1.41 1.3 - 9.1 k/uL    Absolute Lymph # 1.05 1.0 - 4.8 k/uL    Absolute Mono # 0.36 0.1 - 1.3 k/uL    Absolute Eos # 0.12 0.0 - 0.4 k/uL    Basophils Absolute 0.06 0.0 - 0.2 k/uL    Morphology ANISOCYTOSIS PRESENT    Magnesium    Collection Time: 04/06/22  5:24 AM   Result Value Ref Range    Magnesium 1.8 1.6 - 2.6 mg/dL       Physical Examination:        Vitals:  /65   Pulse 75   Temp 97.6 °F (36.4 °C) (Oral) Resp 18   Ht 5' (1.524 m)   Wt 119 lb 7.8 oz (54.2 kg)   SpO2 97%   BMI 23.34 kg/m²   Temp (24hrs), Av.9 °F (36.6 °C), Min:97.6 °F (36.4 °C), Max:98.3 °F (36.8 °C)    No results for input(s): POCGLU in the last 72 hours. Physical Exam  Vitals reviewed. HENT:      Head: Normocephalic. Right Ear: External ear normal.      Left Ear: External ear normal.      Nose: Nose normal.      Mouth/Throat:      Mouth: Mucous membranes are moist.      Pharynx: Oropharynx is clear. Eyes:      Conjunctiva/sclera: Conjunctivae normal.   Cardiovascular:      Rate and Rhythm: Normal rate and regular rhythm. Pulses: Normal pulses. Heart sounds: Normal heart sounds. Pulmonary:      Effort: Pulmonary effort is normal.      Breath sounds: Normal breath sounds. No rales. Abdominal:      General: Bowel sounds are normal.      Palpations: Abdomen is soft. Tenderness: There is no abdominal tenderness. There is no right CVA tenderness or left CVA tenderness. Musculoskeletal:         General: No deformity. Cervical back: Normal range of motion and neck supple. Right lower leg: No edema. Left lower leg: No edema. Skin:     General: Skin is warm. Capillary Refill: Capillary refill takes less than 2 seconds. Coloration: Skin is not jaundiced. Neurological:      General: No focal deficit present. Mental Status: She is alert. Mental status is at baseline. Psychiatric:         Mood and Affect: Mood normal.         Behavior: Behavior normal.         Assessment:        Primary Problem  AMS (altered mental status)     Principal Problem:    AMS (altered mental status)  Active Problems:    Hypokalemia    Hypothyroidism  Resolved Problems:    * No resolved hospital problems.  *      Past Medical History:   Diagnosis Date    Abdominal pain     Arthritis     Asthma     Benign hypertension with CKD (chronic kidney disease) stage III (HCC)     CKD (chronic kidney disease) stage 3, GFR 30-59 ml/min (HCC)     GERD (gastroesophageal reflux disease)     Hyperlipidemia     Hypothyroidism     MVP (mitral valve prolapse)     Rectal bleeding     Scarlet fever     Sjogren - Ragini's syndrome         Plan:        1. Haldol 0.5 mg p.o. every 6 hours as needed for agitation  2. DVT prophylaxis on ox 40 mg subQ daily  3. TSH 20.25, free T4 is 0.85. increase [id number] mg p.o. daily . 4.  ammonia level within normal limit. 5.  EKG shows normal sinus rhythm. 6. Check chest x-ray  7. KCl 20 MEQ twice daily. 8. Ferrous sulfate 325 mg p.o. daily  9. Check iron studies , FOBT  10.  CT brain report and CT cervical spine report from 04/04/2022 care everywhere reviewed and does not show any acute abnormality. 11.  Psychiatric but noted. 12. Bedside speech eval completed. Video swallow study ordered  13. CBC, CMP  1. EPCs  2. PT/OT to evaluate and treat  3. Pain control  4. Replace electrolytes as per sliding scale  5. Home medications reviewed and appropriate medications continued  6.  Reviewed labs and imaging studies from last 24 hours and results explained to patient      Electronically signed by Sukumar Hernandez MD
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
## 2022-04-06 NOTE — PROGRESS NOTES
Speech Language Pathology  Speech Language Pathology  [facility] Acute Rehab Unit at [facility]    Dysphagia Treatment Note    Date: 4/6/2022  Patients Name: Zhang Emmanuel  MRN: [id number]  Diagnosis:    Patient Active Problem List   Diagnosis Code    Menopause Z78.0    Rectal bleeding K62.5    Abdominal pain R10.9    Gastroesophageal reflux disease without esophagitis K21.9    Lower abdominal pain R10.30    Constipation K59.00    Rectal prolapse K62.3    Benign hypertension with CKD (chronic kidney disease) stage III ([facility]) I12.9, N18.30    CKD (chronic kidney disease) stage 3, GFR 30-59 ml/min ([facility]) N18.30    Hammer toe of left foot M20.42    Pain in toe of left foot M79.675    Bone cyst of foot M85.679    Digital mucinous cyst of toe of left foot M67.472    AMS (altered mental status) R41.82    Hypokalemia E87.6    Hypothyroidism E03.9       Pain: pt. denies    Dysphagia Treatment  Treatment time:   2396-3647    Subjective: [x] Alert [x] Cooperative     [] Confused     [] Agitated    [] Lethargic    Objective/Assessment:    Pt. Seen for diet tolerance monitoring while eating breakfast.  Pt. Demo. Overt s/s aspiration (throat clear) despite drinking thin liquids via cup (pt. Reports she avoids straws even though they continue to come up on her tray). Updated pt. And RN that video swallow study is recommended. ST to coordinate with radiology when order is received. Plan:  [x] Continue ST services    [] Discharge from ST:        Discharge recommendations: []  Further therapy recommended at discharge. The patient should be able to tolerate at least 3 hours of therapy per day over 5 days or 15 hours over 7 days. [] Further therapy recommended at discharge. [] No therapy recommended at discharge. Treatment completed by: Estelle Mcdaniel. MUKUND/SLP

## 2022-04-06 NOTE — CARE COORDINATION
ONGOING DISCHARGE PLAN:    Patient is alert and oriented x4. Spoke with patient regarding discharge plan and patient confirms that plan is still to discharge to home with [facility]  Patient will have a Barium Swallow study done today   Psych consult ordered    Will continue to follow for additional discharge needs.     Electronically signed by Brenda Black RN on 4/6/2022 at 1:47 PM

## 2022-04-06 NOTE — PLAN OF CARE
Problem: Falls - Risk of:  Goal: Absence of physical injury  Description: Absence of physical injury  Outcome: Ongoing  Note: No falls noted this shift. Patient ambulates with x1 staff assistance without difficulty. Bed kept in low position. Safe environment maintained. Bedside table & call light in reach. Uses call light appropriately when needing assistance. Problem: Infection:  Goal: Will remain free from infection  Description: Will remain free from infection  Outcome: Ongoing  Note: Patient remains afebrile;  no signs of erythema, edema, or warmth. Will continue to monitor for signs/symptoms of infection. Problem: Pain:  Goal: Patient's pain/discomfort is manageable  Description: Patient's pain/discomfort is manageable  Outcome: Ongoing  Note: No pain at this time. 0/10 pain scale.        Problem: Discharge Planning:  Goal: Patients continuum of care needs are met  Description: Patients continuum of care needs are met  Outcome: Ongoing

## 2022-04-06 NOTE — FLOWSHEET NOTE
04/06/22 1757   Treatment Team Notification   Reason for Communication Review case   Team Member Name Dr Tiffany Rodríguez Provider   Method of Communication Call   Response Waiting for response   Primary care would life to know if the plan for this patient is to be admitted to [facility].

## 2022-04-07 VITALS
HEIGHT: 60 IN | TEMPERATURE: 98.6 F | BODY MASS INDEX: 23.36 KG/M2 | HEART RATE: 84 BPM | DIASTOLIC BLOOD PRESSURE: 76 MMHG | WEIGHT: 119 LBS | SYSTOLIC BLOOD PRESSURE: 143 MMHG | OXYGEN SATURATION: 99 % | RESPIRATION RATE: 16 BRPM

## 2022-04-07 PROBLEM — N30.00 ACUTE CYSTITIS WITHOUT HEMATURIA: Status: ACTIVE | Noted: 2022-04-07

## 2022-04-07 LAB
ABSOLUTE EOS #: 0.15 K/UL (ref 0–0.4)
ABSOLUTE LYMPH #: 1.63 K/UL (ref 1–4.8)
ABSOLUTE MONO #: 0.23 K/UL (ref 0.1–1.3)
ALBUMIN SERPL-MCNC: 3.6 G/DL (ref 3.5–5.2)
ALP BLD-CCNC: 82 U/L (ref 35–104)
ALT SERPL-CCNC: 15 U/L (ref 5–33)
ANION GAP SERPL CALCULATED.3IONS-SCNC: 11 MMOL/L (ref 9–17)
AST SERPL-CCNC: 24 U/L
BASOPHILS # BLD: 0 % (ref 0–2)
BASOPHILS ABSOLUTE: 0 K/UL (ref 0–0.2)
BILIRUB SERPL-MCNC: 0.22 MG/DL (ref 0.3–1.2)
BUN BLDV-MCNC: 17 MG/DL (ref 8–23)
CALCIUM SERPL-MCNC: 9 MG/DL (ref 8.6–10.4)
CHLORIDE BLD-SCNC: 108 MMOL/L (ref 98–107)
CO2: 23 MMOL/L (ref 20–31)
CREAT SERPL-MCNC: 0.85 MG/DL (ref 0.5–0.9)
EOSINOPHILS RELATIVE PERCENT: 4 % (ref 0–4)
GFR AFRICAN AMERICAN: >60 ML/MIN
GFR NON-AFRICAN AMERICAN: >60 ML/MIN
GFR SERPL CREATININE-BSD FRML MDRD: ABNORMAL ML/MIN/{1.73_M2}
GLUCOSE BLD-MCNC: 110 MG/DL (ref 70–99)
HCT VFR BLD CALC: 32.9 % (ref 36–46)
HEMOGLOBIN: 10.9 G/DL (ref 12–16)
LYMPHOCYTES # BLD: 43 % (ref 24–44)
MCH RBC QN AUTO: 27.6 PG (ref 26–34)
MCHC RBC AUTO-ENTMCNC: 33 G/DL (ref 31–37)
MCV RBC AUTO: 83.7 FL (ref 80–100)
MONOCYTES # BLD: 6 % (ref 1–7)
MORPHOLOGY: NORMAL
PDW BLD-RTO: 13.7 % (ref 11.5–14.9)
PLATELET # BLD: 299 K/UL (ref 150–450)
PMV BLD AUTO: 7.4 FL (ref 6–12)
POTASSIUM SERPL-SCNC: 4 MMOL/L (ref 3.7–5.3)
RBC # BLD: 3.94 M/UL (ref 4–5.2)
SEG NEUTROPHILS: 47 % (ref 36–66)
SEGMENTED NEUTROPHILS ABSOLUTE COUNT: 1.79 K/UL (ref 1.3–9.1)
SODIUM BLD-SCNC: 142 MMOL/L (ref 135–144)
TOTAL PROTEIN: 6.3 G/DL (ref 6.4–8.3)
WBC # BLD: 3.8 K/UL (ref 3.5–11)

## 2022-04-07 PROCEDURE — 99232 SBSQ HOSP IP/OBS MODERATE 35: CPT | Performed by: PSYCHIATRY & NEUROLOGY

## 2022-04-07 PROCEDURE — 6370000000 HC RX 637 (ALT 250 FOR IP): Performed by: FAMILY MEDICINE

## 2022-04-07 PROCEDURE — 36415 COLL VENOUS BLD VENIPUNCTURE: CPT

## 2022-04-07 PROCEDURE — 96372 THER/PROPH/DIAG INJ SC/IM: CPT

## 2022-04-07 PROCEDURE — 6360000002 HC RX W HCPCS: Performed by: FAMILY MEDICINE

## 2022-04-07 PROCEDURE — 85025 COMPLETE CBC W/AUTO DIFF WBC: CPT

## 2022-04-07 PROCEDURE — G0378 HOSPITAL OBSERVATION PER HR: HCPCS

## 2022-04-07 PROCEDURE — 80053 COMPREHEN METABOLIC PANEL: CPT

## 2022-04-07 RX ORDER — FERROUS SULFATE 325(65) MG
325 TABLET ORAL
Qty: 30 TABLET | Refills: 3 | Status: SHIPPED | OUTPATIENT
Start: 2022-04-07 | End: 2022-05-31

## 2022-04-07 RX ORDER — SULFAMETHOXAZOLE AND TRIMETHOPRIM 800; 160 MG/1; MG/1
1 TABLET ORAL EVERY 12 HOURS SCHEDULED
Status: DISCONTINUED | OUTPATIENT
Start: 2022-04-07 | End: 2022-04-07 | Stop reason: HOSPADM

## 2022-04-07 RX ORDER — LEVOTHYROXINE SODIUM 0.1 MG/1
100 TABLET ORAL DAILY
Qty: 30 TABLET | Refills: 3 | Status: SHIPPED | OUTPATIENT
Start: 2022-04-08 | End: 2022-08-17 | Stop reason: DRUGHIGH

## 2022-04-07 RX ORDER — SULFAMETHOXAZOLE AND TRIMETHOPRIM 800; 160 MG/1; MG/1
1 TABLET ORAL EVERY 12 HOURS SCHEDULED
Qty: 14 TABLET | Refills: 0 | Status: SHIPPED | OUTPATIENT
Start: 2022-04-07 | End: 2022-04-14

## 2022-04-07 RX ADMIN — POTASSIUM CHLORIDE 20 MEQ: 20 TABLET, EXTENDED RELEASE ORAL at 08:06

## 2022-04-07 RX ADMIN — ATORVASTATIN CALCIUM 40 MG: 40 TABLET, FILM COATED ORAL at 08:06

## 2022-04-07 RX ADMIN — ENOXAPARIN SODIUM 40 MG: 40 INJECTION SUBCUTANEOUS at 08:06

## 2022-04-07 RX ADMIN — LEVOTHYROXINE SODIUM 100 MCG: 0.1 TABLET ORAL at 05:35

## 2022-04-07 RX ADMIN — FERROUS SULFATE TAB 325 MG (65 MG ELEMENTAL FE) 325 MG: 325 (65 FE) TAB at 08:06

## 2022-04-07 RX ADMIN — ZOLPIDEM TARTRATE 5 MG: 5 TABLET ORAL at 00:22

## 2022-04-07 RX ADMIN — VILAZODONE HYDROCHLORIDE 40 MG: 40 TABLET ORAL at 08:08

## 2022-04-07 RX ADMIN — Medication 400 MG: at 08:06

## 2022-04-07 RX ADMIN — ARIPIPRAZOLE 5 MG: 5 TABLET ORAL at 08:08

## 2022-04-07 ASSESSMENT — ENCOUNTER SYMPTOMS
NAUSEA: 0
VOMITING: 0
ABDOMINAL PAIN: 0
EYE REDNESS: 0
SHORTNESS OF BREATH: 0
CHEST TIGHTNESS: 0
COLOR CHANGE: 0
BLOOD IN STOOL: 0
EYE ITCHING: 0
TROUBLE SWALLOWING: 0
COUGH: 0
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
## 2022-04-07 NOTE — DISCHARGE SUMMARY
Discharge Summary      Patient ID: Kevin Davalos    MRN: [id number]     Acct:  [de-identified]       Patient's PCP: Linda Dallas MD    Admit Date: 4/4/2022     Discharge Date:   4/7/2022      Admitting Physician: Linda Dallas MD    Discharge Physician: Jorge Luis Richardson MD     Discharge Diagnoses:    Primary Problem  AMS (altered mental status)    Principal Problem:    AMS (altered mental status)  Active Problems:    Hypokalemia    Hypothyroidism    Iron deficiency anemia    Acute cystitis without hematuria  Resolved Problems:    * No resolved hospital problems. *    Past Medical History:   Diagnosis Date    Abdominal pain     Arthritis     Asthma     Benign hypertension with CKD (chronic kidney disease) stage III ([facility])     CKD (chronic kidney disease) stage 3, GFR 30-59 ml/min ([facility])     GERD (gastroesophageal reflux disease)     Hyperlipidemia     Hypothyroidism     MVP (mitral valve prolapse)     Rectal bleeding     Scarlet fever     Sjogren - Ragini's syndrome      The patient was seen and examined on day of discharge and this discharge summary is in conjunction with daily progress note from day of discharge. Code Status:  Full Code    Hospital Course:   H&P Reviewed. Patient with a medical history of hypothyroidism on 75 mcg Synthyroid, bipolar on Abilify, iron deficiency anemia, status post cervical spine fusion in [place] was admitted with bipolar disorder and acute cystitis. Patient was started on Haldol 0.5 mg p.o. every 6 hours as needed for agitation. Psych services were consulted. Patient's TSH was elevated to 20.25 Synthyroid was increased to 100 mcg daily. Psych did not recommend inpatient psych admission. Patient symptom improved during hospital stay. Patient was started on Bactrim for acute cystitis. Patient is being discharged in stable condition. Discharge day progress note: Today   Patient was seen and examined at bedside today. Hemodynamically stable.   No chest pain, shortness of breath, fever, chills, nausea, vomiting, palpitations, or abdominal pain reported. No events reported overnight. Review of Systems   Constitutional: Negative for chills and fatigue. HENT: Negative for drooling, mouth sores, sneezing and trouble swallowing. Eyes: Negative for redness and itching. Respiratory: Negative for cough, chest tightness and shortness of breath. Cardiovascular: Negative for chest pain, palpitations and leg swelling. Gastrointestinal: Negative for abdominal pain, blood in stool, nausea and vomiting. Endocrine: Negative for heat intolerance and polyphagia. Genitourinary: Negative for difficulty urinating, flank pain and pelvic pain. Musculoskeletal: Negative for arthralgias, joint swelling and neck stiffness. Skin: Negative for color change and pallor. Allergic/Immunologic: Negative for food allergies. Neurological: Negative for dizziness, seizures and headaches. Hematological: Does not bruise/bleed easily. Psychiatric/Behavioral: Negative for agitation, behavioral problems, confusion, decreased concentration, dysphoric mood, hallucinations, self-injury, sleep disturbance and suicidal ideas. The patient is not nervous/anxious and is not hyperactive. Physical Exam  Vitals reviewed. HENT:      Head: Normocephalic. Right Ear: External ear normal.      Left Ear: External ear normal.      Nose: Nose normal.      Mouth/Throat:      Mouth: Mucous membranes are moist.      Pharynx: Oropharynx is clear. Eyes:      Conjunctiva/sclera: Conjunctivae normal.   Cardiovascular:      Rate and Rhythm: Normal rate and regular rhythm. Pulses: Normal pulses. Heart sounds: Normal heart sounds. Pulmonary:      Effort: Pulmonary effort is normal.      Breath sounds: Normal breath sounds. Abdominal:      General: Bowel sounds are normal.      Palpations: Abdomen is soft. Tenderness: There is no abdominal tenderness.  There is no right CVA tenderness or left CVA tenderness. Musculoskeletal:         General: No deformity. Cervical back: Normal range of motion and neck supple. Right lower leg: No edema. Left lower leg: No edema. Skin:     General: Skin is warm. Capillary Refill: Capillary refill takes less than 2 seconds. Coloration: Skin is not jaundiced. Neurological:      General: No focal deficit present. Mental Status: She is alert and oriented to person, place, and time. Mental status is at baseline. Cranial Nerves: No cranial nerve deficit. Sensory: No sensory deficit. Motor: No weakness. Coordination: Coordination normal.      Gait: Gait normal.      Deep Tendon Reflexes: Reflexes normal.   Psychiatric:         Mood and Affect: Mood normal.         Behavior: Behavior normal.         Thought Content:  Thought content normal.         Judgment: Judgment normal.         Consults:  IP CONSULT TO PRIMARY CARE PROVIDER  IP CONSULT TO PSYCHIATRY    Significant Diagnostic Studies: as above, and as follows:    Treatments: as above    Disposition: home    Discharged Condition: Stable    Follow Up:  Linda Dallas MD in one week    Discharge Medications:      Medication List      START taking these medications    ferrous sulfate 325 (65 Fe) MG tablet  Commonly known as: IRON 325  Take 1 tablet by mouth daily (with breakfast)     levothyroxine 100 MCG tablet  Commonly known as: SYNTHROID  Take 1 tablet by mouth Daily  Start taking on: April 8, 2022  Replaces: Levothyroxine Sodium 75 MCG Caps     sulfamethoxazole-trimethoprim 800-160 MG per tablet  Commonly known as: BACTRIM DS;SEPTRA DS  Take 1 tablet by mouth every 12 hours for 7 days        CONTINUE taking these medications    albuterol sulfate  (90 Base) MCG/ACT inhaler     ARIPiprazole 5 MG tablet  Commonly known as: ABILIFY     atorvastatin 40 MG tablet  Commonly known as: LIPITOR     * calcium carbonate 1250 (500 Ca) MG chewable tablet  Commonly known as: OS-ZULLY     * calcium carbonate 1250 (500 Ca) MG tablet  Commonly known as: OYSTER SHELL CALCIUM 500 mg     conjugated estrogens 0.625 MG/GM vaginal cream  Commonly known as: Premarin  Place 0.5 g vaginally twice a week     estradiol 0.1 MG/GM vaginal cream  Commonly known as: ESTRACE     eucerin cream     fenofibrate 145 MG tablet  Commonly known as: TRICOR     fluocinonide 0.05 % cream  Commonly known as: LIDEX  Apply topically 2 times daily. fluticasone 110 MCG/ACT inhaler  Commonly known as: FLOVENT HFA     gabapentin 300 MG capsule  Commonly known as: NEURONTIN     glucosamine-chondroitin 500-400 MG tablet     HYDROcodone-acetaminophen 5-325 MG per tablet  Commonly known as: NORCO     hydrOXYzine 10 MG tablet  Commonly known as: ATARAX     Krill Oil 500 MG Caps     L-Lysine 500 MG Caps     MM CETIRIZINE HCL PO     montelukast 10 MG tablet  Commonly known as: SINGULAIR     omega-3 acid ethyl esters 1 g capsule  Commonly known as: LOVAZA     POLYETHYLENE GLYCOL 3350 PO     potassium chloride 10 MEQ extended release tablet  Commonly known as: KLOR-CON M     SUMAtriptan 100 MG tablet  Commonly known as: IMITREX     * therapeutic multivitamin-minerals tablet     * Eye Vitamins & Minerals Tabs     vilazodone hcl 40 MG Tabs  Generic drug: vilazodone HCl     zolpidem 10 MG tablet  Commonly known as: AMBIEN         * This list has 4 medication(s) that are the same as other medications prescribed for you. Read the directions carefully, and ask your doctor or other care provider to review them with you.             STOP taking these medications    benzonatate 100 MG capsule  Commonly known as: TESSALON     benztropine 1 MG tablet  Commonly known as: COGENTIN     clindamycin 300 MG capsule  Commonly known as: CLEOCIN     cyclobenzaprine 10 MG tablet  Commonly known as: FLEXERIL     dextromethorphan 30 MG/5ML extended release liquid  Commonly known as: DELSYM     diclofenac sodium 1 % Gel  Commonly known as: Voltaren     Diclofenac Sodium Powd     doxycycline hyclate 100 MG capsule  Commonly known as: VIBRAMYCIN     lamoTRIgine 25 MG tablet  Commonly known as: LAMICTAL     Levothyroxine Sodium 75 MCG Caps  Replaced by: levothyroxine 100 MCG tablet     MAGNESIUM-ALUMINUM PO     Mucinex DM Maximum Strength  MG Tb12     naproxen 500 MG tablet  Commonly known as: NAPROSYN     nystatin 243401 UNIT/GM powder  Commonly known as: MYCOSTATIN     vitamin E 400 UNIT capsule           Where to Get Your Medications      These medications were sent to  [facility]  [address] [place] [address]    Phone: [phone]   · ferrous sulfate 325 (65 Fe) MG tablet  · levothyroxine 100 MCG tablet  · sulfamethoxazole-trimethoprim 800-160 MG per tablet                  Time Spent on discharge is more than  35 min in the examination, evaluation, counseling and review of medications and discharge plan.       Electronically signed by Chanel Ybarra MD     Copy sent to Dr. Michael Machuca MD
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
## 2022-04-07 NOTE — DISCHARGE INSTR - COC
Continuity of Care Form    Patient Name: Eunice Hanna   :  1955  MRN:  [id number]    Admit date:  2022  Discharge date:  22    Code Status Order: Full Code   Advance Directives:      Admitting Physician:  Penny Palacios MD  PCP: Penny Palacios MD    Discharging Nurse: [facility] Unit/Room#: 2115/2115-01  Discharging Unit Phone Number: ***    Emergency Contact:   Extended Emergency Contact Information  Primary Emergency Contact: [facility]  Address: [address], [address] [facility] [address] Phone: [phone]  Mobile Phone: [phone]  Relation: Spouse  Secondary Emergency Contact: Sisi pelayo  Home Phone: [phone]  Relation: Child    Past Surgical History:  Past Surgical History:   Procedure Laterality Date    APPENDECTOMY      BLADDER SURGERY      Bladder and rectum reconstruction    BREAST SURGERY Left     biopsy    COLONOSCOPY  14     RECALL 10 YRS , RECTAL SCAR BIOPSY COLONIC MUCOSA WITH FOCAL ULCER AND GRANULATION TISSUE     DENTAL SURGERY      complete teeth extraction    ELBOW SURGERY      EYE SURGERY      bilateral cataract    HAMMER TOE SURGERY Left 2020    TOE HAMMER REPAIR 2ND AND 3RD, DIPJ W/BONE BIOPSY OF 3RD [facility]      left    KNEE SURGERY      right    NECK SURGERY      OTHER SURGICAL HISTORY      lower back    RECTAL PROLAPSE REPAIR      SHOULDER SURGERY      SPINE SURGERY      TONSILLECTOMY      TUBAL LIGATION      bilateral    UPPER GASTROINTESTINAL ENDOSCOPY  2007    funal gastric polyp        Immunization History: There is no immunization history on file for this patient.     Active Problems:  Patient Active Problem List   Diagnosis Code    Menopause Z78.0    Rectal bleeding K62.5    Abdominal pain R10.9    Gastroesophageal reflux disease without esophagitis K21.9    Lower abdominal pain R10.30    Constipation K59.00    Rectal prolapse K62.3    Benign hypertension with CKD (chronic kidney disease) stage III ([facility]) I12.9, N18.30    CKD (chronic kidney disease) stage 3, GFR 30-59 ml/min ([facility]) N18.30    Hammer toe of left foot M20.42    Pain in toe of left foot M79.675    Bone cyst of foot M85.679    Digital mucinous cyst of toe of left foot M67.472    AMS (altered mental status) R41.82    Hypokalemia E87.6    Hypothyroidism E03.9    Iron deficiency anemia D50.9    Acute cystitis without hematuria N30.00       Isolation/Infection:   Isolation            No Isolation          Patient Infection Status       Infection Onset Added Last Indicated Last Indicated By Review Planned Expiration Resolved Resolved By    None active    Resolved    COVID-19 (Rule Out) 11/16/20 11/17/20 11/16/20 Covid-19 Ambulatory (Ordered)   11/19/20 Rule-Out Test Resulted    COVID-19 (Rule Out) 09/30/20 09/30/20 10/01/20 Covid-19 Ambulatory (Ordered)   10/02/20 Rule-Out Test Resulted            Nurse Assessment:  Last Vital Signs: BP (!) 143/76   Pulse 84   Temp 98.6 °F (37 °C) (Oral)   Resp 16   Ht 5' (1.524 m)   Wt 119 lb (54 kg)   SpO2 99%   BMI 23.24 kg/m²     Last documented pain score (0-10 scale): Pain Level: 0  Last Weight:   Wt Readings from Last 1 Encounters:   04/07/22 119 lb (54 kg)     Mental Status:  {IP PT MENTAL STATUS:77274}    IV Access:  { VENKAT IV ACCESS:588649112}    Nursing Mobility/ADLs:  Walking   {CHP DME SQBR:690583584}  Transfer  {P DME WGIF:145666388}  Bathing  {CHP DME YNWM:966485473}  Dressing  {CHP DME NLWZ:415979042}  Toileting  {CHP DME WNOT:939530386}  Feeding  {P DME GTCE:625141210}  Med Admin  {P DME DZUS:158444142}  Med Delivery   { VENKAT MED Delivery:767457899}    Wound Care Documentation and Therapy:        Elimination:  Continence:    Bowel: {YES / ZO:04355}  Bladder: {YES / US:70200}  Urinary Catheter: {Urinary Catheter:266284744}   Colostomy/Ileostomy/Ileal Conduit: {YES / CM:27250}       Date of Last BM: ***    Intake/Output Summary (Last 24 hours) at 4/7/2022 1427  Last data filed at 4/7/2022 0918  Gross per 24 hour   Intake 600 ml   Output 1125 ml   Net -525 ml     I/O last 3 completed shifts: In: 18 [P.O.:860]  Out: 1725 [Urine:1725]    Safety Concerns:     [employer] Safety Concerns:290630588}    Impairments/Disabilities:      [employer] Impairments/Disabilities:875091179}    Nutrition Therapy:  Current Nutrition Therapy:   [employer] Diet List:097422523}    Routes of Feeding: {CHP DME Other Feedings:341722806}  Liquids: {Slp liquid thickness:90624}  Daily Fluid Restriction: {CHP DME Yes amt example:567808352}  Last Modified Barium Swallow with Video (Video Swallowing Test): {Done Not Done TMCB:558060491}    Treatments at the Time of Hospital Discharge:   Respiratory Treatments: ***  Oxygen Therapy:  {Therapy; copd oxygen:73696}  Ventilator:    {MH CC Vent MMKQ:534616385}    Rehab Therapies: Physical Therapy and Occupational Therapy  Weight Bearing Status/Restrictions: Other Medical Equipment (for information only, NOT a DME order):     Other Treatments: skilled nursing assessment per protocol medication education     Patient's personal belongings (please select all that are sent with patient):  {CHP DME Belongings:725902121}    RN SIGNATURE:  {Esignature:862413929}    CASE MANAGEMENT/SOCIAL WORK SECTION    Inpatient Status Date: 4/4/22    Readmission Risk Assessment Score:  Readmission Risk              Risk of Unplanned Readmission:  0           Discharging to Facility/ [address] [facility] [address]   [address] [facility] [address]  [address] [facility]  P: [phone]  F: [phone]      Dialysis Facility (if applicable)   Name:  Address:  Dialysis Schedule:  Phone:  Fax:    / signature: Electronically signed by Pepper Oliva RN on 4/7/22 at 2:28 PM EDT    PHYSICIAN SECTION    Prognosis: Fair    Condition at Discharge: Stable    Rehab Potential (if transferring to Rehab): Fair    Recommended Labs or Other Treatments After Discharge:     Physician Certification: I certify the above information and transfer of Eunice Hanna  is necessary for the continuing treatment of the diagnosis listed and that she requires Home Care for less 30 days.      Update Admission H&P: No change in H&P    PHYSICIAN SIGNATURE:  Electronically signed by Kip Combs MD on 4/7/22 at 3:35 PM EDT

## 2022-04-07 NOTE — PLAN OF CARE
Problem: Infection:  Goal: Will remain free from infection  Description: Will remain free from infection  Outcome: Ongoing  Note: Patient remains afebrile;  no signs of erythema, edema, or warmth. Will continue to monitor for signs/symptoms of infection.        Problem: Safety:  Goal: Free from accidental physical injury  Description: Free from accidental physical injury  Outcome: Ongoing     Problem: Pain:  Goal: Patient's pain/discomfort is manageable  Description: Patient's pain/discomfort is manageable  Outcome: Ongoing     Problem: Discharge Planning:  Goal: Patients continuum of care needs are met  Description: Patients continuum of care needs are met  Outcome: Ongoing     Problem: Discharge Planning:  Goal: Patients continuum of care needs are met  Description: Patients continuum of care needs are met  Outcome: Ongoing

## 2022-04-07 NOTE — PROGRESS NOTES
RN went over discharge packet with patient. Patient verbalized understanding and asked questions appropriately. Patient said she will be able to be picked up in approximately one hour by her .

## 2022-04-07 NOTE — FLOWSHEET NOTE
04/07/22 1441   Encounter Summary   Services provided to: Patient   Referral/Consult From: Jared   Continue Visiting   (4-7-22 V)   Volunteer Visit Yes   Complexity of Encounter Low   Length of Encounter 15 minutes   Spiritual/Jain   Type Spiritual support   Intervention Communion   Sacraments   Communion Patient received communion

## 2022-04-11 NOTE — CARE COORDINATION
DISCHARGE PLANNING NOTE:    Received call from [facility] wanting update on patient. Notified them that pt was discharged on 4/7. They did not receive VENKAT or d/c med list. AVS faxed to [phone] [facility].     Electronically signed by Toyin Cage RN on 4/11/2022 at 11:49 AM

## 2022-04-26 ENCOUNTER — OFFICE VISIT (OUTPATIENT)
Dept: PODIATRY | Age: 67
End: 2022-04-26
Payer: MEDICARE

## 2022-04-26 VITALS — WEIGHT: 119 LBS | BODY MASS INDEX: 23.36 KG/M2 | HEIGHT: 60 IN

## 2022-04-26 DIAGNOSIS — B35.1 ONYCHOMYCOSIS: ICD-10-CM

## 2022-04-26 DIAGNOSIS — M20.42 HAMMER TOE OF LEFT FOOT: Primary | ICD-10-CM

## 2022-04-26 DIAGNOSIS — M79.675 PAIN OF GREAT TOE, LEFT: ICD-10-CM

## 2022-04-26 DIAGNOSIS — M19.072 OSTEOARTHRITIS OF TOE JOINT, LEFT: ICD-10-CM

## 2022-04-26 PROCEDURE — 99214 OFFICE O/P EST MOD 30 MIN: CPT | Performed by: PODIATRIST

## 2022-04-26 RX ORDER — MUPIROCIN CALCIUM 20 MG/G
CREAM TOPICAL
Qty: 1 EACH | Refills: 1 | Status: SHIPPED | OUTPATIENT
Start: 2022-04-26

## 2022-04-26 ASSESSMENT — ENCOUNTER SYMPTOMS
DIARRHEA: 0
CONSTIPATION: 0
BACK PAIN: 1
VOMITING: 0
NAUSEA: 0
COLOR CHANGE: 0

## 2022-04-26 NOTE — PROGRESS NOTES
[address] [facility]  Return Patient    Chief Complaint   Patient presents with   Glendia Hedge     left foot 2nd toe is red        Clover Hardwick is a 69y.o. year old female who is here with a complaint of pain left 2nd toe. for follow up of burning in toes, swelling. She is still having pain in the 2nd toe, edema b/l LE with some neuropathic pain. Tight shoe gear noted - sketchers    Post op from foot surgery. Type: condylectomy right 2nd toe, arthroplasty third DIPJ with biopsy left. Vital signs are stable. Patient denies N/V/F/C. She is also having pain in her left big toenail. The nail has gotten thick, and started to peel      Review of Systems   Constitutional: Negative for chills, diaphoresis, fatigue, fever and unexpected weight change. Cardiovascular: Positive for leg swelling. Gastrointestinal: Negative for constipation, diarrhea, nausea and vomiting. Musculoskeletal: Positive for arthralgias, back pain and gait problem. Negative for joint swelling. Skin: Negative for color change, pallor, rash and wound. Neurological: Negative for weakness and numbness. Vascular: DP and PT pulses palpable 2/4, Right Foot and 2/4 on the Left Foot. CFT <3 seconds, Right Foot and <3 seconds on the Left Foot. Edema is present b/l LE non-pitting,  Right Foot and presenton the Left Foot, minimal today. Varicosities present bilateral feet, ankles, legs. Neurological:   Sensation present  to light touch to level of digits, both feet. Musculoskeletal:   Muscle strength is 5/5 on the Right Foot and 5/5 on the Left Foot. Structural deformities are absent on the Right Foot and absent on the Left Foot. Slight edema to the left 2nd, contracted toe left 2nd, semi reducible. Pain to palpation in general to left toes 2nd, PIPJ. Minimal edema dorsal left foot. Integument:  Warm, dry, supple both feet. Left hallux nail has ridges and is thick distally, no incurvation. Radiographs: 3 views left Foot:  Arthroplasty of the third distal interphalangeal joint. Contracture of the second digit. Assesment :    Diagnosis Orders   1. Hammer toe of left foot  XR FOOT LEFT (MIN 3 VIEWS)   2. Osteoarthritis of toe joint, left  XR FOOT LEFT (MIN 3 VIEWS)   3. Onychomycosis     4. Pain of great toe, left           Plan: Pt was evaluated and examined. Patient was given personalized discharge instructions. Pt will follow up in as needed or sooner if any problems arise. Diagnosis was discussed with the pt and all of their questions were answered in detail. Proper foot hygiene and care was discussed with the pt. Patient to check feet daily and contact the office with any questions/problems/concerns. Other comorbidity noted and will be managed by PCP. Regular shoe, stiff sole, may need new shoes - no sketchers  Toe crest for the left to relieve 4th toe pain    Debrided left hallux nail    I discussed in detail hammertoe correction left 2nd with pinning. He/She was in full agreement and understood risks. The reason for surgery is due to unsuccessful non-operative treatment and/or conservative treatment is not a viable option. It was discussed with the patient that compliance postoperatively is of utmost importance. Any deviation on behalf of the patient will decrease the chances of a successful outcome. Patient acknowledged, understands, and would like to move forward with surgery as discussed. The patient was given a consent outlining the general risk of surgery as well as the specifics to the surgical plan. This was carefully discussed giving all options, indications and contraindications regarding the procedure outlined in the consent. All questions were answered to the patient's satisfaction. The patient signed the consent form confirming complete understanding and acceptance of the risks of stated.  I specifically stated and inquired if the patient understands and accepts the risks of surgery including infection, failure, prolonged pain, swelling, numbness, recurrence, limited mobility,painful scar, RSDS, overcorrection, under-correction, and loss of limb/life. Death, bleeding, blood clots in the veins or lungs, tendon or blood vessel disturbance, bony conditions, continued pain,stiffness, weakness, and limited function. These were all listed on the consent. Additionally, the postoperative course and treatment was outlined for the patient. Discussion consisted of postoperatively the patient needs to keep the foot elevated for at least the first initial two weeks. I have encouraged movements such as moving from the bed to the sofa or recliner, to the kitchen and the bathroom; quick bursts of movement with the foot elevated. Longstanding periods of time such as cooking, cleaning, and shopping are not permitted. I reminded the patient that there are only two reasons to have surgery. That being, if their function is impaired and also if they are having pain. If they can answer yes to both these questions, I will move forward with surgery. If they can not, there is no reason to proceed with surgical intervention. Orders Placed This Encounter   Medications    mupirocin (BACTROBAN) 2 % cream     Sig: Apply 2g topically daily.      Dispense:  1 each     Refill:  1       Follow up as needed

## 2022-05-19 ENCOUNTER — OFFICE VISIT (OUTPATIENT)
Dept: GASTROENTEROLOGY | Age: 67
End: 2022-05-19
Payer: MEDICARE

## 2022-05-19 VITALS
BODY MASS INDEX: 24.02 KG/M2 | WEIGHT: 123 LBS | DIASTOLIC BLOOD PRESSURE: 80 MMHG | SYSTOLIC BLOOD PRESSURE: 150 MMHG | HEART RATE: 68 BPM

## 2022-05-19 DIAGNOSIS — R10.31 ABDOMINAL PAIN, RIGHT LOWER QUADRANT: ICD-10-CM

## 2022-05-19 DIAGNOSIS — K21.9 GASTROESOPHAGEAL REFLUX DISEASE, UNSPECIFIED WHETHER ESOPHAGITIS PRESENT: Primary | ICD-10-CM

## 2022-05-19 PROCEDURE — 99204 OFFICE O/P NEW MOD 45 MIN: CPT | Performed by: INTERNAL MEDICINE

## 2022-05-19 ASSESSMENT — ENCOUNTER SYMPTOMS
RESPIRATORY NEGATIVE: 1
ABDOMINAL PAIN: 1
ALLERGIC/IMMUNOLOGIC NEGATIVE: 1
VOMITING: 1
NAUSEA: 1

## 2022-05-19 NOTE — PROGRESS NOTES
Reason for Referral:   No referring provider defined for this encounter. Chief Complaint   Patient presents with    Gastroesophageal Reflux     pt would like to discuss doing egd            HISTORY OF PRESENT ILLNESS: Annmarie Meyer is a 69 y.o. female , referred for evaluation of*GERD, rectal bleeding , PUD    new return   not seen since 2016   Lost follow-up in the interim  She is back today because she has been having pain in the right lower side  And because her acid reflux is flaring up on her    Previous at that time Colonoscopy showed an ulcer at the anastomosis from previous surgeries in her rectum (for prolapse)  She had another colonoscopy by Dr. Candelaria Duane and the results are as below    today pain in RLQ , marked with black ink dot     No diarrhea   no bleeding   no f/c   GERD is acting up on her     colonoscopy Dr Jamie Bryant   Diverticula were found in the sigmoid colon. There was evidence of   diverticular spasm. There was some slight narrowing and scarring of the   colon but no evidence of stricture or blockage. The exam was otherwise without abnormality. No evidence of polyp tumor   or colitis  anorectal exam performed under anesthesia. Patient with lax sphincter   tone. There appeared to be internal intussusception but no   full-thickness rectal prolapse. I was unable to have any mucosal   prolapse be on the anal canal  Estimated Blood Loss: Estimated blood loss: none. Impression: - Moderate diverticulosis in the sigmoid colon. There   was evidence of diverticular spasm. - The examination was otherwise normal.  - No specimens collected. Recommendation: - Discharge patient to home. - Written discharge instructions were provided to the   patient.  - Return to my office.  - Repeat colonoscopy in 10 years for screening   purposes. MD Jose Luis Tsagn MD    Past Medical,Family, and Social History reviewed and does contribute to the patient presentingcondition.     Patient's PMH/PSH,SH,PSYCH Hx, MEDs, ALLERGIES, and ROS were all reviewed and updated in the appropriate sections. PAST MEDICAL HISTORY:  Past Medical History:   Diagnosis Date    Abdominal pain     Arthritis     Asthma     Benign hypertension with CKD (chronic kidney disease) stage III (HCC)     CKD (chronic kidney disease) stage 3, GFR 30-59 ml/min (HCC)     GERD (gastroesophageal reflux disease)     Hyperlipidemia     Hypothyroidism     MVP (mitral valve prolapse)     Rectal bleeding     Scarlet fever     Sjogren - Ragini's syndrome        Past Surgical History:   Procedure Laterality Date    APPENDECTOMY      BLADDER SURGERY      Bladder and rectum reconstruction    BREAST SURGERY Left     biopsy    COLONOSCOPY  01/22/16     RECALL 10 YRS , RECTAL SCAR BIOPSY COLONIC MUCOSA WITH FOCAL ULCER AND GRANULATION TISSUE     DENTAL SURGERY      complete teeth extraction    ELBOW SURGERY      EYE SURGERY      bilateral cataract    HAMMER TOE SURGERY Left 11/20/2020    TOE HAMMER REPAIR 2ND AND 3RD, DIPJ W/BONE BIOPSY OF 3RD performed by Benjamin Castellon DPM at [facility]      left    KNEE SURGERY      right    NECK SURGERY      OTHER SURGICAL HISTORY      lower back    RECTAL PROLAPSE REPAIR      SHOULDER SURGERY      SPINE SURGERY      TONSILLECTOMY      TUBAL LIGATION      bilateral    UPPER GASTROINTESTINAL ENDOSCOPY  2007    funal gastric polyp        CURRENT MEDICATIONS:    Current Outpatient Medications:     mupirocin (BACTROBAN) 2 % cream, Apply 2g topically daily. , Disp: 1 each, Rfl: 1    ferrous sulfate (IRON 325) 325 (65 Fe) MG tablet, Take 1 tablet by mouth daily (with breakfast), Disp: 30 tablet, Rfl: 3    levothyroxine (SYNTHROID) 100 MCG tablet, Take 1 tablet by mouth Daily, Disp: 30 tablet, Rfl: 3    ARIPiprazole (ABILIFY) 5 MG tablet, Take 5 mg by mouth daily, Disp: , Rfl:     calcium carbonate (OS-ZULLY) 1250 (500 Ca) MG chewable tablet, Take 1,250 mg by mouth daily, Disp: , Rfl:     MM CETIRIZINE HCL PO, 10 mg, Disp: , Rfl:     fluticasone (FLOVENT HFA) 110 MCG/ACT inhaler, Inhale 1 puff into the lungs 2 times daily, Disp: , Rfl:     gabapentin (NEURONTIN) 300 MG capsule, Take 100 mg by mouth daily. , Disp: , Rfl:     fluocinonide (LIDEX) 0.05 % cream, Apply topically 2 times daily. , Disp: 30 g, Rfl: 0    albuterol sulfate  (90 Base) MCG/ACT inhaler, Inhale 2 puffs into the lungs, Disp: , Rfl:     calcium carbonate (OYSTER SHELL CALCIUM 500 MG) 1250 (500 Ca) MG tablet, Take 1,250 mg by mouth, Disp: , Rfl:     estradiol (ESTRACE) 0.1 MG/GM vaginal cream, See Admin Instructions 2 times a wek, Disp: , Rfl:     Multiple Vitamins-Minerals (EYE VITAMINS & MINERALS) TABS, Eye Vitamin and Minerals, Disp: , Rfl:     fenofibrate (TRICOR) 145 MG tablet, TAKE 1 TABLET BY MOUTH ONE TIME A DAY WITH a MEAL, Disp: , Rfl: 3    hydrOXYzine (ATARAX) 10 MG tablet, Take 10 mg by mouth daily as needed , Disp: , Rfl:     Krill Oil 500 MG CAPS, Take 1 capsule by mouth, Disp: , Rfl:     omega-3 acid ethyl esters (LOVAZA) 1 g capsule, Take 2 g by mouth, Disp: , Rfl:     POLYETHYLENE GLYCOL 3350 PO, Take 17 g by mouth, Disp: , Rfl:     potassium chloride (KLOR-CON M) 10 MEQ extended release tablet, Take 10 mEq by mouth 2 times daily , Disp: , Rfl:     Skin Protectants, Misc. (EUCERIN) cream, Apply topically, Disp: , Rfl:     glucosamine-chondroitin 500-400 MG tablet, Take 1 tablet by mouth, Disp: , Rfl:     conjugated estrogens (PREMARIN) 0.625 MG/GM vaginal cream, Place 0.5 g vaginally twice a week, Disp: 1 Tube, Rfl: 6    vilazodone HCl (VILAZODONE HCL) 40 MG TABS, Take 40 mg by mouth daily, Disp: , Rfl:     L-Lysine 500 MG CAPS, Take  by mouth., Disp: , Rfl:     Multiple Vitamins-Minerals (THERAPEUTIC MULTIVITAMIN-MINERALS) tablet, Take 1 tablet by mouth daily. , Disp: , Rfl:     HYDROcodone-acetaminophen (NORCO) 5-325 MG per tablet, Take 1 tablet by mouth daily as needed. , Disp: , Rfl:     SUMAtriptan (IMITREX) 100 MG tablet, Take 100 mg by mouth once as needed. , Disp: , Rfl:     zolpidem (AMBIEN) 10 MG tablet, Take 10 mg by mouth nightly as needed. , Disp: , Rfl:     atorvastatin (LIPITOR) 40 MG tablet, Take 40 mg by mouth daily. , Disp: , Rfl:     montelukast (SINGULAIR) 10 MG tablet, Take 10 mg by mouth nightly., Disp: , Rfl:     ALLERGIES:   Allergies   Allergen Reactions    Latex     Nickel     Benadryl [Diphenhydramine]     Cefadroxil     Cephalexin Itching    Cyclosporine Other (See Comments)     burning    Demerol Hcl [Meperidine]     Erythromycin      Other reaction(s): Unknown (qualifier value)    Lansoprazole      Other reaction(s): Unknown (qualifier value)    Lomefloxacin      Other reaction(s): Unknown (qualifier value)    Loracarbef      Other reaction(s): Unknown (qualifier value)    Meperidine Hcl      Other reaction(s): Hallucinations    Other Other (See Comments) and Itching    Percocet [Oxycodone-Acetaminophen]     Pneumococcal Vaccines Other (See Comments)    Seroquel [Quetiapine Fumarate]     Adhesive Tape Rash       FAMILY HISTORY:       Problem Relation Age of Onset    Heart Disease Mother     Stroke Father          SOCIAL HISTORY:   Social History     Socioeconomic History    Marital status:      Spouse name: Not on file    Number of children: Not on file    Years of education: Not on file    Highest education level: Not on file   Occupational History    Not on file   Tobacco Use    Smoking status: Never Smoker    Smokeless tobacco: Never Used   Vaping Use    Vaping Use: Never used   Substance and Sexual Activity    Alcohol use: No    Drug use: No    Sexual activity: Never   Other Topics Concern    Not on file   Social History Narrative    Not on file     Social Determinants of Health     Financial Resource Strain:     Difficulty of Paying Living Expenses: Not on file   Food Insecurity:     Worried About [facility] in the Last Year: Not on file    [place] of Food in the Last Year: Not on file   Transportation Needs:     Lack of Transportation (Medical): Not on file    Lack of Transportation (Non-Medical): Not on file   Physical Activity:     Days of Exercise per Week: Not on file    Minutes of Exercise per Session: Not on file   Stress:     Feeling of Stress : Not on file   Social Connections:     Frequency of Communication with Friends and Family: Not on file    Frequency of Social Gatherings with Friends and Family: Not on file    Attends Bahai Services: Not on file    Active Member of [address] [place] [address] or Organizations: Not on file    Attends Club or Organization Meetings: Not on file    Marital Status: Not on file   Intimate Partner Violence:     Fear of Current or Ex-Partner: Not on file    Emotionally Abused: Not on file    Physically Abused: Not on file    Sexually Abused: Not on file   Housing Stability:     Unable to Pay for Housing in the Last Year: Not on file    Number of [place] in the Last Year: Not on file    Unstable Housing in the Last Year: Not on file       REVIEW OF SYSTEMS: A 12-point review of systemswas obtained and pertinent positives and negatives were enumerated above in the history of present illness. All other reviewed systems / symptoms were negative. Review of Systems   Constitutional: Positive for appetite change. HENT: Negative. Eyes: Positive for visual disturbance (glasses ). Respiratory: Negative. Cardiovascular: Negative. Gastrointestinal: Positive for abdominal pain, nausea and vomiting. Endocrine: Negative. Genitourinary: Negative. Musculoskeletal: Negative. Skin: Negative. Allergic/Immunologic: Negative. Neurological: Negative. Hematological: Negative. Psychiatric/Behavioral: Negative.             LABORATORY DATA: Reviewed  Lab Results   Component Value Date    WBC 3.8 04/07/2022    HGB 10.9 (L) 04/07/2022    HCT 32.9 (L) 04/07/2022    MCV 83.7 04/07/2022     04/07/2022     04/07/2022    K 4.0 04/07/2022     (H) 04/07/2022    CO2 23 04/07/2022    BUN 17 04/07/2022    CREATININE 0.85 04/07/2022    LABALBU 3.6 04/07/2022    BILITOT 0.22 (L) 04/07/2022    ALKPHOS 82 04/07/2022    AST 24 04/07/2022    ALT 15 04/07/2022         Lab Results   Component Value Date    RBC 3.94 (L) 04/07/2022    HGB 10.9 (L) 04/07/2022    MCV 83.7 04/07/2022    MCH 27.6 04/07/2022    MCHC 33.0 04/07/2022    RDW 13.7 04/07/2022    MPV 7.4 04/07/2022    BASOPCT 0 04/07/2022    LYMPHSABS 1.63 04/07/2022    MONOSABS 0.23 04/07/2022    NEUTROABS 1.79 04/07/2022    EOSABS 0.15 04/07/2022    BASOSABS 0.00 04/07/2022         DIAGNOSTIC TESTING:     XR FOOT LEFT (MIN 3 VIEWS)    Result Date: 4/26/2022  Radiology exam is complete. No Radiologist dictation. Please follow up with ordering provider. BP (!) 150/80   Pulse 68   Wt 123 lb (55.8 kg)   BMI 24.02 kg/m²     PHYSICAL EXAMINATION: Vital signs reviewed per the nursing documentation. Body mass index is 24.02 kg/m². Physical Exam  Vitals and nursing note reviewed. Constitutional:       General: She is not in acute distress. Appearance: She is well-developed. She is not diaphoretic. HENT:      Head: Normocephalic. Mouth/Throat:      Pharynx: No oropharyngeal exudate. Eyes:      General: No scleral icterus. Pupils: Pupils are equal, round, and reactive to light. Neck:      Thyroid: No thyromegaly. Vascular: No JVD. Trachea: No tracheal deviation. Cardiovascular:      Rate and Rhythm: Normal rate and regular rhythm. Heart sounds: Normal heart sounds. No murmur heard. Pulmonary:      Effort: Pulmonary effort is normal. No respiratory distress. Breath sounds: Normal breath sounds. No wheezing. Abdominal:      General: Bowel sounds are normal. There is no distension. Palpations: Abdomen is soft. Tenderness: There is no abdominal tenderness. There is no guarding or rebound. Comments: No ascites   Musculoskeletal:         General: Normal range of motion. Cervical back: Normal range of motion and neck supple. Skin:     General: Skin is warm. Coloration: Skin is not pale. Findings: No erythema or rash. Comments: She is not diaphoretic   Neurological:      Mental Status: She is alert and oriented to person, place, and time. Deep Tendon Reflexes: Reflexes are normal and symmetric. Psychiatric:         Behavior: Behavior normal.         Thought Content: Thought content normal.         Judgment: Judgment normal.         IMPRESSION: Ms. Rochelle Victoria is a 69 y.o. female with      Diagnosis Orders   1. Gastroesophageal reflux disease, unspecified whether esophagitis present  EGD   2. Abdominal pain, right lower quadrant  CT ABDOMEN PELVIS W IV CONTRAST    EGD   Symptomatic treatment at present we will proceed with the above testing and get it from there  Continue with PPI        Diet/life style/natural hx /complication of the dx were all explained in details   Past medical, past surgical, social history, psychiatric history, medications or allergies, all reviewed and  updated        Thank you for allowing me to participate in the care of Ms. Rochelle Victoria. For any further questions please do not hesitate to contact me. I have reviewed and agree with the MA/RN ROS. Note is dictated utilizing voice recognition software. Unfortunately this leads to occasional typographical errors. Please contact our office if you have any questions.     Jade Rey MD  [facility]  O: #[phone]

## 2022-05-20 ENCOUNTER — TELEPHONE (OUTPATIENT)
Dept: GASTROENTEROLOGY | Age: 67
End: 2022-05-20

## 2022-05-20 NOTE — TELEPHONE ENCOUNTER
Writer called and lvm informing pt her PAT phone call with [facility] is scheduled 6/1 @ 315pm

## 2022-05-26 ENCOUNTER — HOSPITAL ENCOUNTER (OUTPATIENT)
Dept: CT IMAGING | Age: 67
Discharge: HOME | End: 2022-05-28
Payer: MEDICARE

## 2022-05-26 DIAGNOSIS — R10.31 ABDOMINAL PAIN, RIGHT LOWER QUADRANT: ICD-10-CM

## 2022-05-26 LAB
GFR NON-AFRICAN AMERICAN: 40 ML/MIN
GFR SERPL CREATININE-BSD FRML MDRD: 49 ML/MIN
GFR SERPL CREATININE-BSD FRML MDRD: ABNORMAL ML/MIN/{1.73_M2}
POC CREATININE: 1.32 MG/DL (ref 0.51–1.19)

## 2022-05-26 PROCEDURE — 82565 ASSAY OF CREATININE: CPT

## 2022-05-26 PROCEDURE — 74176 CT ABD & PELVIS W/O CONTRAST: CPT

## 2022-05-26 PROCEDURE — 6360000004 HC RX CONTRAST MEDICATION: Performed by: INTERNAL MEDICINE

## 2022-05-26 RX ORDER — SODIUM CHLORIDE 0.9 % (FLUSH) 0.9 %
10 SYRINGE (ML) INJECTION PRN
Status: DISCONTINUED | OUTPATIENT
Start: 2022-05-26 | End: 2022-05-26

## 2022-05-26 RX ORDER — 0.9 % SODIUM CHLORIDE 0.9 %
80 INTRAVENOUS SOLUTION INTRAVENOUS ONCE
Status: DISCONTINUED | OUTPATIENT
Start: 2022-05-26 | End: 2022-05-26

## 2022-05-26 RX ADMIN — IOHEXOL 50 ML: 240 INJECTION, SOLUTION INTRATHECAL; INTRAVASCULAR; INTRAVENOUS; ORAL at 11:49

## 2022-05-31 ENCOUNTER — HOSPITAL ENCOUNTER (OUTPATIENT)
Dept: PREADMISSION TESTING | Age: 67
Discharge: HOME | End: 2022-06-04

## 2022-05-31 VITALS
OXYGEN SATURATION: 100 % | WEIGHT: 122 LBS | DIASTOLIC BLOOD PRESSURE: 88 MMHG | BODY MASS INDEX: 23.95 KG/M2 | SYSTOLIC BLOOD PRESSURE: 143 MMHG | HEART RATE: 92 BPM | RESPIRATION RATE: 16 BRPM | HEIGHT: 60 IN

## 2022-05-31 RX ORDER — PREDNISOLONE ACETATE 10 MG/ML
SUSPENSION/ DROPS OPHTHALMIC
Status: ON HOLD | COMMUNITY
Start: 2022-05-03 | End: 2022-06-08

## 2022-05-31 RX ORDER — FUROSEMIDE 20 MG/1
TABLET ORAL DAILY
COMMUNITY
Start: 2022-05-16

## 2022-05-31 RX ORDER — DOCUSATE SODIUM 100 MG/1
CAPSULE, LIQUID FILLED ORAL
COMMUNITY
Start: 2022-05-01

## 2022-05-31 RX ORDER — BUDESONIDE AND FORMOTEROL FUMARATE DIHYDRATE 80; 4.5 UG/1; UG/1
2 AEROSOL RESPIRATORY (INHALATION) 2 TIMES DAILY
COMMUNITY
Start: 2022-05-24

## 2022-06-01 ENCOUNTER — HOSPITAL ENCOUNTER (OUTPATIENT)
Dept: PREADMISSION TESTING | Age: 67
Discharge: HOME | End: 2022-06-05

## 2022-06-01 VITALS — HEIGHT: 60 IN | WEIGHT: 122 LBS | BODY MASS INDEX: 23.95 KG/M2

## 2022-06-01 NOTE — PROGRESS NOTES
Pre-op Instructions For Out-Patient Endoscopy Surgery    Medication Instructions:  · Please stop herbs and any supplements now (includes vitamins and minerals). · Please contact your surgeon and prescribing physician for pre-op instructions for any blood thinners. · If you have inhalers/aerosol treatments at home, please use them the morning of your surgery and bring the inhalers with you to the hospital.    · Please take the following medications the morning of your surgery with a sip of water:  Levothyroxine, Abilify, Vilazodone. Surgery Instructions:  1. After midnight before surgery:  Do not eat or drink anything, including water, mints, gum, and hard candy. You may brush your teeth without swallowing. No smoking, chewing tobacco, or street drugs. 2. Please shower or bathe before surgery. 3. Please do not wear any cologne, lotion, powder, jewelry, piercings, perfume, makeup, nail polish, hair accessories, or hair spray on the day of surgery. Wear loose comfortable clothing. 4. Leave your valuables at home. Bring a storage case for any glasses/contacts. 5. An adult who is responsible for you MUST drive you home and should be with you for the first 24 hours after surgery. The Day of Surgery:  · Arrive at [address] [place] [address] Surgery Entrance at the time directed by your surgeon and check in at the desk. · If you have a living will or healthcare power of , please bring a copy. · You will be taken to the pre-op holding area where you will be prepared for surgery. A physical assessment will be performed by a nurse practitioner or house officer. Your IV will be started and you will meet your anesthesiologist.    · When you go to surgery, your family will be directed to the surgical waiting room, where the doctor should speak with them after your surgery.     · After surgery, you will be taken to the recovery room then when you are awake and stable you will go to the short stay unit for preparation to be discharged. Instructions read to [facility]. And understanding verbalized.

## 2022-06-07 ENCOUNTER — ANESTHESIA EVENT (OUTPATIENT)
Dept: ENDOSCOPY | Age: 67
End: 2022-06-07
Payer: MEDICARE

## 2022-06-07 ENCOUNTER — ANESTHESIA EVENT (OUTPATIENT)
Dept: OPERATING ROOM | Age: 67
End: 2022-06-07
Payer: MEDICARE

## 2022-06-08 ENCOUNTER — HOSPITAL ENCOUNTER (OUTPATIENT)
Age: 67
Setting detail: OUTPATIENT SURGERY
Discharge: HOME | End: 2022-06-08
Attending: INTERNAL MEDICINE | Admitting: INTERNAL MEDICINE
Payer: MEDICARE

## 2022-06-08 ENCOUNTER — ANESTHESIA (OUTPATIENT)
Dept: ENDOSCOPY | Age: 67
End: 2022-06-08
Payer: MEDICARE

## 2022-06-08 VITALS
DIASTOLIC BLOOD PRESSURE: 65 MMHG | WEIGHT: 122 LBS | TEMPERATURE: 98.6 F | SYSTOLIC BLOOD PRESSURE: 133 MMHG | BODY MASS INDEX: 23.95 KG/M2 | HEIGHT: 60 IN | HEART RATE: 82 BPM | OXYGEN SATURATION: 95 % | RESPIRATION RATE: 17 BRPM

## 2022-06-08 PROCEDURE — 7100000010 HC PHASE II RECOVERY - FIRST 15 MIN: Performed by: INTERNAL MEDICINE

## 2022-06-08 PROCEDURE — 88305 TISSUE EXAM BY PATHOLOGIST: CPT

## 2022-06-08 PROCEDURE — 3700000000 HC ANESTHESIA ATTENDED CARE: Performed by: INTERNAL MEDICINE

## 2022-06-08 PROCEDURE — 3700000001 HC ADD 15 MINUTES (ANESTHESIA): Performed by: INTERNAL MEDICINE

## 2022-06-08 PROCEDURE — 88342 IMHCHEM/IMCYTCHM 1ST ANTB: CPT

## 2022-06-08 PROCEDURE — 3609012400 HC EGD TRANSORAL BIOPSY SINGLE/MULTIPLE: Performed by: INTERNAL MEDICINE

## 2022-06-08 PROCEDURE — 2709999900 HC NON-CHARGEABLE SUPPLY: Performed by: INTERNAL MEDICINE

## 2022-06-08 PROCEDURE — 2500000003 HC RX 250 WO HCPCS

## 2022-06-08 PROCEDURE — 6360000002 HC RX W HCPCS

## 2022-06-08 PROCEDURE — 2580000003 HC RX 258: Performed by: ANESTHESIOLOGY

## 2022-06-08 PROCEDURE — 43239 EGD BIOPSY SINGLE/MULTIPLE: CPT | Performed by: INTERNAL MEDICINE

## 2022-06-08 PROCEDURE — 7100000011 HC PHASE II RECOVERY - ADDTL 15 MIN: Performed by: INTERNAL MEDICINE

## 2022-06-08 RX ORDER — FENTANYL CITRATE 50 UG/ML
50 INJECTION, SOLUTION INTRAMUSCULAR; INTRAVENOUS EVERY 5 MIN PRN
Status: CANCELLED | OUTPATIENT
Start: 2022-06-08

## 2022-06-08 RX ORDER — LIDOCAINE HYDROCHLORIDE 20 MG/ML
INJECTION, SOLUTION EPIDURAL; INFILTRATION; INTRACAUDAL; PERINEURAL PRN
Status: DISCONTINUED | OUTPATIENT
Start: 2022-06-08 | End: 2022-06-08 | Stop reason: SDUPTHER

## 2022-06-08 RX ORDER — ONDANSETRON 2 MG/ML
4 INJECTION INTRAMUSCULAR; INTRAVENOUS
Status: CANCELLED | OUTPATIENT
Start: 2022-06-08 | End: 2022-06-08

## 2022-06-08 RX ORDER — PROPOFOL 10 MG/ML
INJECTION, EMULSION INTRAVENOUS PRN
Status: DISCONTINUED | OUTPATIENT
Start: 2022-06-08 | End: 2022-06-08 | Stop reason: SDUPTHER

## 2022-06-08 RX ORDER — SODIUM CHLORIDE, SODIUM LACTATE, POTASSIUM CHLORIDE, CALCIUM CHLORIDE 600; 310; 30; 20 MG/100ML; MG/100ML; MG/100ML; MG/100ML
INJECTION, SOLUTION INTRAVENOUS CONTINUOUS
Status: DISCONTINUED | OUTPATIENT
Start: 2022-06-08 | End: 2022-06-08 | Stop reason: HOSPADM

## 2022-06-08 RX ORDER — FENTANYL CITRATE 50 UG/ML
25 INJECTION, SOLUTION INTRAMUSCULAR; INTRAVENOUS EVERY 5 MIN PRN
Status: CANCELLED | OUTPATIENT
Start: 2022-06-08

## 2022-06-08 RX ORDER — SODIUM CHLORIDE 9 MG/ML
INJECTION, SOLUTION INTRAVENOUS PRN
Status: CANCELLED | OUTPATIENT
Start: 2022-06-08

## 2022-06-08 RX ORDER — SODIUM CHLORIDE 9 MG/ML
INJECTION, SOLUTION INTRAVENOUS PRN
Status: DISCONTINUED | OUTPATIENT
Start: 2022-06-08 | End: 2022-06-08 | Stop reason: HOSPADM

## 2022-06-08 RX ORDER — SODIUM CHLORIDE 0.9 % (FLUSH) 0.9 %
5-40 SYRINGE (ML) INJECTION PRN
Status: CANCELLED | OUTPATIENT
Start: 2022-06-08

## 2022-06-08 RX ORDER — LIDOCAINE HYDROCHLORIDE 10 MG/ML
1 INJECTION, SOLUTION EPIDURAL; INFILTRATION; INTRACAUDAL; PERINEURAL
Status: DISCONTINUED | OUTPATIENT
Start: 2022-06-08 | End: 2022-06-08 | Stop reason: HOSPADM

## 2022-06-08 RX ORDER — SODIUM CHLORIDE 0.9 % (FLUSH) 0.9 %
5-40 SYRINGE (ML) INJECTION EVERY 12 HOURS SCHEDULED
Status: CANCELLED | OUTPATIENT
Start: 2022-06-08

## 2022-06-08 RX ORDER — SODIUM CHLORIDE 0.9 % (FLUSH) 0.9 %
5-40 SYRINGE (ML) INJECTION PRN
Status: DISCONTINUED | OUTPATIENT
Start: 2022-06-08 | End: 2022-06-08 | Stop reason: HOSPADM

## 2022-06-08 RX ORDER — SODIUM CHLORIDE 0.9 % (FLUSH) 0.9 %
5-40 SYRINGE (ML) INJECTION EVERY 12 HOURS SCHEDULED
Status: DISCONTINUED | OUTPATIENT
Start: 2022-06-08 | End: 2022-06-08 | Stop reason: HOSPADM

## 2022-06-08 RX ADMIN — LIDOCAINE HYDROCHLORIDE 80 MG: 20 INJECTION, SOLUTION EPIDURAL; INFILTRATION; INTRACAUDAL; PERINEURAL at 09:40

## 2022-06-08 RX ADMIN — PROPOFOL 160 MG: 10 INJECTION, EMULSION INTRAVENOUS at 09:40

## 2022-06-08 RX ADMIN — SODIUM CHLORIDE, POTASSIUM CHLORIDE, SODIUM LACTATE AND CALCIUM CHLORIDE: 600; 310; 30; 20 INJECTION, SOLUTION INTRAVENOUS at 09:01

## 2022-06-08 ASSESSMENT — PAIN - FUNCTIONAL ASSESSMENT: PAIN_FUNCTIONAL_ASSESSMENT: 0-10

## 2022-06-08 NOTE — H&P
HISTORY and [id number]       NAME:  Raul Moise  MRN: [id number]   YOB: 1955   Date: 6/8/2022   Age: 69 y.o. Gender: female       COMPLAINT AND PRESENT HISTORY:                Raul Moise is 69 y.o.,  female, undergoing for EGD ESOPHAGOGASTRODUODENOSCOPY. Pt is being seen for hx of:  GERD AND ABDOMINAL PAIN  Patient has had previous endoscopy done. Patient denies any FH of Esophogeal Cancer. Patient complains of frequent heartburn, more and more. Pt has hx of GERD  and is taking PPI/  Antacid. Patient reports  Dysphagia, she states that she was in the hospital recently using Thicken with her foods.    help. Pt admits to regurgitation. Patient complains of RLQ pains   no nausea and no vomiting . Patient denies any indigestion, difficulty swallowing  or heartburn. Pt complains of  Constipation and has a protrusion in anal area with BM, no changes in the color, caliber or consistency of the stools. Hx of any significant  medical hx   Asthma, CKD, HTN, HLD, HEART MURMUR, MVP, THYROID, hx of Scarlet fever, Sjogren syndrome    Denies current chest pain, palpitations, SOB. No recent URI, fever or chills. Hx of smoking: no    Pt denies any issues with Anesthesia. Patient has been NPO since midnight. No blood thinners in the past 5-7 days. Pt took virbrv, abilify and synthroid      PAST MEDICAL HISTORY     Past Medical History:   Diagnosis Date    Abdominal pain 04/04/2022    Cannot rule out Anerior infarct, when compared with EKG on 11- QT has lengthened.  Abnormal EKG 04/04/2022    Anemia     Anesthesia     raw throat after procedures except for last 3. Used smaller tube for intubation.     Arthritis     Asthma     Benign hypertension with CKD (chronic kidney disease) stage III (HCC)     CKD (chronic kidney disease) stage 3, GFR 30-59 ml/min (HCC)     GERD (gastroesophageal reflux disease)     History of blood transfusion     x1    Hyperlipidemia     Hypokalemia     Hypothyroidism     Murmur     MVP (mitral valve prolapse)     Rectal bleeding     Scarlet fever     age 7    Sjogren - Ragini's syndrome     dry eyes, dry mouth    Wears glasses        SURGICAL HISTORY       Past Surgical History:   Procedure Laterality Date    APPENDECTOMY      BACK SURGERY       7 fusions  thoracic-lumbar-cervical involvement    BLADDER SURGERY      Bladder and rectum reconstruction    BLEPHAROPLASTY      upper    BREAST SURGERY Left     biopsy (negative)    COLONOSCOPY  01/22/16     RECALL 10 YRS , RECTAL SCAR BIOPSY COLONIC MUCOSA WITH FOCAL ULCER AND GRANULATION TISSUE     DENTAL SURGERY      complete teeth extraction    ELBOW SURGERY Left     EYE SURGERY      bilateral cataract    EYE SURGERY      cataracts,     FRACTURE SURGERY      HAMMER TOE SURGERY Left 11/20/2020    TOE HAMMER REPAIR 2ND AND 3RD, DIPJ W/BONE BIOPSY OF 3RD performed by Arcadio Soto DPM at [facility] Left      meniscectomy    KNEE SURGERY      right knee meniscectomy    NECK SURGERY      hardware    OTHER SURGICAL HISTORY      lower back    RECTAL PROLAPSE REPAIR      x2    SHOULDER SURGERY Bilateral     RTC repairs    SKIN BIOPSY      SPINE SURGERY      TONSILLECTOMY      TUBAL LIGATION      bilateral    UPPER GASTROINTESTINAL ENDOSCOPY  2007    funal gastric polyp     UPPER GASTROINTESTINAL ENDOSCOPY      bleedig ulcer       FAMILY HISTORY       Family History   Problem Relation Age of Onset    Heart Disease Mother     Stroke Father        SOCIAL HISTORY       Social History     Socioeconomic History    Marital status:      Spouse name: Not on file    Number of children: Not on file    Years of education: Not on file    Highest education level: Not on file   Occupational History    Not on file   Tobacco Use    Smoking status: Never Smoker    Smokeless tobacco: Never Used Vaping Use    Vaping Use: Never used   Substance and Sexual Activity    Alcohol use: Never    Drug use: No    Sexual activity: Never   Other Topics Concern    Not on file   Social History Narrative    Not on file     Social Determinants of Health     Financial Resource Strain:     Difficulty of Paying Living Expenses: Not on file   Food Insecurity:     Worried About Running Out of Food in the Last Year: Not on file    [place] of Food in the Last Year: Not on file   Transportation Needs:     Lack of Transportation (Medical): Not on file    Lack of Transportation (Non-Medical):  Not on file   Physical Activity:     Days of Exercise per Week: Not on file    Minutes of Exercise per Session: Not on file   Stress:     Feeling of Stress : Not on file   Social Connections:     Frequency of Communication with Friends and Family: Not on file    Frequency of Social Gatherings with Friends and Family: Not on file    Attends [employer] Services: Not on file    Active Member of [address] [place] [address] or Organizations: Not on file    Attends Club or Organization Meetings: Not on file    Marital Status: Not on file   Intimate Partner Violence:     Fear of Current or Ex-Partner: Not on file    Emotionally Abused: Not on file    Physically Abused: Not on file    Sexually Abused: Not on file   Housing Stability:     Unable to Pay for Housing in the Last Year: Not on file    Number of [place] in the Last Year: Not on file    Unstable Housing in the Last Year: Not on file           REVIEW OF SYSTEMS      Allergies   Allergen Reactions    Latex Rash     Very sensitive to latex bandaids/ adhesive dressings    Nickel Rash          Benadryl [Diphenhydramine]     Cefadroxil Itching    Cyclosporine Other (See Comments)     burning    Demerol Hcl [Meperidine] Hallucinations    Erythromycin      Other reaction(s): Unknown (qualifier value)    Keflex [Cephalexin] Itching    Lansoprazole      Other reaction(s): Unknown (qualifier value)/ doesn't recall reaction    Lomefloxacin      Other reaction(s): Unknown (qualifier value)    Loracarbef Itching     Other reaction(s): Unknown (qualifier value)    Meperidine Hcl      Other reaction(s): Hallucinations    Other Other (See Comments) and Itching    Percocet [Oxycodone-Acetaminophen]     Pneumococcal Vaccines Other (See Comments)    Seroquel [Quetiapine Fumarate] Other (See Comments)     Uncontrollable facial /mouth movements    Adhesive Tape Rash       No current facility-administered medications on file prior to encounter. Current Outpatient Medications on File Prior to Encounter   Medication Sig Dispense Refill    mupirocin (BACTROBAN) 2 % cream Apply 2g topically daily. 1 each 1    levothyroxine (SYNTHROID) 100 MCG tablet Take 1 tablet by mouth Daily (Patient taking differently: Take 88 mcg by mouth Daily ) 30 tablet 3    ARIPiprazole (ABILIFY) 5 MG tablet Take 5 mg by mouth daily      gabapentin (NEURONTIN) 300 MG capsule Take 100 mg by mouth daily.  fluocinonide (LIDEX) 0.05 % cream Apply topically 2 times daily. 30 g 0    albuterol sulfate  (90 Base) MCG/ACT inhaler Inhale 2 puffs into the lungs      calcium carbonate (OYSTER SHELL CALCIUM 500 MG) 1250 (500 Ca) MG tablet Take 1,250 mg by mouth      Multiple Vitamins-Minerals (EYE VITAMINS & MINERALS) TABS Eye Vitamin and Minerals      fenofibrate (TRICOR) 145 MG tablet TAKE 1 TABLET BY MOUTH ONE TIME A DAY WITH a MEAL  3    hydrOXYzine (ATARAX) 10 MG tablet Take 10 mg by mouth daily as needed       Krill Oil 500 MG CAPS Take 1 capsule by mouth      omega-3 acid ethyl esters (LOVAZA) 1 g capsule Take 2 g by mouth      POLYETHYLENE GLYCOL 3350 PO Take 17 g by mouth      potassium chloride (KLOR-CON M) 10 MEQ extended release tablet Take 10 mEq by mouth 2 times daily 2 tablets in am & pm      Skin Protectants, Misc.  (EUCERIN) cream Apply topically      glucosamine-chondroitin 500-400 MG tablet Take 1 tablet by mouth      conjugated estrogens (PREMARIN) 0.625 MG/GM vaginal cream Place 0.5 g vaginally twice a week 1 Tube 6    vilazodone HCl (VILAZODONE HCL) 40 MG TABS Take 40 mg by mouth daily      L-Lysine 500 MG CAPS Take by mouth daily       Multiple Vitamins-Minerals (THERAPEUTIC MULTIVITAMIN-MINERALS) tablet Take 1 tablet by mouth daily.  HYDROcodone-acetaminophen (NORCO) 5-325 MG per tablet Take 1 tablet by mouth daily as needed.  SUMAtriptan (IMITREX) 100 MG tablet Take 100 mg by mouth once as needed.  zolpidem (AMBIEN) 10 MG tablet Take 10 mg by mouth nightly as needed.  atorvastatin (LIPITOR) 40 MG tablet Take 40 mg by mouth daily.  montelukast (SINGULAIR) 10 MG tablet Take 10 mg by mouth nightly. Negative except for what is mentioned in the HPI. GENERAL PHYSICAL EXAM     Vitals :   See vital signs in RN flow sheet. GENERAL APPEARANCE:   Zhang Emmanuel is 69 y.o., female, moderately obese, nourished, conscious, alert. Does not appear to be distress or pain at this time. SKIN:  Warm, dry, no cyanosis or jaundice. HEAD:  Normocephalic, atraumatic, no swelling or tenderness. EYES:  Pupils equal, reactive to light. EARS:  No discharge, no marked hearing loss. NOSE:  No rhinorrhea, epistaxis or septal deformity. THROAT:  Not congested. No ulceration bleeding or discharge. NECK:  No stiffness, trachea central.  No palpable masses or L.N.                 CHEST:  Symmetrical and equal on expansion. HEART:  RRR . No audible murmurs or gallops. LUNGS:  Equal on expansion, normal breath sounds. No adventitious sounds. ABDOMEN:  Obese. Soft on palpation. No dysphagia, No localized tenderness. No guarding or rigidity. LYMPHATICS:  No palpable cervical lymphadenopathy.      LOCOMOTOR, BACK AND SPINE:  No tenderness or deformities. EXTREMITIES:  Symmetrical, no pretibial edema. No discoloration or ulcerations. NEUROLOGIC:  The patient is conscious, alert, oriented,Cranial nerve II-XII intact, taste and smell were not examined. No apparent focal sensory or motor deficits.              PROVISIONAL DIAGNOSES / SURGERY:        EGD ESOPHAGOGASTRODUODENOSCOPY    GERD AND ABDOMINAL PAIN      Patient Active Problem List    Diagnosis Date Noted    Acute cystitis without hematuria 04/07/2022    Iron deficiency anemia 04/06/2022    AMS (altered mental status) 04/04/2022    Hypokalemia 04/04/2022    Hammer toe of left foot     Pain in toe of left foot     Bone cyst of foot     Digital mucinous cyst of toe of left foot     Benign hypertension with CKD (chronic kidney disease) stage III (HCC)     CKD (chronic kidney disease) stage 3, GFR 30-59 ml/min ([facility] [address]     Hypothyroidism 06/22/2016    Gastroesophageal reflux disease without esophagitis 02/22/2016    Lower abdominal pain 02/22/2016    Constipation 02/22/2016    Rectal prolapse 02/22/2016    Abdominal pain     Rectal bleeding     Menopause 07/24/2014           SHANNAN NUNO, APRN - CNP on 6/8/2022 at 7:57 AM

## 2022-06-08 NOTE — OP NOTE
PROCEDURE NOTE    DATE OF PROCEDURE: 6/8/2022     SURGEON: Jabari Orellana MD  Facility: [facility]  ASSISTANT: None  Anesthesia: MAC  PREOPERATIVE DIAGNOSIS:   Abdominal pain  GERD    Diagnosis:  Schatzki's ring  Gastritis biopsies were taken  Random small bowel biopsies were taken      POSTOPERATIVE DIAGNOSIS: As described below    OPERATION: Upper GI endoscopy with Biopsy    ANESTHESIA: Moderate Sedation     ESTIMATED BLOOD LOSS: Less than 50 ml    COMPLICATIONS: None. SPECIMENS:  Was Obtained:     As above     HISTORY: The patient is a 69y.o. year old female with history of above preop diagnosis. I recommended esophagogastroduodenoscopy with possible biopsy and I explained the risk, benefits, expected outcome, and alternatives to the procedure. Risks included but are not limited to bleeding, infection, respiratory distress, hypotension, and perforation of the esophagus, stomach, or duodenum. Patient understands and is in agreement. The patient was counseled at length about the risks of elizabeth Covid-19 during their perioperative period and any recovery window from their procedure. The patient was made aware that elizabeth Covid-19  may worsen their prognosis for recovering from their procedure  and lend to a higher morbidity and/or mortality risk. All material risks, benefits, and reasonable alternatives including postponing the procedure were discussed. The patient does wish to proceed with the procedure at this time. PROCEDURE: The patient was given IV conscious sedation. The patient's SPO2 remained above 90% throughout the procedure. The gastroscope was inserted orally and advanced under direct vision through the esophagus, through the stomach, through the pylorus, and into the descending duodenum. Post sedation note : The patient's SPO2 remained above 90% throughout the procedure. the vital signs remained stable , and no immediate complication form the procedure noted, patient will be ready for d/c when criteria is met . Findings:    Retropharyngeal area was grossly normal appearing    Esophagus: abnormal: Schatzki's ring    Stomach:  Gastritis biopsies were taken      Duodenum:   Random small bowel biopsies were taken        The scope was removed and the patient tolerated the procedure well. Recommendations/Plan:   1. F/U Biopsies  2. F/U In Office in 3-4 weeks  3.  Discussed with the family    Electronically signed by Raven Naranjo MD  on 6/8/2022 at 10:06 AM

## 2022-06-08 NOTE — PROGRESS NOTES
Called pt to remind needs Medical clearance for case here. 6-13-22. Encouraged pt to call pcp for status/needs for medical clearance. Pt agreed.

## 2022-06-08 NOTE — ANESTHESIA PRE PROCEDURE
Department of Anesthesiology  Preprocedure Note       Name:  Bridger Guido   Age:  69 y.o.  :  1955                                          MRN:  [id number]         Date:  2022      Surgeon: Maximo Magaña):  Nohemi Mercado MD    Procedure: Procedure(s):  EGD ESOPHAGOGASTRODUODENOSCOPY    Medications prior to admission:   Prior to Admission medications    Medication Sig Start Date End Date Taking? Authorizing Provider   Dextran 70-Hypromellose (LUBRICANT EYE DROPS, PF, OP) Apply to eye   Yes Historical Provider, MD FLYNNULARY Place 2 drops into both eyes every 4 hours as needed (moisturizing eye drop)   Yes Historical Provider, MD   CRANBERRY PO Take 1 tablet by mouth daily    Historical Provider, MD   budesonide-formoterol (SYMBICORT) 80-4.5 MCG/ACT AERO Inhale 2 puffs into the lungs 2 times daily 22   Historical Provider, MD   docusate sodium (COLACE) 100 mg capsule TAKE 1 CAPSULE BY MOUTH TWO TIMES A DAY AS NEEDED FOR CONSTIPATION 22   Historical Provider, MD   furosemide (LASIX) 20 MG tablet daily  22   Historical Provider, MD   mupirocin (BACTROBAN) 2 % cream Apply 2g topically daily. 22   Milagros Palacios DPM   levothyroxine (SYNTHROID) 100 MCG tablet Take 1 tablet by mouth Daily  Patient taking differently: Take 88 mcg by mouth Daily  22   Shana Quinones MD   ARIPiprazole (ABILIFY) 5 MG tablet Take 5 mg by mouth daily    Historical Provider, MD   gabapentin (NEURONTIN) 300 MG capsule Take 100 mg by mouth daily. 20   Historical Provider, MD   fluocinonide (LIDEX) 0.05 % cream Apply topically 2 times daily.  20   Milagros Palacios DPM   albuterol sulfate  (90 Base) MCG/ACT inhaler Inhale 2 puffs into the lungs 7/15/19   Historical Provider, MD   calcium carbonate (OYSTER SHELL CALCIUM 500 MG) 1250 (500 Ca) MG tablet Take 1,250 mg by mouth    Historical Provider, MD   Multiple Vitamins-Minerals (EYE VITAMINS & MINERALS) TABS Eye Vitamin and Minerals ringers infusion   IntraVENous Continuous Jordan Piña  mL/hr at 06/08/22 0905 NoRateChange at 06/08/22 0905    sodium chloride flush 0.9 % injection 5-40 mL  5-40 mL IntraVENous 2 times per day Jordan Piña MD        sodium chloride flush 0.9 % injection 5-40 mL  5-40 mL IntraVENous PRN Jordan Piña MD        0.9 % sodium chloride infusion   IntraVENous PRN Jordan Piña MD           Allergies:     Allergies   Allergen Reactions    Latex Rash     Very sensitive to latex bandaids/ adhesive dressings    Nickel Rash          Benadryl [Diphenhydramine]     Cefadroxil Itching    Cyclosporine Other (See Comments)     burning    Demerol Hcl [Meperidine] Hallucinations    Erythromycin      Other reaction(s): Unknown (qualifier value)    Keflex [Cephalexin] Itching    Lansoprazole      Other reaction(s): Unknown (qualifier value)/ doesn't recall reaction    Lomefloxacin      Other reaction(s): Unknown (qualifier value)    Loracarbef Itching     Other reaction(s): Unknown (qualifier value)    Meperidine Hcl      Other reaction(s): Hallucinations    Other Other (See Comments) and Itching    Percocet [Oxycodone-Acetaminophen]     Pneumococcal Vaccines Other (See Comments)    Seroquel [Quetiapine Fumarate] Other (See Comments)     Uncontrollable facial /mouth movements    Adhesive Tape Rash       Problem List:    Patient Active Problem List   Diagnosis Code    Menopause Z78.0    Rectal bleeding K62.5    Abdominal pain R10.9    Gastroesophageal reflux disease without esophagitis K21.9    Lower abdominal pain R10.30    Constipation K59.00    Rectal prolapse K62.3    Benign hypertension with CKD (chronic kidney disease) stage III ([facility]) I12.9, N18.30    CKD (chronic kidney disease) stage 3, GFR 30-59 ml/min ([facility]) N18.30    Hammer toe of left foot M20.42    Pain in toe of left foot M79.675    Bone cyst of foot M85.679    Digital mucinous cyst of toe of left foot M67.472    AMS (altered mental status) R41.82    Hypokalemia E87.6    Hypothyroidism E03.9    Iron deficiency anemia D50.9    Acute cystitis without hematuria N30.00       Past Medical History:        Diagnosis Date    Abdominal pain 04/04/2022    Cannot rule out Anerior infarct, when compared with EKG on 11- QT has lengthened.  Abnormal EKG 04/04/2022    Anemia     Anesthesia     raw throat after procedures except for last 3. Used smaller tube for intubation.     Arthritis     Asthma     Benign hypertension with CKD (chronic kidney disease) stage III ([facility])     CKD (chronic kidney disease) stage 3, GFR 30-59 ml/min ([facility])     GERD (gastroesophageal reflux disease)     History of blood transfusion     x1    Hyperlipidemia     Hypokalemia     Hypothyroidism     Murmur     MVP (mitral valve prolapse)     Rectal bleeding     Scarlet fever     age 7    Sjogren - Ragini's syndrome     dry eyes, dry mouth    Wears glasses        Past Surgical History:        Procedure Laterality Date    APPENDECTOMY      BACK SURGERY       7 fusions  thoracic-lumbar-cervical involvement    BLADDER SURGERY      Bladder and rectum reconstruction    BLEPHAROPLASTY      upper    BREAST SURGERY Left     biopsy (negative)    COLONOSCOPY  01/22/16     RECALL 10 YRS , RECTAL SCAR BIOPSY COLONIC MUCOSA WITH FOCAL ULCER AND GRANULATION TISSUE     DENTAL SURGERY      complete teeth extraction    ELBOW SURGERY Left     EYE SURGERY      bilateral cataract    EYE SURGERY      cataracts,     FRACTURE SURGERY      HAMMER TOE SURGERY Left 11/20/2020    TOE HAMMER REPAIR 2ND AND 3RD, DIPJ W/BONE BIOPSY OF 3RD performed by Meliton Kimball DPM at [facility] (CERVIX STATUS UNKNOWN)      KNEE SURGERY Left      meniscectomy    KNEE SURGERY      right knee meniscectomy    NECK SURGERY      hardware    OTHER SURGICAL HISTORY      lower back    RECTAL PROLAPSE REPAIR      x2    SHOULDER SURGERY Bilateral RTC repairs    SKIN BIOPSY      SPINE SURGERY      TONSILLECTOMY      TUBAL LIGATION      bilateral    UPPER GASTROINTESTINAL ENDOSCOPY  2007    funal gastric polyp     UPPER GASTROINTESTINAL ENDOSCOPY      bleedig ulcer       Social History:    Social History     Tobacco Use    Smoking status: Never Smoker    Smokeless tobacco: Never Used   Substance Use Topics    Alcohol use: Never                                Counseling given: Not Answered      Vital Signs (Current):   Vitals:    06/08/22 0827   BP: (!) 142/71   Pulse: 89   Resp: 16   Temp: 97.1 °F (36.2 °C)   TempSrc: Infrared   SpO2: 99%   Weight: 122 lb (55.3 kg)   Height: 5' (1.524 m)                                              BP Readings from Last 3 Encounters:   06/08/22 (!) 142/71   05/31/22 (!) 143/88   05/19/22 (!) 150/80       NPO Status: Time of last liquid consumption: 2359                        Time of last solid consumption: 2359                        Date of last liquid consumption: 06/07/22                        Date of last solid food consumption: 06/07/22    BMI:   Wt Readings from Last 3 Encounters:   06/08/22 122 lb (55.3 kg)   06/01/22 122 lb (55.3 kg)   05/31/22 122 lb (55.3 kg)     Body mass index is 23.83 kg/m².     CBC:   Lab Results   Component Value Date    WBC 3.8 04/07/2022    RBC 3.94 04/07/2022    HGB 10.9 04/07/2022    HCT 32.9 04/07/2022    MCV 83.7 04/07/2022    RDW 13.7 04/07/2022     04/07/2022       CMP:   Lab Results   Component Value Date     04/07/2022    K 4.0 04/07/2022     04/07/2022    CO2 23 04/07/2022    BUN 17 04/07/2022    CREATININE 1.32 05/26/2022    CREATININE 0.85 04/07/2022    GFRAA >60 04/07/2022    LABGLOM 40 05/26/2022    GLUCOSE 110 04/07/2022    PROT 6.3 04/07/2022    CALCIUM 9.0 04/07/2022    BILITOT 0.22 04/07/2022    ALKPHOS 82 04/07/2022    AST 24 04/07/2022    ALT 15 04/07/2022       POC Tests: No results for input(s): POCGLU, POCNA, POCK, POCCL, POCBUN, POCHEMO, POCHCT in the last 72 hours. Coags: No results found for: PROTIME, INR, APTT    HCG (If Applicable): No results found for: PREGTESTUR, PREGSERUM, HCG, HCGQUANT     ABGs: No results found for: PHART, PO2ART, FMT0GAP, FOR3JFH, BEART, R0WXGNCH     Type & Screen (If Applicable):  No results found for: LABABO, LABRH    Drug/Infectious Status (If Applicable):  No results found for: HIV, HEPCAB    COVID-19 Screening (If Applicable):   Lab Results   Component Value Date    COVID19 Not Detected 04/04/2022    COVID19 Not Detected 11/16/2020           Anesthesia Evaluation  Patient summary reviewed and Nursing notes reviewed history of anesthetic complications (raw throat after procedures except for last 3. Used smaller tube for intubation. ):   Airway: Mallampati: II  TM distance: >3 FB   Neck ROM: full  Mouth opening: > = 3 FB   Dental:    (+) edentulous      Pulmonary:normal exam  breath sounds clear to auscultation  (+) COPD (on CXR):  asthma:                            Cardiovascular:    (+) hypertension:, valvular problems/murmurs: MVP,       ECG reviewed  Rhythm: regular  Rate: normal                    Neuro/Psych:   Negative Neuro/Psych ROS              GI/Hepatic/Renal:   (+) GERD:, renal disease: CRI,           Endo/Other:    (+) hypothyroidism: arthritis: OA., .                  ROS comment:  Sjogren - Ragini's syndrome -  dry eyes, dry mouth  Abdominal:             Vascular: negative vascular ROS. Other Findings:           Anesthesia Plan      general     ASA 2       Induction: intravenous. MIPS: Prophylactic antiemetics administered. Anesthetic plan and risks discussed with patient. Plan discussed with CRNA.                     Dorita Mittal MD   6/8/2022

## 2022-06-08 NOTE — ANESTHESIA POSTPROCEDURE EVALUATION
Department of Anesthesiology  Postprocedure Note    Patient: Finn Cardenas  MRN: [id number]  YOB: 1955  Date of evaluation: 6/8/2022  Time:  12:02 PM     Procedure Summary     Date: 06/08/22 Room / Location: [place] [address] 03 / 250 [facility]    Anesthesia Start: 6625 Anesthesia Stop: 2251    Procedure: EGD BIOPSY (N/A Esophagus) Diagnosis: (GERD AND ABDOMINAL PAIN)    Surgeons: Cornelius Coy MD Responsible Provider: Kishor Tinajero MD    Anesthesia Type: general ASA Status: 2          Anesthesia Type: No value filed. Soto Phase I:      Soto Phase II: Soto Score: 10    Last vitals: Reviewed and per EMR flowsheets.        Anesthesia Post Evaluation    Comments: POST- ANESTHESIA EVALUATION       Pt Name: Finn Cardenas  MRN: [id number]  YOB: 1955  Date of evaluation: 6/8/2022  Time:  12:03 PM      /65   Pulse 82   Temp 98.6 °F (37 °C)   Resp 17   Ht 5' (1.524 m)   Wt 122 lb (55.3 kg)   SpO2 95%   BMI 23.83 kg/m²      Consciousness Level  Awake  Cardiopulmonary Status  Stable  Pain Adequately Treated YES  Nausea / Vomiting  NO  Adequate Hydration  YES  Anesthesia Related Complications NONE      Electronically signed by Kishor Tinajero MD on 6/8/2022 at 12:03 PM

## 2022-06-09 LAB — SURGICAL PATHOLOGY REPORT: NORMAL

## 2022-06-13 ENCOUNTER — HOSPITAL ENCOUNTER (OUTPATIENT)
Age: 67
Setting detail: OUTPATIENT SURGERY
Discharge: HOME | End: 2022-06-13
Attending: PODIATRIST | Admitting: PODIATRIST
Payer: MEDICARE

## 2022-06-13 ENCOUNTER — ANESTHESIA (OUTPATIENT)
Dept: OPERATING ROOM | Age: 67
End: 2022-06-13
Payer: MEDICARE

## 2022-06-13 VITALS
HEART RATE: 82 BPM | TEMPERATURE: 96.2 F | WEIGHT: 126.38 LBS | OXYGEN SATURATION: 95 % | DIASTOLIC BLOOD PRESSURE: 80 MMHG | SYSTOLIC BLOOD PRESSURE: 142 MMHG | BODY MASS INDEX: 24.68 KG/M2 | RESPIRATION RATE: 12 BRPM

## 2022-06-13 DIAGNOSIS — G89.18 POST-OP PAIN: Primary | ICD-10-CM

## 2022-06-13 PROCEDURE — 3700000001 HC ADD 15 MINUTES (ANESTHESIA): Performed by: PODIATRIST

## 2022-06-13 PROCEDURE — 7100000010 HC PHASE II RECOVERY - FIRST 15 MIN: Performed by: PODIATRIST

## 2022-06-13 PROCEDURE — 7100000000 HC PACU RECOVERY - FIRST 15 MIN: Performed by: PODIATRIST

## 2022-06-13 PROCEDURE — 2500000003 HC RX 250 WO HCPCS: Performed by: PODIATRIST

## 2022-06-13 PROCEDURE — 28285 REPAIR OF HAMMERTOE: CPT | Performed by: PODIATRIST

## 2022-06-13 PROCEDURE — 7100000011 HC PHASE II RECOVERY - ADDTL 15 MIN: Performed by: PODIATRIST

## 2022-06-13 PROCEDURE — 2500000003 HC RX 250 WO HCPCS: Performed by: ANESTHESIOLOGY

## 2022-06-13 PROCEDURE — 6370000000 HC RX 637 (ALT 250 FOR IP)

## 2022-06-13 PROCEDURE — 7100000001 HC PACU RECOVERY - ADDTL 15 MIN: Performed by: PODIATRIST

## 2022-06-13 PROCEDURE — 2580000003 HC RX 258

## 2022-06-13 PROCEDURE — 3700000000 HC ANESTHESIA ATTENDED CARE: Performed by: PODIATRIST

## 2022-06-13 PROCEDURE — 6360000002 HC RX W HCPCS

## 2022-06-13 PROCEDURE — 3600000013 HC SURGERY LEVEL 3 ADDTL 15MIN: Performed by: PODIATRIST

## 2022-06-13 PROCEDURE — 2709999900 HC NON-CHARGEABLE SUPPLY: Performed by: PODIATRIST

## 2022-06-13 PROCEDURE — 3600000003 HC SURGERY LEVEL 3 BASE: Performed by: PODIATRIST

## 2022-06-13 PROCEDURE — 6360000002 HC RX W HCPCS: Performed by: ANESTHESIOLOGY

## 2022-06-13 DEVICE — K WIRE FIX L6IN DIA0.062IN 1600662] MICROAIRE SURGICAL INSTRUMENTS INC]: Type: IMPLANTABLE DEVICE | Site: SECOND TOE | Status: FUNCTIONAL

## 2022-06-13 RX ORDER — SODIUM CHLORIDE 0.9 % (FLUSH) 0.9 %
5-40 SYRINGE (ML) INJECTION PRN
Status: DISCONTINUED | OUTPATIENT
Start: 2022-06-13 | End: 2022-06-13 | Stop reason: HOSPADM

## 2022-06-13 RX ORDER — SODIUM CHLORIDE 0.9 % (FLUSH) 0.9 %
5-40 SYRINGE (ML) INJECTION EVERY 12 HOURS SCHEDULED
Status: DISCONTINUED | OUTPATIENT
Start: 2022-06-13 | End: 2022-06-13 | Stop reason: HOSPADM

## 2022-06-13 RX ORDER — SODIUM CHLORIDE 9 MG/ML
25 INJECTION, SOLUTION INTRAVENOUS PRN
Status: DISCONTINUED | OUTPATIENT
Start: 2022-06-13 | End: 2022-06-13 | Stop reason: HOSPADM

## 2022-06-13 RX ORDER — DEXAMETHASONE SODIUM PHOSPHATE 10 MG/ML
INJECTION, SOLUTION INTRAMUSCULAR; INTRAVENOUS PRN
Status: DISCONTINUED | OUTPATIENT
Start: 2022-06-13 | End: 2022-06-13 | Stop reason: SDUPTHER

## 2022-06-13 RX ORDER — PROMETHAZINE HYDROCHLORIDE 25 MG/ML
6.25 INJECTION, SOLUTION INTRAMUSCULAR; INTRAVENOUS EVERY 5 MIN PRN
Status: DISCONTINUED | OUTPATIENT
Start: 2022-06-13 | End: 2022-06-13 | Stop reason: HOSPADM

## 2022-06-13 RX ORDER — MORPHINE SULFATE 2 MG/ML
2 INJECTION, SOLUTION INTRAMUSCULAR; INTRAVENOUS EVERY 5 MIN PRN
Status: DISCONTINUED | OUTPATIENT
Start: 2022-06-13 | End: 2022-06-13 | Stop reason: HOSPADM

## 2022-06-13 RX ORDER — MIDAZOLAM HYDROCHLORIDE 2 MG/2ML
2 INJECTION, SOLUTION INTRAMUSCULAR; INTRAVENOUS
Status: DISCONTINUED | OUTPATIENT
Start: 2022-06-13 | End: 2022-06-13 | Stop reason: HOSPADM

## 2022-06-13 RX ORDER — ONDANSETRON 2 MG/ML
INJECTION INTRAMUSCULAR; INTRAVENOUS PRN
Status: DISCONTINUED | OUTPATIENT
Start: 2022-06-13 | End: 2022-06-13 | Stop reason: SDUPTHER

## 2022-06-13 RX ORDER — IPRATROPIUM BROMIDE AND ALBUTEROL SULFATE 2.5; .5 MG/3ML; MG/3ML
1 SOLUTION RESPIRATORY (INHALATION)
Status: DISCONTINUED | OUTPATIENT
Start: 2022-06-13 | End: 2022-06-13 | Stop reason: HOSPADM

## 2022-06-13 RX ORDER — FENTANYL CITRATE 50 UG/ML
INJECTION, SOLUTION INTRAMUSCULAR; INTRAVENOUS PRN
Status: DISCONTINUED | OUTPATIENT
Start: 2022-06-13 | End: 2022-06-13 | Stop reason: SDUPTHER

## 2022-06-13 RX ORDER — LIDOCAINE HYDROCHLORIDE 10 MG/ML
INJECTION, SOLUTION EPIDURAL; INFILTRATION; INTRACAUDAL; PERINEURAL PRN
Status: DISCONTINUED | OUTPATIENT
Start: 2022-06-13 | End: 2022-06-13 | Stop reason: SDUPTHER

## 2022-06-13 RX ORDER — SODIUM CHLORIDE 9 MG/ML
INJECTION INTRAVENOUS
Status: DISCONTINUED
Start: 2022-06-13 | End: 2022-06-13 | Stop reason: HOSPADM

## 2022-06-13 RX ORDER — LIDOCAINE HYDROCHLORIDE 10 MG/ML
INJECTION, SOLUTION INFILTRATION; PERINEURAL
Status: COMPLETED
Start: 2022-06-13 | End: 2022-06-13

## 2022-06-13 RX ORDER — PROPOFOL 10 MG/ML
INJECTION, EMULSION INTRAVENOUS PRN
Status: DISCONTINUED | OUTPATIENT
Start: 2022-06-13 | End: 2022-06-13 | Stop reason: SDUPTHER

## 2022-06-13 RX ORDER — HYDROCODONE BITARTRATE AND ACETAMINOPHEN 5; 325 MG/1; MG/1
1 TABLET ORAL ONCE
Status: COMPLETED | OUTPATIENT
Start: 2022-06-13 | End: 2022-06-13

## 2022-06-13 RX ORDER — HYDROCODONE BITARTRATE AND ACETAMINOPHEN 5; 325 MG/1; MG/1
1 TABLET ORAL EVERY 4 HOURS PRN
Qty: 30 TABLET | Refills: 0 | Status: SHIPPED | OUTPATIENT
Start: 2022-06-13 | End: 2022-06-18

## 2022-06-13 RX ORDER — HYDROCODONE BITARTRATE AND ACETAMINOPHEN 5; 325 MG/1; MG/1
TABLET ORAL
Status: COMPLETED
Start: 2022-06-13 | End: 2022-06-13

## 2022-06-13 RX ORDER — GLYCOPYRROLATE 1 MG/5 ML
SYRINGE (ML) INTRAVENOUS PRN
Status: DISCONTINUED | OUTPATIENT
Start: 2022-06-13 | End: 2022-06-13 | Stop reason: SDUPTHER

## 2022-06-13 RX ORDER — CLINDAMYCIN PHOSPHATE 900 MG/50ML
INJECTION INTRAVENOUS
Status: DISCONTINUED
Start: 2022-06-13 | End: 2022-06-13 | Stop reason: HOSPADM

## 2022-06-13 RX ORDER — LABETALOL 20 MG/4 ML (5 MG/ML) INTRAVENOUS SYRINGE
10
Status: DISCONTINUED | OUTPATIENT
Start: 2022-06-13 | End: 2022-06-13 | Stop reason: HOSPADM

## 2022-06-13 RX ORDER — ONDANSETRON 2 MG/ML
4 INJECTION INTRAMUSCULAR; INTRAVENOUS
Status: DISCONTINUED | OUTPATIENT
Start: 2022-06-13 | End: 2022-06-13 | Stop reason: HOSPADM

## 2022-06-13 RX ORDER — CLINDAMYCIN PHOSPHATE 150 MG/ML
INJECTION, SOLUTION INTRAVENOUS PRN
Status: DISCONTINUED | OUTPATIENT
Start: 2022-06-13 | End: 2022-06-13 | Stop reason: SDUPTHER

## 2022-06-13 RX ORDER — BUPIVACAINE HYDROCHLORIDE 5 MG/ML
INJECTION, SOLUTION EPIDURAL; INTRACAUDAL
Status: DISCONTINUED
Start: 2022-06-13 | End: 2022-06-13 | Stop reason: HOSPADM

## 2022-06-13 RX ORDER — SODIUM CHLORIDE, SODIUM LACTATE, POTASSIUM CHLORIDE, CALCIUM CHLORIDE 600; 310; 30; 20 MG/100ML; MG/100ML; MG/100ML; MG/100ML
INJECTION, SOLUTION INTRAVENOUS CONTINUOUS PRN
Status: DISCONTINUED | OUTPATIENT
Start: 2022-06-13 | End: 2022-06-13 | Stop reason: SDUPTHER

## 2022-06-13 RX ADMIN — HYDROCODONE BITARTRATE AND ACETAMINOPHEN 1 TABLET: 5; 325 TABLET ORAL at 08:19

## 2022-06-13 RX ADMIN — CLINDAMYCIN PHOSPHATE 900 MG: 150 INJECTION, SOLUTION INTRAMUSCULAR; INTRAVENOUS at 07:00

## 2022-06-13 RX ADMIN — Medication 0.2 MG: at 07:04

## 2022-06-13 RX ADMIN — PHENYLEPHRINE HYDROCHLORIDE 150 MCG: 10 INJECTION INTRAVENOUS at 07:30

## 2022-06-13 RX ADMIN — ONDANSETRON 4 MG: 2 INJECTION INTRAMUSCULAR; INTRAVENOUS at 07:31

## 2022-06-13 RX ADMIN — PROPOFOL 150 MG: 10 INJECTION, EMULSION INTRAVENOUS at 06:54

## 2022-06-13 RX ADMIN — HYDROMORPHONE HYDROCHLORIDE 0.5 MG: 1 INJECTION, SOLUTION INTRAMUSCULAR; INTRAVENOUS; SUBCUTANEOUS at 08:05

## 2022-06-13 RX ADMIN — LIDOCAINE HYDROCHLORIDE 50 MG: 10 INJECTION, SOLUTION EPIDURAL; INFILTRATION; INTRACAUDAL; PERINEURAL at 06:54

## 2022-06-13 RX ADMIN — PHENYLEPHRINE HYDROCHLORIDE 100 MCG: 10 INJECTION INTRAVENOUS at 07:05

## 2022-06-13 RX ADMIN — SODIUM CHLORIDE, POTASSIUM CHLORIDE, SODIUM LACTATE AND CALCIUM CHLORIDE: 600; 310; 30; 20 INJECTION, SOLUTION INTRAVENOUS at 06:50

## 2022-06-13 RX ADMIN — PHENYLEPHRINE HYDROCHLORIDE 100 MCG: 10 INJECTION INTRAVENOUS at 07:22

## 2022-06-13 RX ADMIN — Medication 0.5 MG: at 08:05

## 2022-06-13 RX ADMIN — PHENYLEPHRINE HYDROCHLORIDE 100 MCG: 10 INJECTION INTRAVENOUS at 07:10

## 2022-06-13 RX ADMIN — DEXAMETHASONE SODIUM PHOSPHATE 10 MG: 10 INJECTION INTRAMUSCULAR; INTRAVENOUS at 07:00

## 2022-06-13 RX ADMIN — PHENYLEPHRINE HYDROCHLORIDE 150 MCG: 10 INJECTION INTRAVENOUS at 07:16

## 2022-06-13 RX ADMIN — FENTANYL CITRATE 100 MCG: 50 INJECTION, SOLUTION INTRAMUSCULAR; INTRAVENOUS at 06:54

## 2022-06-13 ASSESSMENT — ENCOUNTER SYMPTOMS
VOMITING: 0
NAUSEA: 0
SORE THROAT: 0
COUGH: 0
RHINORRHEA: 0
SHORTNESS OF BREATH: 0
CHEST TIGHTNESS: 0

## 2022-06-13 ASSESSMENT — PAIN DESCRIPTION - LOCATION: LOCATION: FOOT

## 2022-06-13 ASSESSMENT — PAIN DESCRIPTION - ORIENTATION: ORIENTATION: LEFT

## 2022-06-13 ASSESSMENT — PAIN - FUNCTIONAL ASSESSMENT: PAIN_FUNCTIONAL_ASSESSMENT: NONE - DENIES PAIN

## 2022-06-13 ASSESSMENT — PAIN SCALES - GENERAL
PAINLEVEL_OUTOF10: 6
PAINLEVEL_OUTOF10: 7
PAINLEVEL_OUTOF10: 8

## 2022-06-13 NOTE — H&P
History and Physical  Podiatric Medicine and Surgery     Subjective     Chief Complaint: left 2nd toe     HPI:  Tj Epstein is 69 y.o.,  female, undergoing for hammertoe of left 2nd toe. Pt is being seen for hx of:  toe pain. Patient has had previous arthroplasty done. Patient denies any FH of Esophogeal Cancer. Patient complains of frequent heartburn, more and more.        Pt has hx of GERD  and is taking PPI/  Antacid. Patient reports  Dysphagia, she states that she was in the hospital recently using Thicken with her foods.    help. Pt admits to regurgitation.       Patient complains of RLQ pains   no nausea and no vomiting . Patient denies any indigestion, difficulty swallowing  or heartburn. Pt complains of  Constipation and has a protrusion in anal area with BM, no changes in the color, caliber or consistency of the stools. PCP is LULÚ Cloud - CNP    ROS:   Review of Systems   Constitutional: Negative for chills, fatigue and fever. HENT: Negative for rhinorrhea and sore throat. Eyes: Negative for visual disturbance. Respiratory: Negative for cough, chest tightness and shortness of breath. Cardiovascular: Negative for chest pain and palpitations. Gastrointestinal: Negative for nausea and vomiting. Musculoskeletal: Negative for arthralgias and myalgias. Skin: Negative for wound. Neurological: Negative for weakness, numbness and headaches. Psychiatric/Behavioral: Negative for agitation and confusion.        Past Medical History   has a past medical history of Abdominal pain, Abnormal EKG, Anemia, Anesthesia, Arthritis, Asthma, Benign hypertension with CKD (chronic kidney disease) stage III ([facility] [address] CKD (chronic kidney disease) stage 3, GFR 30-59 ml/min ([facility]), GERD (gastroesophageal reflux disease), History of blood transfusion, Hyperlipidemia, Hypokalemia, Hypothyroidism, Murmur, MVP (mitral valve prolapse), Rectal bleeding, Scarlet fever, Sjogren - Ragini's syndrome, and Wears glasses. Past Surgical History   has a past surgical history that includes Hysterectomy; Neck surgery; Spine surgery; Colonoscopy (01/22/16 ); Upper gastrointestinal endoscopy (2007); Elbow surgery (Left); Rectal prolapse repair; Eye surgery; knee surgery (Left); knee surgery; Tonsillectomy; Bladder surgery; shoulder surgery (Bilateral); other surgical history; Tubal ligation; Dental surgery; Appendectomy; Hammer toe surgery (Left, 11/20/2020); back surgery; blepharoplasty; fracture surgery; Upper gastrointestinal endoscopy; skin biopsy; Breast surgery (Left); eye surgery; and Upper gastrointestinal endoscopy (N/A, 6/8/2022). Medications  Prior to Admission medications    Medication Sig Start Date End Date Taking? Authorizing Provider   Dextran 70-Hypromellose (LUBRICANT EYE DROPS, PF, OP) Apply to eye    Historical Provider, MD LI Place 2 drops into both eyes every 4 hours as needed (moisturizing eye drop)    Historical Provider, MD   CRANBERRY PO Take 1 tablet by mouth daily    Historical Provider, MD   budesonide-formoterol (SYMBICORT) 80-4.5 MCG/ACT AERO Inhale 2 puffs into the lungs 2 times daily 5/24/22   Historical Provider, MD   docusate sodium (COLACE) 100 mg capsule TAKE 1 CAPSULE BY MOUTH TWO TIMES A DAY AS NEEDED FOR CONSTIPATION 5/1/22   Historical Provider, MD   furosemide (LASIX) 20 MG tablet daily  5/16/22   Historical Provider, MD   mupirocin (BACTROBAN) 2 % cream Apply 2g topically daily. 4/26/22   Waldemar Elam DPM   levothyroxine (SYNTHROID) 100 MCG tablet Take 1 tablet by mouth Daily  Patient taking differently: Take 88 mcg by mouth Daily  4/8/22   Dorrine MD Jose C   ARIPiprazole (ABILIFY) 5 MG tablet Take 5 mg by mouth daily    Historical Provider, MD   gabapentin (NEURONTIN) 300 MG capsule Take 100 mg by mouth daily. 4/6/20   Historical Provider, MD   fluocinonide (LIDEX) 0.05 % cream Apply topically 2 times daily.  4/27/20   Nneka SELBY Everton Naranjo DPM   albuterol sulfate  (90 Base) MCG/ACT inhaler Inhale 2 puffs into the lungs 7/15/19   Historical Provider, MD   calcium carbonate (OYSTER SHELL CALCIUM 500 MG) 1250 (500 Ca) MG tablet Take 1,250 mg by mouth    Historical Provider, MD   Multiple Vitamins-Minerals (EYE VITAMINS & MINERALS) TABS Eye Vitamin and Minerals    Historical Provider, MD   fenofibrate (TRICOR) 145 MG tablet TAKE 1 TABLET BY MOUTH ONE TIME A DAY WITH a MEAL 7/31/19   Historical Provider, MD   hydrOXYzine (ATARAX) 10 MG tablet Take 10 mg by mouth daily as needed  11/20/18   Historical Provider, MD Sanchez Mangle Oil 500 MG CAPS Take 1 capsule by mouth    Historical Provider, MD   omega-3 acid ethyl esters (LOVAZA) 1 g capsule Take 2 g by mouth    Historical Provider, MD   potassium chloride (KLOR-CON M) 10 MEQ extended release tablet Take 10 mEq by mouth 2 times daily 2 tablets in am & pm 5/10/19   Historical Provider, MD   Skin Protectants, Misc. (EUCERIN) cream Apply topically 7/31/19   Historical Provider, MD   glucosamine-chondroitin 500-400 MG tablet Take 1 tablet by mouth    Historical Provider, MD   conjugated estrogens (PREMARIN) 0.625 MG/GM vaginal cream Place 0.5 g vaginally twice a week 10/6/16   Tatiana Cruz MD   vilazodone HCl (VILAZODONE HCL) 40 MG TABS Take 40 mg by mouth daily    Historical Provider, MD   L-Lysine 500 MG CAPS Take by mouth daily     Historical Provider, MD   Multiple Vitamins-Minerals (THERAPEUTIC MULTIVITAMIN-MINERALS) tablet Take 1 tablet by mouth daily. Historical Provider, MD   HYDROcodone-acetaminophen (NORCO) 5-325 MG per tablet Take 1 tablet by mouth daily as needed. 7/20/13   Historical Provider, MD   SUMAtriptan (IMITREX) 100 MG tablet Take 100 mg by mouth once as needed. Historical Provider, MD   zolpidem (AMBIEN) 10 MG tablet Take 10 mg by mouth nightly as needed. Historical Provider, MD   atorvastatin (LIPITOR) 40 MG tablet Take 40 mg by mouth daily.     Historical Provider, MD   montelukast (SINGULAIR) 10 MG tablet Take 10 mg by mouth nightly. Historical Provider, MD    Scheduled Meds:   lidocaine        bupivacaine (PF)        sodium chloride (PF)        clindamycin         Continuous Infusions:  PRN Meds:. Allergies  is allergic to latex, nickel, benadryl [diphenhydramine], cefadroxil, cyclosporine, demerol hcl [meperidine], erythromycin, keflex [cephalexin], lansoprazole, lomefloxacin, loracarbef, meperidine hcl, other, percocet [oxycodone-acetaminophen], pneumococcal vaccines, seroquel [quetiapine fumarate], and adhesive tape. Family History  family history includes Heart Disease in her mother; Stroke in her father. Social History   reports that she has never smoked. She has never used smokeless tobacco.   reports no history of alcohol use. reports no history of drug use. Objective     Vitals:  Patient Vitals for the past 8 hrs:   BP Temp Temp src Pulse Resp SpO2 Weight   22 0601 (!) 156/77 97.3 °F (36.3 °C) Infrared 86 18 99 % 126 lb 6 oz (57.3 kg)     Average, Min, and Max for last 24 hours Vitals:  TEMPERATURE:  Temp  Av.3 °F (36.3 °C)  Min: 97.3 °F (36.3 °C)  Max: 97.3 °F (36.3 °C)    RESPIRATIONS RANGE: Resp  Av  Min: 18  Max: 18    PULSE RANGE: Pulse  Av  Min: 86  Max: 86    BLOOD PRESSURE RANGE:  Systolic (45BSQ), DARCI:595 , Min:156 , ABIMBOLA:012   ; Diastolic (08OAG), VXC:09, Min:77, Max:77      PULSE OXIMETRY RANGE: SpO2  Av %  Min: 99 %  Max: 99 %  I&O:  No intake/output data recorded. CBC:  No results for input(s): WBC, HGB, HCT, PLT, CRP in the last 72 hours. Invalid input(s):  ESR     BMP:  No results for input(s): NA, K, CL, CO2, BUN, CREATININE, GLUCOSE, CALCIUM in the last 72 hours. Coags:  No results for input(s): APTT, PROT, INR in the last 72 hours.     No results found for: LABA1C  No results found for: SEDRATE  No results found for: CRP     GENERAL APPEARANCE:   Zhang Emmanuel is 69 y.o., female, moderately obese, nourished, conscious, alert. Does not appear to be distress or pain at this time. SKIN:  Warm, dry, no cyanosis or jaundice. HEAD:  Normocephalic, atraumatic, no swelling or tenderness. EYES:  Pupils equal, reactive to light. EARS:  No discharge, no marked hearing loss. NOSE:  No rhinorrhea, epistaxis or septal deformity. THROAT:  Not congested. No ulceration bleeding or discharge. NECK:  No stiffness, trachea central.  No palpable masses or L.N.                 CHEST:  Symmetrical and equal on expansion. HEART:  RRR . No audible murmurs or gallops. LUNGS:  Equal on expansion, normal breath sounds. No adventitious sounds. ABDOMEN:  Obese. Soft on palpation. No dysphagia, No localized tenderness. No guarding or rigidity. LYMPHATICS:  No palpable cervical lymphadenopathy.      LOCOMOTOR, BACK AND SPINE:  No tenderness or deformities. EXTREMITIES:  Symmetrical, no pretibial edema. No discoloration or ulcerations.     NEUROLOGIC:  The patient is conscious, alert, oriented,Cranial nerve II-XII intact, taste and smell were not examined. No apparent focal sensory or motor deficits. Sotero Garcia is a 69 y.o. female with   Hammertoe left 2nd digit    Active Problems:    * No active hospital problems. *  Resolved Problems:    * No resolved hospital problems. *        Plan     Left 2nd toe arthroplasty    Zoey Aguilera DPM   Podiatric Medicine & Surgery   6/13/2022 at 6:50 AM    This note is created with the assistance of a speech recognition program.  While intending to generate a document that actually reflects the content of the visit, the document can still have some errors including those of syntax and sound a like substitutions which may escape proof reading.   In such instances, actual meaning can be extrapolated by contextual diversion.

## 2022-06-13 NOTE — ANESTHESIA PRE PROCEDURE
Department of Anesthesiology  Preprocedure Note       Name:  Krysten Lau   Age:  69 y.o.  :  1955                                          MRN:  [id number]         Date:  2022      Surgeon: Ivis Armenta):  Lavella Harada, DPM    Procedure: Procedure(s):  LEFT 2ND TOE HAMMER REPAIR    Medications prior to admission:   Prior to Admission medications    Medication Sig Start Date End Date Taking? Authorizing Provider   Dextran 70-Hypromellose (LUBRICANT EYE DROPS, PF, OP) Apply to eye    Historical Provider, MD LI Place 2 drops into both eyes every 4 hours as needed (moisturizing eye drop)    Historical Provider, MD   CRANAIDA PO Take 1 tablet by mouth daily    Historical Provider, MD   budesonide-formoterol (SYMBICORT) 80-4.5 MCG/ACT AERO Inhale 2 puffs into the lungs 2 times daily 22   Historical Provider, MD   docusate sodium (COLACE) 100 mg capsule TAKE 1 CAPSULE BY MOUTH TWO TIMES A DAY AS NEEDED FOR CONSTIPATION 22   Historical Provider, MD   furosemide (LASIX) 20 MG tablet daily  22   Historical Provider, MD   mupirocin (BACTROBAN) 2 % cream Apply 2g topically daily. 22   Lavella Harada, DPM   levothyroxine (SYNTHROID) 100 MCG tablet Take 1 tablet by mouth Daily  Patient taking differently: Take 88 mcg by mouth Daily  22   Hattie Bence, MD   ARIPiprazole (ABILIFY) 5 MG tablet Take 5 mg by mouth daily    Historical Provider, MD   gabapentin (NEURONTIN) 300 MG capsule Take 100 mg by mouth daily. 20   Historical Provider, MD   fluocinonide (LIDEX) 0.05 % cream Apply topically 2 times daily.  20   Lavella Harada, DPM   albuterol sulfate  (90 Base) MCG/ACT inhaler Inhale 2 puffs into the lungs 7/15/19   Historical Provider, MD   calcium carbonate (OYSTER SHELL CALCIUM 500 MG) 1250 (500 Ca) MG tablet Take 1,250 mg by mouth    Historical Provider, MD   Multiple Vitamins-Minerals (EYE VITAMINS & MINERALS) TABS Eye Vitamin and Minerals Historical Provider, MD   fenofibrate (TRICOR) 145 MG tablet TAKE 1 TABLET BY MOUTH ONE TIME A DAY WITH a MEAL 7/31/19   Historical Provider, MD   hydrOXYzine (ATARAX) 10 MG tablet Take 10 mg by mouth daily as needed  11/20/18   Historical Provider, MD Hayador Stain Oil 500 MG CAPS Take 1 capsule by mouth    Historical Provider, MD   omega-3 acid ethyl esters (LOVAZA) 1 g capsule Take 2 g by mouth    Historical Provider, MD   potassium chloride (KLOR-CON M) 10 MEQ extended release tablet Take 10 mEq by mouth 2 times daily 2 tablets in am & pm 5/10/19   Historical Provider, MD   Skin Protectants, Misc. (EUCERIN) cream Apply topically 7/31/19   Historical Provider, MD   glucosamine-chondroitin 500-400 MG tablet Take 1 tablet by mouth    Historical Provider, MD   conjugated estrogens (PREMARIN) 0.625 MG/GM vaginal cream Place 0.5 g vaginally twice a week 10/6/16   Grady Hinkle MD   vilazodone HCl (VILAZODONE HCL) 40 MG TABS Take 40 mg by mouth daily    Historical Provider, MD   L-Lysine 500 MG CAPS Take by mouth daily     Historical Provider, MD   Multiple Vitamins-Minerals (THERAPEUTIC MULTIVITAMIN-MINERALS) tablet Take 1 tablet by mouth daily. Historical Provider, MD   HYDROcodone-acetaminophen (NORCO) 5-325 MG per tablet Take 1 tablet by mouth daily as needed. 7/20/13   Historical Provider, MD   SUMAtriptan (IMITREX) 100 MG tablet Take 100 mg by mouth once as needed. Historical Provider, MD   zolpidem (AMBIEN) 10 MG tablet Take 10 mg by mouth nightly as needed. Historical Provider, MD   atorvastatin (LIPITOR) 40 MG tablet Take 40 mg by mouth daily. Historical Provider, MD   montelukast (SINGULAIR) 10 MG tablet Take 10 mg by mouth nightly.     Historical Provider, MD       Current medications:    Current Facility-Administered Medications   Medication Dose Route Frequency Provider Last Rate Last Admin    lidocaine 1 % injection             bupivacaine (PF) (MARCAINE) 0.5 % injection Allergies: Allergies   Allergen Reactions    Latex Rash     Very sensitive to latex bandaids/ adhesive dressings    Nickel Rash          Benadryl [Diphenhydramine]     Cefadroxil Itching    Cyclosporine Other (See Comments)     burning    Demerol Hcl [Meperidine] Hallucinations    Erythromycin      Other reaction(s): Unknown (qualifier value)    Keflex [Cephalexin] Itching    Lansoprazole      Other reaction(s): Unknown (qualifier value)/ doesn't recall reaction    Lomefloxacin      Other reaction(s): Unknown (qualifier value)    Loracarbef Itching     Other reaction(s): Unknown (qualifier value)    Meperidine Hcl      Other reaction(s): Hallucinations    Other Other (See Comments) and Itching    Percocet [Oxycodone-Acetaminophen]     Pneumococcal Vaccines Other (See Comments)    Seroquel [Quetiapine Fumarate] Other (See Comments)     Uncontrollable facial /mouth movements    Adhesive Tape Rash       Problem List:    Patient Active Problem List   Diagnosis Code    Menopause Z78.0    Rectal bleeding K62.5    Abdominal pain R10.9    Gastroesophageal reflux disease without esophagitis K21.9    Lower abdominal pain R10.30    Constipation K59.00    Rectal prolapse K62.3    Benign hypertension with CKD (chronic kidney disease) stage III ([facility]) I12.9, N18.30    CKD (chronic kidney disease) stage 3, GFR 30-59 ml/min ([facility]) N18.30    Hammer toe of left foot M20.42    Pain in toe of left foot M79.675    Bone cyst of foot M85.679    Digital mucinous cyst of toe of left foot M67.472    AMS (altered mental status) R41.82    Hypokalemia E87.6    Hypothyroidism E03.9    Iron deficiency anemia D50.9    Acute cystitis without hematuria N30.00       Past Medical History:        Diagnosis Date    Abdominal pain 04/04/2022    Cannot rule out Anerior infarct, when compared with EKG on 11- QT has lengthened.     Abnormal EKG 04/04/2022    Anemia     Anesthesia     raw throat after procedures except for last 3. Used smaller tube for intubation.     Arthritis     Asthma     Benign hypertension with CKD (chronic kidney disease) stage III ([facility])     CKD (chronic kidney disease) stage 3, GFR 30-59 ml/min ([facility])     GERD (gastroesophageal reflux disease)     History of blood transfusion     x1    Hyperlipidemia     Hypokalemia     Hypothyroidism     Murmur     MVP (mitral valve prolapse)     Rectal bleeding     Scarlet fever     age 7    Sjogren - Ragini's syndrome     dry eyes, dry mouth    Wears glasses        Past Surgical History:        Procedure Laterality Date    APPENDECTOMY      BACK SURGERY       7 fusions  thoracic-lumbar-cervical involvement    BLADDER SURGERY      Bladder and rectum reconstruction    BLEPHAROPLASTY      upper    BREAST SURGERY Left     biopsy (negative)    COLONOSCOPY  01/22/16     RECALL 10 YRS , RECTAL SCAR BIOPSY COLONIC MUCOSA WITH FOCAL ULCER AND GRANULATION TISSUE     DENTAL SURGERY      complete teeth extraction    ELBOW SURGERY Left     EYE SURGERY      bilateral cataract    EYE SURGERY      cataracts,     FRACTURE SURGERY      HAMMER TOE SURGERY Left 11/20/2020    TOE HAMMER REPAIR 2ND AND 3RD, DIPJ W/BONE BIOPSY OF 3RD performed by Solomon Cervantes DPM at [facility] ([address] [place] [address])      KNEE SURGERY Left      meniscectomy    KNEE SURGERY      right knee meniscectomy    NECK SURGERY      hardware    OTHER SURGICAL HISTORY      lower back    RECTAL PROLAPSE REPAIR      x2    SHOULDER SURGERY Bilateral     RTC repairs    SKIN BIOPSY      SPINE SURGERY      TONSILLECTOMY      TUBAL LIGATION      bilateral    UPPER GASTROINTESTINAL ENDOSCOPY  2007    funal gastric polyp     UPPER GASTROINTESTINAL ENDOSCOPY      bleedig ulcer    UPPER GASTROINTESTINAL ENDOSCOPY N/A 6/8/2022    EGD BIOPSY performed by Mili Wallace MD at [facility]       Social History:    Social History     Tobacco Use    Smoking status: Never Smoker    Smokeless tobacco: Never Used   Substance Use Topics    Alcohol use: Never                                Counseling given: Not Answered      Vital Signs (Current):   Vitals:    06/13/22 0601   BP: (!) 156/77   Pulse: 86   Resp: 18   Temp: 97.3 °F (36.3 °C)   TempSrc: Infrared   SpO2: 99%   Weight: 126 lb 6 oz (57.3 kg)                                              BP Readings from Last 3 Encounters:   06/13/22 (!) 156/77   06/08/22 133/65   05/31/22 (!) 143/88       NPO Status: Time of last liquid consumption: 0430                        Time of last solid consumption: 2300                        Date of last liquid consumption: 06/13/22                        Date of last solid food consumption: 06/12/22    BMI:   Wt Readings from Last 3 Encounters:   06/13/22 126 lb 6 oz (57.3 kg)   06/08/22 122 lb (55.3 kg)   06/01/22 122 lb (55.3 kg)     Body mass index is 24.68 kg/m². CBC:   Lab Results   Component Value Date    WBC 3.8 04/07/2022    RBC 3.94 04/07/2022    HGB 10.9 04/07/2022    HCT 32.9 04/07/2022    MCV 83.7 04/07/2022    RDW 13.7 04/07/2022     04/07/2022       CMP:   Lab Results   Component Value Date     04/07/2022    K 4.0 04/07/2022     04/07/2022    CO2 23 04/07/2022    BUN 17 04/07/2022    CREATININE 1.32 05/26/2022    CREATININE 0.85 04/07/2022    GFRAA >60 04/07/2022    LABGLOM 40 05/26/2022    GLUCOSE 110 04/07/2022    PROT 6.3 04/07/2022    CALCIUM 9.0 04/07/2022    BILITOT 0.22 04/07/2022    ALKPHOS 82 04/07/2022    AST 24 04/07/2022    ALT 15 04/07/2022       POC Tests: No results for input(s): POCGLU, POCNA, POCK, POCCL, POCBUN, POCHEMO, POCHCT in the last 72 hours.     Coags: No results found for: PROTIME, INR, APTT    HCG (If Applicable): No results found for: PREGTESTUR, PREGSERUM, HCG, HCGQUANT     ABGs: No results found for: PHART, PO2ART, PQP6QMU, STL6ZWB, BEART, Y5CYWQMF     Type & Screen (If Applicable):  No results found for: LABABO, [address]    Drug/Infectious Status (If Applicable):  No results found for: HIV, HEPCAB    COVID-19 Screening (If Applicable):   Lab Results   Component Value Date    COVID19 Not Detected 04/04/2022    COVID19 Not Detected 11/16/2020           Anesthesia Evaluation  Patient summary reviewed and Nursing notes reviewed  Airway: Mallampati: I  TM distance: >3 FB   Neck ROM: full  Mouth opening: > = 3 FB   Dental:    (+) edentulous      Pulmonary:normal exam    (+) asthma:                           ROS comment: -ASTHMA WELL CONTROLLED - INFREQUENT ALBUTEROL USE   Cardiovascular:    (+) valvular problems/murmurs: MVP,       ECG reviewed                     ROS comment: -HX OF SCARLET FEVER - AGE 8  -MVP WITH MURMUR - ASYMPTOMATIC  -EKG - SR @ 89     Neuro/Psych:   Negative Neuro/Psych ROS              GI/Hepatic/Renal:   (+) GERD: well controlled,           Endo/Other:    (+) hypothyroidism: arthritis:., .                  ROS comment: -NPO AFTER MIDNIGHT  -ALLERGIES - SEE EPIC Abdominal:             Vascular:           ROS comment: -ANEMIA. Other Findings:           Anesthesia Plan      general     ASA 3     (LMA - HX OF SORE THROAT IN PAST, CONSIDER SMALLER LMA)  Induction: intravenous. MIPS: Postoperative opioids intended and Prophylactic antiemetics administered. Anesthetic plan and risks discussed with patient. Plan discussed with CRNA.     Attending anesthesiologist reviewed and agrees with Lakia Harrison MD   6/13/2022

## 2022-06-13 NOTE — OP NOTE
Operative Note      Patient: Yumiko Miguel  YOB: 1955  MRN: [id number]    Date of Procedure: 6/13/2022    Pre-Op Diagnosis: M20.42  HAMMER TOE left second toe  Pain toe left foot    Post-Op Diagnosis: Same       Procedure(s):  LEFT 2ND TOE HAMMER REPAIR    Surgeon(s):  Kenisha Steiner DPM    Assistant:  * No surgical staff found *    Anesthesia: General    Estimated Blood Loss (mL): Minimal    Complications: None    Specimens:   * No specimens in log *    Implants:  Implant Name Type Inv. Item Serial No.  Lot No. LRB No. Used Action   K WIRE FIX L6IN DIA0.062IN [id number] [facility] [employer]] - [id number]  K WIRE FIX L6IN DIA0.062IN [[id number]] [[employer]]  [facility] [employer]WD [id number] Left 1 Implanted         Drains: * No LDAs found *    Findings: See Op Note. Plantar flexed left 2nd digit at the IPJ and MPJ. INDICATION FOR PROCEDURE: The patient has had a contracted left second digit for quite some time. The patient has failed conservative therapy, and elects to undergo surgical correction at this time. All risks and benefits were discussed with the patient in detail, no guarantees were given or implied. Consent obtained and placed in the chart. PROCEDURE IN DETAIL: The patient was transported from pre-op to the operating room and placed on the operating table in the supine position with a safety strap across the lap. A pneumatic ankle tourniquet was applied to the left ankle. After adequate sedation by the Anesthesia, a block of 10cc of a 1:1 mixture of 1% lidocaine plain and 0.5% marcaine plain was injected. The foot was then prepped and draped in the usual aseptic fashion. The foot was then exsanguinated with an Esmarch bandage. The pneumatic ankle tourniquet was inflated to 250 mmHg.      Attention was then directed to the left second digit where a linear incision was made over the left second toe metatarsal Minor Austin phalanx to the DIPJ. The incision was deepened using sharp dissection until the extensor tendon and PIPJ were identified. The extensor tend was transected at the level of the PIPJ. Then extensor tendon, capsule, and periosteum were reflected revealing the head of the proximal phalanx. The head of the proximal phalanx was partially resected into a peg in hole fashion with a hole made in the distal phalanx correlating to the PEG created in the distal middle phalanx using a sagital saw and bur and all prominent edges were smoothed with a power rasp. Next the extensor king apparatus was released at the MPJ and a McGlamery elevator was utilized to free the metatarsal head of all surrounding tissues. Following visual verification that the toe was in a rectus position a retrograde K wire was placed through the arthroplasty site and into the metatarsal for stabilization. The surgical site was irrigated with copious amounts of saline and the extensor tendon was repaired using 4-0 vicryl. The surgical incision was then closed in layers using 4-0 Vicryl and 5-0 Vicryl. Anatomic alignment of the second digit was noted. The pneumatic ankle tourniquet was released and immediate hyperemic flush was noted to all five digits of the left foot. Dressings consisted of adaptic, 4 x 4s, Kerlix and an Ace bandage. The patient tolerated the above procedure and anesthesia well without complications. The patient was transported from the operating room to the PACU with vital signs stable and vascular status intact to the left foot. The patient is to follow up with Dr. Rose Stephenson within 1 week.     Electronically signed by Gonzalo Franco DPM on 6/13/2022 at 7:44 AM

## 2022-06-13 NOTE — ANESTHESIA POSTPROCEDURE EVALUATION
Department of Anesthesiology  Postprocedure Note    Patient: Gianna Donnelly  MRN: [id number]  [place]: 1955  Date of evaluation: 6/13/2022  Time:  8:05 AM     Procedure Summary     Date: 06/13/22 Room / Location: [facility] OR 01 / [facility]    Anesthesia Start: 5235 Anesthesia Stop: 4318    Procedure: LEFT 2ND TOE HAMMER REPAIR (Left Toes) Diagnosis:       Hammer toe of second toe of left foot      (M20.42  HAMMER TOE)    Surgeons: Austyn Zafar DPM Responsible Provider: Andrei Archibald MD    Anesthesia Type: general ASA Status: 3          Anesthesia Type: No value filed. Soto Phase I: Soto Score: 6    Soto Phase II:      Last vitals: Reviewed and per EMR flowsheets.        Anesthesia Post Evaluation    Patient location during evaluation: PACU  Patient participation: complete - patient participated  Level of consciousness: awake and alert  Airway patency: patent  Nausea & Vomiting: no nausea and no vomiting  Complications: no  Cardiovascular status: hemodynamically stable  Respiratory status: nasal cannula and spontaneous ventilation  Hydration status: euvolemic  Multimodal analgesia pain management approach

## 2022-06-13 NOTE — BRIEF OP NOTE
Brief Postoperative Note      Patient: Denys Alexandra  YOB: 1955  MRN: [id number]    Date of Procedure: 6/13/2022    Pre-Op Diagnosis: M20.42  HAMMER TOE    Post-Op Diagnosis: Same       Procedure(s):  LEFT 2ND TOE HAMMER REPAIR    Surgeon(s):  Pardeep Stearns DPM    Assistant:  * No surgical staff found *    Anesthesia: General    Estimated Blood Loss (mL): Minimal    Complications: None    Specimens:   * No specimens in log *    Implants:  Implant Name Type Inv. Item Serial No.  Lot No. LRB No. Used Action   K WIRE FIX L6IN DIA0.062IN [id number] [facility] [employer]] - [id number]  K WIRE FIX L6IN DIA0.062IN [[id number]] [[employer]]  [facility] [employer]WD [id number] Left 1 Implanted         Drains: * No LDAs found *    Findings: See Op Note. Plantar flexed left 2nd digit at the IPJ and MPJ.     Electronically signed by Boogie Waters DPM on 6/13/2022 at 7:44 AM

## 2022-06-21 ENCOUNTER — OFFICE VISIT (OUTPATIENT)
Dept: PODIATRY | Age: 67
End: 2022-06-21

## 2022-06-21 VITALS — BODY MASS INDEX: 24.74 KG/M2 | WEIGHT: 126 LBS | HEIGHT: 60 IN

## 2022-06-21 DIAGNOSIS — M20.42 HAMMER TOE OF LEFT FOOT: Primary | ICD-10-CM

## 2022-06-21 DIAGNOSIS — Z98.890 POST-OPERATIVE STATE: ICD-10-CM

## 2022-06-21 PROCEDURE — 99024 POSTOP FOLLOW-UP VISIT: CPT | Performed by: PODIATRIST

## 2022-06-21 ASSESSMENT — ENCOUNTER SYMPTOMS
CONSTIPATION: 0
VOMITING: 0
NAUSEA: 0
BACK PAIN: 1
COLOR CHANGE: 0
DIARRHEA: 0

## 2022-06-21 NOTE — PROGRESS NOTES
[address]  Return Patient    Chief Complaint   Patient presents with    Post-Op Check     post op initial left        Kayli De Leon is a 69y.o. year old female who is here for post-op visit of left foot hammertoe repair of left 2nd toe (DOS 6/13/22). Reports pain is controlled at this time. Denies any trauma to the area. Has been compliant in surgical shoe, weight bearing with cane for ambulation. Denies any further pedal complaints at this time. Denies N/V/F/SOB/CP. Review of Systems   Constitutional: Negative for chills, diaphoresis, fatigue, fever and unexpected weight change. Cardiovascular: Positive for leg swelling. Gastrointestinal: Negative for constipation, diarrhea, nausea and vomiting. Musculoskeletal: Positive for arthralgias, back pain and gait problem. Negative for joint swelling. Skin: Negative for color change, pallor, rash and wound. Neurological: Negative for weakness and numbness. Vascular: DP and PT pulses palpable 2/4, Right Foot and 2/4 on the Left Foot. CFT <3 seconds, Right Foot and <3 seconds on the Left Foot. Edema is present b/l LE non-pitting,  Right Foot and presenton the Left Foot, minimal today. Varicosities present bilateral feet, ankles, legs. Neurological:   Sensation present  to light touch to level of digits, both feet. Musculoskeletal:   Muscle strength is 5/5 on the Right Foot and 5/5 on the Left Foot. Structural deformities are absent on the Right Foot and absent on the Left Foot. Pain to palpation in general to left toes 2nd, PIPJ. Minimal edema dorsal left foot. Integument:  Warm, dry, supple both feet. Sutures and K-wire pin intact to left foot 2nd digit, no signs of dehiscence, drainage, cellulitis, or acute signs of infection appreciated. Mild edema noted to left foot    Radiographs:  Three weightbearing views (AP, Oblique, and Lateral) of the left foot were obtained in the office today and reviewed, revealing no acute fracture, dislocation, or radioopaque foreign body/tumor. Overall alignment is satisfactory. Correction of the second toe with fusion of the proximal interphalangeal joint with pin fixation        Electronically signed by Maikel Sandoval DPM           Assesment :    Diagnosis Orders   1. Hammer toe of left foot  XR FOOT LEFT (MIN 3 VIEWS)   2. Post-operative state  XR FOOT LEFT (MIN 3 VIEWS)         Plan: Pt was evaluated and examined. Patient was given personalized discharge instructions. Pt will follow up in as needed or sooner if any problems arise. Diagnosis was discussed with the pt and all of their questions were answered in detail. Proper foot hygiene and care was discussed with the pt. Patient to check feet daily and contact the office with any questions/problems/concerns. Other comorbidity noted and will be managed by PCP. Continue weight bearing as tolerated in surgical shoe. Sutures left intact due to pain and swelling. DSD, Ace, surgical shoe applied to left foot. Instructed patient to keep dressings clean, dry, intact until next post-op visit. Follow up in 1 week    No orders of the defined types were placed in this encounter.

## 2022-06-28 ENCOUNTER — OFFICE VISIT (OUTPATIENT)
Dept: PODIATRY | Age: 67
End: 2022-06-28

## 2022-06-28 VITALS — BODY MASS INDEX: 24.74 KG/M2 | WEIGHT: 126 LBS | HEIGHT: 60 IN

## 2022-06-28 DIAGNOSIS — Z98.890 POST-OPERATIVE STATE: ICD-10-CM

## 2022-06-28 DIAGNOSIS — M20.42 HAMMER TOE OF LEFT FOOT: Primary | ICD-10-CM

## 2022-06-28 PROCEDURE — 99024 POSTOP FOLLOW-UP VISIT: CPT | Performed by: PODIATRIST

## 2022-06-28 ASSESSMENT — ENCOUNTER SYMPTOMS
VOMITING: 0
BACK PAIN: 1
COLOR CHANGE: 0
CONSTIPATION: 0
DIARRHEA: 0
NAUSEA: 0

## 2022-06-28 NOTE — PROGRESS NOTES
[address]  Return Patient    Chief Complaint   Patient presents with    Post-Op Check     post op left foot        Raul Moise is a 69y.o. year old female who is here 2 weeks post-op visit of left foot hammertoe repair of left 2nd toe (DOS 6/13/22). Reports pain is controlled at this time. Denies any trauma to the area. Has been compliant in surgical shoe, weight bearing with cane for ambulation. Denies any further pedal complaints at this time. Denies N/V/F/SOB/CP. Review of Systems   Constitutional: Negative for chills, diaphoresis, fatigue, fever and unexpected weight change. Cardiovascular: Positive for leg swelling. Gastrointestinal: Negative for constipation, diarrhea, nausea and vomiting. Musculoskeletal: Positive for arthralgias, back pain and gait problem. Negative for joint swelling. Skin: Negative for color change, pallor, rash and wound. Neurological: Negative for weakness and numbness. Vascular: DP and PT pulses palpable 2/4, Right Foot and 2/4 on the Left Foot. CFT <3 seconds, Right Foot and <3 seconds on the Left Foot. Edema is present b/l LE non-pitting,  Right Foot and presenton the Left Foot, minimal today. Varicosities present bilateral feet, ankles, legs. Neurological:   Sensation present  to light touch to level of digits, both feet. Musculoskeletal:   Muscle strength is 5/5 on the Right Foot and 5/5 on the Left Foot. Structural deformities are absent on the Right Foot and absent on the Left Foot. Pain to palpation in general to left toes 2nd, PIPJ. Minimal edema dorsal left foot. Integument:  Warm, dry, supple both feet. Sutures and K-wire pin intact to left foot 2nd digit, no signs of dehiscence, drainage, cellulitis, or acute signs of infection appreciated. Mild edema noted to left foot    Radiographs:  Three weightbearing views (AP, Oblique, and Lateral) of the left foot were obtained in the office today and reviewed, revealing no acute fracture, dislocation, or radioopaque foreign body/tumor. Overall alignment is satisfactory. Correction of the second toe with fusion of the proximal interphalangeal joint with pin fixation        Electronically signed by Fabiola Hayward DPM           Assesment :    Diagnosis Orders   1. Hammer toe of left foot     2. Post-operative state           Plan: Pt was evaluated and examined. Patient was given personalized discharge instructions. Pt will follow up in as needed or sooner if any problems arise. Diagnosis was discussed with the pt and all of their questions were answered in detail. Proper foot hygiene and care was discussed with the pt. Patient to check feet daily and contact the office with any questions/problems/concerns. Other comorbidity noted and will be managed by PCP. Continue weight bearing as tolerated in surgical shoe. Sutures removed  DSD, Ace, surgical shoe applied to left foot. Instructed patient to keep dressings clean, dry, intact until next post-op visit. Follow up in 2 week for pin removal    No orders of the defined types were placed in this encounter.

## 2022-07-19 ENCOUNTER — OFFICE VISIT (OUTPATIENT)
Dept: PODIATRY | Age: 67
End: 2022-07-19

## 2022-07-19 VITALS — WEIGHT: 126 LBS | BODY MASS INDEX: 24.74 KG/M2 | HEIGHT: 60 IN

## 2022-07-19 DIAGNOSIS — M20.42 HAMMER TOE OF LEFT FOOT: Primary | ICD-10-CM

## 2022-07-19 DIAGNOSIS — Z98.890 POST-OPERATIVE STATE: ICD-10-CM

## 2022-07-19 PROCEDURE — 99024 POSTOP FOLLOW-UP VISIT: CPT | Performed by: PODIATRIST

## 2022-07-19 ASSESSMENT — ENCOUNTER SYMPTOMS
COLOR CHANGE: 0
VOMITING: 0
DIARRHEA: 0
BACK PAIN: 1
NAUSEA: 0
CONSTIPATION: 0

## 2022-07-19 NOTE — PROGRESS NOTES
[address]  Return Patient    Chief Complaint   Patient presents with    Post-Op Check     Left foot pin removal        Alison Marquez is a 69y.o. year old female who is here 4  weeks post-op visit of left foot hammertoe repair of left 2nd toe (DOS 6/13/22). Reports pain is controlled at this time. Denies any trauma to the area. Has been compliant in surgical shoe, weight bearing with cane for ambulation. Denies any further pedal complaints at this time. Denies N/V/F/SOB/CP. Review of Systems   Constitutional:  Negative for chills, diaphoresis, fatigue, fever and unexpected weight change. Cardiovascular:  Positive for leg swelling. Gastrointestinal:  Negative for constipation, diarrhea, nausea and vomiting. Musculoskeletal:  Positive for arthralgias, back pain and gait problem. Negative for joint swelling. Skin:  Negative for color change, pallor, rash and wound. Neurological:  Negative for weakness and numbness. Vascular: DP and PT pulses palpable 2/4, Right Foot and 2/4 on the Left Foot. CFT <3 seconds, Right Foot and <3 seconds on the Left Foot. Edema is present b/l LE non-pitting,  Right Foot and presenton the Left Foot, minimal today. Varicosities present bilateral feet, ankles, legs. Neurological:   Sensation present  to light touch to level of digits, both feet. Musculoskeletal:   Muscle strength is 5/5 on the Right Foot and 5/5 on the Left Foot. Structural deformities are absent on the Right Foot and absent on the Left Foot. Pain to palpation in general to left toes 2nd, PIPJ. Minimal edema dorsal left foot. Integument:  Warm, dry, supple both feet. Sutures and K-wire pin intact to left foot 2nd digit, no signs of dehiscence, drainage, cellulitis, or acute signs of infection appreciated. Mild edema noted to left foot    Radiographs:  Three weightbearing views (AP, Oblique, and Lateral) of the left foot were obtained in the office today and reviewed, revealing no acute fracture, dislocation, or radioopaque foreign body/tumor. Overall alignment is satisfactory. Correction of the second toe with fusion of the proximal interphalangeal joint with pin fixation        Electronically signed by Radha Segovia DPM           Assesment :    Diagnosis Orders   1. Hammer toe of left foot        2. Post-operative state              Plan: Pt was evaluated and examined. Patient was given personalized discharge instructions. Pt will follow up in as needed or sooner if any problems arise. Diagnosis was discussed with the pt and all of their questions were answered in detail. Proper foot hygiene and care was discussed with the pt. Patient to check feet daily and contact the office with any questions/problems/concerns. Other comorbidity noted and will be managed by PCP. Continue weight bearing as tolerated in surgical shoe. OK to try a regular shoe  Pin removed today    No orders of the defined types were placed in this encounter.

## 2022-08-17 ENCOUNTER — OFFICE VISIT (OUTPATIENT)
Dept: PODIATRY | Age: 67
End: 2022-08-17
Payer: MEDICARE

## 2022-08-17 VITALS — BODY MASS INDEX: 24.74 KG/M2 | HEIGHT: 60 IN | WEIGHT: 126 LBS

## 2022-08-17 DIAGNOSIS — M20.42 HAMMER TOE OF LEFT FOOT: Primary | ICD-10-CM

## 2022-08-17 DIAGNOSIS — Z98.890 POST-OPERATIVE STATE: ICD-10-CM

## 2022-08-17 DIAGNOSIS — M19.072 OSTEOARTHRITIS OF TOE JOINT, LEFT: ICD-10-CM

## 2022-08-17 DIAGNOSIS — M79.675 PAIN OF GREAT TOE, LEFT: ICD-10-CM

## 2022-08-17 DIAGNOSIS — M79.672 FOOT PAIN, LEFT: ICD-10-CM

## 2022-08-17 DIAGNOSIS — B35.1 ONYCHOMYCOSIS: ICD-10-CM

## 2022-08-17 DIAGNOSIS — M21.70 LEG LENGTH DISCREPANCY: ICD-10-CM

## 2022-08-17 PROCEDURE — 1123F ACP DISCUSS/DSCN MKR DOCD: CPT | Performed by: PODIATRIST

## 2022-08-17 PROCEDURE — 99024 POSTOP FOLLOW-UP VISIT: CPT | Performed by: PODIATRIST

## 2022-08-17 RX ORDER — LEVOTHYROXINE SODIUM 88 UG/1
TABLET ORAL
COMMUNITY
Start: 2022-08-16

## 2022-08-17 RX ORDER — GABAPENTIN 100 MG/1
CAPSULE ORAL
COMMUNITY
Start: 2022-08-03

## 2022-08-17 ASSESSMENT — ENCOUNTER SYMPTOMS
COLOR CHANGE: 0
VOMITING: 0
DIARRHEA: 0
NAUSEA: 0
BACK PAIN: 1
CONSTIPATION: 0

## 2022-08-17 NOTE — PROGRESS NOTES
[address]  Return Patient    Chief Complaint   Patient presents with    Matthias Dan     Left foot       Rosalva Arias is a 69y.o. year old female who is here 8  weeks post-op visit of left foot hammertoe repair of left 2nd toe (DOS 6/13/22). Reports pain is controlled at this time. Denies any trauma to the area. Has been compliant in surgical shoe, weight bearing with cane for ambulation. She does have new complaints, not related to surgery. Pain left 4th toe, curling under, has tried home pads, some help    Thinks legs are different heights, has right sided pain, buttock, and down right leg. Thick painful toenails left foot. Denies N/V/F/SOB/CP. Review of Systems   Constitutional:  Negative for chills, diaphoresis, fatigue, fever and unexpected weight change. Cardiovascular:  Positive for leg swelling. Gastrointestinal:  Negative for constipation, diarrhea, nausea and vomiting. Musculoskeletal:  Positive for arthralgias, back pain and gait problem. Negative for joint swelling. Skin:  Negative for color change, pallor, rash and wound. Neurological:  Negative for weakness and numbness. Vascular: DP and PT pulses palpable 2/4, Right Foot and 2/4 on the Left Foot. CFT <3 seconds, Right Foot and <3 seconds on the Left Foot. Edema is present b/l LE non-pitting,  Right Foot and presenton the Left Foot, minimal today. Varicosities present bilateral feet, ankles, legs. Neurological:   Sensation present  to light touch to level of digits, both feet. Musculoskeletal:   Muscle strength is 5/5 on the Right Foot and 5/5 on the Left Foot. Structural deformities are absent on the Right Foot and absent on the Left Foot. No pain left 2nd and 3rd toe. Edema still present to left 2nd toe. Left 4th toe is adductovarus. Painful at the tip. When standing, right hip drop, right knee lower than left. Integument:  Warm, dry, supple both feet.  Nails 1, 2, 4 left are thick, discolored with debris. Radiographs: Three weightbearing views (AP, Oblique, and Lateral) of the left foot were obtained in the office today and reviewed, revealing no acute fracture, dislocation, or radioopaque foreign body/tumor. Overall alignment is satisfactory. Correction of the second toe with fusion of the proximal interphalangeal joint with pin fixation        Electronically signed by Lyubov Pickens DPM           Assesment :    Diagnosis Orders   1. Hammer toe of left foot        2. Post-operative state        3. Osteoarthritis of toe joint, left        4. Leg length discrepancy  XR LEG LENGTH EVALUATION      5. Onychomycosis        6. Pain of great toe, left        7. Foot pain, left              Plan: Pt was evaluated and examined. Patient was given personalized discharge instructions. Pt will follow up in as needed or sooner if any problems arise. Diagnosis was discussed with the pt and all of their questions were answered in detail. Proper foot hygiene and care was discussed with the pt. Patient to check feet daily and contact the office with any questions/problems/concerns. Other comorbidity noted and will be managed by PCP. Continue weight bearing as tolerated    regular shoe  Custom toe crest made for left 4th toe  Discussed flexor tenotomy as the next option    Order limb length studies  Heel lifts given to patient to try in the right shoe    Debrided nails left foot with a nipper and . No orders of the defined types were placed in this encounter.

## 2022-09-21 ENCOUNTER — OFFICE VISIT (OUTPATIENT)
Dept: PODIATRY | Age: 67
End: 2022-09-21
Payer: MEDICARE

## 2022-09-21 VITALS — HEIGHT: 60 IN | WEIGHT: 134 LBS | BODY MASS INDEX: 26.31 KG/M2

## 2022-09-21 DIAGNOSIS — M20.42 HAMMER TOE OF LEFT FOOT: Primary | ICD-10-CM

## 2022-09-21 DIAGNOSIS — M79.675 PAIN IN LEFT TOE(S): ICD-10-CM

## 2022-09-21 PROCEDURE — 99999 PR OFFICE/OUTPT VISIT,PROCEDURE ONLY: CPT | Performed by: PODIATRIST

## 2022-09-21 PROCEDURE — 28232 INCISION OF TOE TENDON: CPT | Performed by: PODIATRIST

## 2022-09-21 ASSESSMENT — ENCOUNTER SYMPTOMS
DIARRHEA: 0
VOMITING: 0
COLOR CHANGE: 0
BACK PAIN: 1
NAUSEA: 0
CONSTIPATION: 0

## 2022-09-21 NOTE — PROGRESS NOTES
[facility]  Return Patient    Chief Complaint   Patient presents with    Procedure     Tenotomy left foot        Sukhdev Hopkins is a 69y.o. year old female who is here 12 weeks post-op visit of left foot hammertoe repair of left 2nd toe (DOS 6/13/22). Reports pain is controlled at this time. Denies any trauma to the area. Has been compliant in surgical shoe, weight bearing with cane for ambulation. Follow up Pain left 4th toe, curling under, has tried home pads, some help. Would like flexor tenotomy. Denies N/V/F/SOB/CP. Review of Systems   Constitutional:  Negative for chills, diaphoresis, fatigue, fever and unexpected weight change. Cardiovascular:  Positive for leg swelling. Gastrointestinal:  Negative for constipation, diarrhea, nausea and vomiting. Musculoskeletal:  Positive for arthralgias, back pain and gait problem. Negative for joint swelling. Skin:  Negative for color change, pallor, rash and wound. Neurological:  Negative for weakness and numbness. Vascular: DP and PT pulses palpable 2/4, Right Foot and 2/4 on the Left Foot. CFT <3 seconds, Right Foot and <3 seconds on the Left Foot. Edema is present b/l LE non-pitting,  Right Foot and presenton the Left Foot, minimal today. Varicosities present bilateral feet, ankles, legs. Neurological:   Sensation present  to light touch to level of digits, both feet. Musculoskeletal:   Muscle strength is 5/5 on the Right Foot and 5/5 on the Left Foot. Structural deformities are absent on the Right Foot and absent on the Left Foot. No pain left 2nd and 3rd toe. Edema still present to left 2nd toe. Left 4th toe is adductovarus. Painful at the tip. When standing, right hip drop, right knee lower than left. Integument:  Warm, dry, supple both feet. Nails 1, 2, 4 left are thick, discolored with debris. Radiographs:  Three weightbearing views (AP, Oblique, and Lateral) of the left foot were obtained in the office today and reviewed, revealing no acute fracture, dislocation, or radioopaque foreign body/tumor. Overall alignment is satisfactory. Correction of the second toe with fusion of the proximal interphalangeal joint with pin fixation        Electronically signed by Maggy Blair DPM           Assesment :    Diagnosis Orders   1. Hammer toe of left foot  [place] INCISION FLEX TOE TENDON      2. Pain in left toe(s)  [place] INCISION FLEX TOE TENDON            Plan: Pt was evaluated and examined. Patient was given personalized discharge instructions. Pt will follow up in as needed or sooner if any problems arise. Diagnosis was discussed with the pt and all of their questions were answered in detail. Proper foot hygiene and care was discussed with the pt. Patient to check feet daily and contact the office with any questions/problems/concerns. Other comorbidity noted and will be managed by PCP. Procedure:  Flexor Tenotomy  Anatomic Location: left 4th toe    Verbal and written consent obtained from patient for Flexor tenotomy of the left 4th toe after all questions answered. This area was prepped with an alcohol swab and 3 cc total of 2% lidocaine plain was injected to the left 4th toe. The toe was prepped and draped in the usual sterile manner. A small stab incision was made plantar to the toe in the sulcus near the PIPJ. The Flexor tendon was transected and the toe immediately released tension. The toes was dorsiflexed to break up tightness at the PIPJ. The area was flushed with copious amount of saline. The hyperkeratosis and ulcer on the tip was lightly debrided and the hyperkeratotic rim was removed. The toe was dressed with adaptic and DSD. The patient will leave this intact for 2 days, keeping it dry. Then he can remove and apply a band aid and antibiotic ointment. No orders of the defined types were placed in this encounter.

## 2022-10-05 ENCOUNTER — OFFICE VISIT (OUTPATIENT)
Dept: PODIATRY | Age: 67
End: 2022-10-05
Payer: MEDICARE

## 2022-10-05 VITALS — HEIGHT: 60 IN | BODY MASS INDEX: 26.31 KG/M2 | WEIGHT: 134 LBS

## 2022-10-05 DIAGNOSIS — Z98.890 POST-OPERATIVE STATE: ICD-10-CM

## 2022-10-05 DIAGNOSIS — M20.42 HAMMER TOE OF LEFT FOOT: Primary | ICD-10-CM

## 2022-10-05 DIAGNOSIS — M79.675 PAIN IN LEFT TOE(S): ICD-10-CM

## 2022-10-05 PROCEDURE — 99024 POSTOP FOLLOW-UP VISIT: CPT | Performed by: PODIATRIST

## 2022-10-05 PROCEDURE — 1123F ACP DISCUSS/DSCN MKR DOCD: CPT | Performed by: PODIATRIST

## 2022-10-05 ASSESSMENT — ENCOUNTER SYMPTOMS
COLOR CHANGE: 0
VOMITING: 0
NAUSEA: 0
DIARRHEA: 0
CONSTIPATION: 0
BACK PAIN: 1

## 2022-10-05 NOTE — PROGRESS NOTES
[facility]  Return Patient    Chief Complaint   Patient presents with    Post-Op Check     Left foot recheck        Marquita Camejo is a 69y.o. year old female who is here 2 weeks post op flexor tenotomy left 4th toe. She is also 14 weeks post-op visit of left foot hammertoe repair of left 2nd toe (DOS 6/13/22). Reports pain is controlled at this time. Denies any trauma to the area. Has been compliant in surgical shoe, weight bearing with cane for ambulation. Denies N/V/F/SOB/CP. Review of Systems   Constitutional:  Negative for chills, diaphoresis, fatigue, fever and unexpected weight change. Cardiovascular:  Positive for leg swelling. Gastrointestinal:  Negative for constipation, diarrhea, nausea and vomiting. Musculoskeletal:  Positive for arthralgias, back pain and gait problem. Negative for joint swelling. Skin:  Negative for color change, pallor, rash and wound. Neurological:  Negative for weakness and numbness. Vascular: DP and PT pulses palpable 2/4, Right Foot and 2/4 on the Left Foot. CFT <3 seconds, Right Foot and <3 seconds on the Left Foot. Edema is present b/l LE non-pitting,  Right Foot and presenton the Left Foot, minimal today. Varicosities present bilateral feet, ankles, legs. Neurological:   Sensation present  to light touch to level of digits, both feet. Musculoskeletal:   Muscle strength is 5/5 on the Right Foot and 5/5 on the Left Foot. Structural deformities are absent on the Right Foot and absent on the Left Foot. No pain left 2nd and 3rd toe. Edema still present to left 2nd toe. Left 4th toe straight. No pain    Integument:  Warm, dry, supple both feet. Nails 1, 2, 4 left are thick, discolored with debris. Radiographs: Three weightbearing views (AP, Oblique, and Lateral) of the left foot were obtained in the office today and reviewed, revealing no acute fracture, dislocation, or radioopaque foreign body/tumor.  Overall alignment is

## 2024-07-19 ENCOUNTER — HOSPITAL ENCOUNTER (OUTPATIENT)
Dept: PREADMISSION TESTING | Age: 69
End: 2024-07-19
Payer: MEDICARE

## 2024-07-19 VITALS
RESPIRATION RATE: 16 BRPM | HEART RATE: 78 BPM | WEIGHT: 150 LBS | OXYGEN SATURATION: 100 % | TEMPERATURE: 97.1 F | HEIGHT: 60 IN | BODY MASS INDEX: 29.45 KG/M2 | SYSTOLIC BLOOD PRESSURE: 147 MMHG | DIASTOLIC BLOOD PRESSURE: 76 MMHG

## 2024-07-19 LAB
ANION GAP SERPL CALCULATED.3IONS-SCNC: 13 MMOL/L (ref 9–17)
BASOPHILS # BLD: 0.05 K/UL (ref 0–0.2)
BASOPHILS NFR BLD: 1 % (ref 0–2)
BILIRUB UR QL STRIP: NEGATIVE
BUN SERPL-MCNC: 18 MG/DL (ref 8–23)
BUN/CREAT SERPL: 12 (ref 9–20)
CALCIUM SERPL-MCNC: 9.4 MG/DL (ref 8.6–10.4)
CHLORIDE SERPL-SCNC: 104 MMOL/L (ref 98–107)
CLARITY UR: CLEAR
CO2 SERPL-SCNC: 25 MMOL/L (ref 20–31)
COLOR UR: YELLOW
COMMENT: NORMAL
CREAT SERPL-MCNC: 1.5 MG/DL (ref 0.5–0.9)
EKG ATRIAL RATE: 83 BPM
EKG P AXIS: 65 DEGREES
EKG P-R INTERVAL: 154 MS
EKG Q-T INTERVAL: 360 MS
EKG QRS DURATION: 82 MS
EKG QTC CALCULATION (BAZETT): 423 MS
EKG R AXIS: 6 DEGREES
EKG T AXIS: 50 DEGREES
EKG VENTRICULAR RATE: 83 BPM
EOSINOPHIL # BLD: 0.23 K/UL (ref 0–0.44)
EOSINOPHILS RELATIVE PERCENT: 5 % (ref 1–4)
ERYTHROCYTE [DISTWIDTH] IN BLOOD BY AUTOMATED COUNT: 13.6 % (ref 11.8–14.4)
GFR, ESTIMATED: 37 ML/MIN/1.73M2
GLUCOSE SERPL-MCNC: 127 MG/DL (ref 70–99)
GLUCOSE UR STRIP-MCNC: NEGATIVE MG/DL
HCT VFR BLD AUTO: 39.4 % (ref 36.3–47.1)
HGB BLD-MCNC: 12.7 G/DL (ref 11.9–15.1)
HGB UR QL STRIP.AUTO: NEGATIVE
IMM GRANULOCYTES # BLD AUTO: 0 K/UL (ref 0–0.3)
IMM GRANULOCYTES NFR BLD: 0 %
INR PPP: 1.1
KETONES UR STRIP-MCNC: NEGATIVE MG/DL
LEUKOCYTE ESTERASE UR QL STRIP: NEGATIVE
LYMPHOCYTES NFR BLD: 1.39 K/UL (ref 1.1–3.7)
LYMPHOCYTES RELATIVE PERCENT: 29 % (ref 24–43)
MCH RBC QN AUTO: 30.8 PG (ref 25.2–33.5)
MCHC RBC AUTO-ENTMCNC: 32.2 G/DL (ref 28.4–34.8)
MCV RBC AUTO: 95.6 FL (ref 82.6–102.9)
MONOCYTES NFR BLD: 0.41 K/UL (ref 0.1–1.2)
MONOCYTES NFR BLD: 8 % (ref 3–12)
NEUTROPHILS NFR BLD: 57 % (ref 36–65)
NEUTS SEG NFR BLD: 2.8 K/UL (ref 1.5–8.1)
NITRITE UR QL STRIP: NEGATIVE
PARTIAL THROMBOPLASTIN TIME: 22.9 SEC (ref 23.9–33.8)
PH UR STRIP: 5.5 [PH] (ref 5–8)
PLATELET # BLD AUTO: 256 K/UL (ref 138–453)
PMV BLD AUTO: 10.2 FL (ref 8.1–13.5)
POTASSIUM SERPL-SCNC: 3.9 MMOL/L (ref 3.7–5.3)
PROT UR STRIP-MCNC: NEGATIVE MG/DL
PROTHROMBIN TIME: 14.1 SEC (ref 11.5–14.2)
RBC # BLD AUTO: 4.12 M/UL (ref 3.95–5.11)
SODIUM SERPL-SCNC: 142 MMOL/L (ref 135–144)
SP GR UR STRIP: 1.01 (ref 1–1.03)
UROBILINOGEN UR STRIP-ACNC: NORMAL EU/DL (ref 0–1)
WBC OTHER # BLD: 4.9 K/UL (ref 3.5–11.3)

## 2024-07-19 PROCEDURE — 36415 COLL VENOUS BLD VENIPUNCTURE: CPT

## 2024-07-19 PROCEDURE — 87641 MR-STAPH DNA AMP PROBE: CPT

## 2024-07-19 PROCEDURE — 80048 BASIC METABOLIC PNL TOTAL CA: CPT

## 2024-07-19 PROCEDURE — 85025 COMPLETE CBC W/AUTO DIFF WBC: CPT

## 2024-07-19 PROCEDURE — 93005 ELECTROCARDIOGRAM TRACING: CPT | Performed by: ORTHOPAEDIC SURGERY

## 2024-07-19 PROCEDURE — 93010 ELECTROCARDIOGRAM REPORT: CPT | Performed by: INTERNAL MEDICINE

## 2024-07-19 PROCEDURE — 87086 URINE CULTURE/COLONY COUNT: CPT

## 2024-07-19 PROCEDURE — 85610 PROTHROMBIN TIME: CPT

## 2024-07-19 PROCEDURE — 81003 URINALYSIS AUTO W/O SCOPE: CPT

## 2024-07-19 PROCEDURE — 85730 THROMBOPLASTIN TIME PARTIAL: CPT

## 2024-07-19 RX ORDER — DOXYCYCLINE HYCLATE 100 MG
100 TABLET ORAL EVERY MORNING
COMMUNITY
Start: 2024-05-08

## 2024-07-19 RX ORDER — TIZANIDINE 2 MG/1
2 TABLET ORAL EVERY 8 HOURS PRN
COMMUNITY
Start: 2024-07-03

## 2024-07-19 RX ORDER — ASPIRIN 81 MG/1
81 TABLET ORAL DAILY
COMMUNITY

## 2024-07-19 ASSESSMENT — PAIN SCALES - GENERAL: PAINLEVEL_OUTOF10: 9

## 2024-07-19 ASSESSMENT — PAIN DESCRIPTION - DESCRIPTORS: DESCRIPTORS: ACHING;SHARP;SHOOTING

## 2024-07-19 ASSESSMENT — PAIN DESCRIPTION - LOCATION: LOCATION: BACK

## 2024-07-19 NOTE — PRE-PROCEDURE INSTRUCTIONS
On the Day of Your Surgery, Friday, 8/9/24, Please Arrive At 0630 AM     Enter the hospital through the Main Entrance, take the lobby elevators to the second floor and check in at the Surgery Registration desk.     Continue to take your home medications as you normally do up to and including the night before surgery with the exception of blood thinning medications.    Blood Thinning Medications:  Please stop prescription blood thinning medications such as Apixaban (Eliquis); Clopidogrel (Plavix); Dabigatran (Pradaxa); Prasugrel (Effient); Rivaroxaban (Xarelto); Ticagrelor (Brilinta); Warfarin (Coumadin) only as directed by your surgeon and/or the prescribing physician    Some common examples of other medications that can thin your blood are: Aspirin, Ibuprofen (Advil, Motrin), Naproxen (Aleve), Meloxicam (Mobic), Celecoxib (Celebrex), Fish Oil, many Herbal Supplements.  These medications should usually be stopped at least 7 days prior to surgery.    Omega-3, multivitamin, cranberry, krill oil, l-lysine. Check with cardiology to see when to stop taking aspirin.      Tylenol is OK to take for pain the week prior to surgery.    Failure to stop certain medications may interfere with your scheduled surgery.    If you receive instructions from your surgeon regarding what medications to stop prior to surgery, please follow those specific instructions.    Please take the following medication(s) the day of surgery with small sips of water:              Gabapentin, vilazodone, levothyroxine.    If you are on medicare and there is a possibility that you will be admitted following surgery:   Please bring your prescription medications in their original bottles with pharmacy label on the day of surgery.    Showering Before Surgery:     You can play an important role in your own health by carefully washing before surgery. Shower the night before and the morning of surgery using the instructions below. If you are allergic to

## 2024-07-20 LAB
MICROORGANISM SPEC CULT: NO GROWTH
MRSA, DNA, NASAL: NEGATIVE
SPECIMEN DESCRIPTION: NORMAL
SPECIMEN DESCRIPTION: NORMAL

## 2024-07-22 ENCOUNTER — ANESTHESIA EVENT (OUTPATIENT)
Dept: OPERATING ROOM | Age: 69
End: 2024-07-22
Payer: MEDICARE

## 2024-08-09 ENCOUNTER — APPOINTMENT (OUTPATIENT)
Dept: GENERAL RADIOLOGY | Age: 69
End: 2024-08-09
Attending: ORTHOPAEDIC SURGERY
Payer: MEDICARE

## 2024-08-09 ENCOUNTER — HOSPITAL ENCOUNTER (OUTPATIENT)
Age: 69
Setting detail: OUTPATIENT SURGERY
Discharge: HOME | End: 2024-08-09
Attending: ORTHOPAEDIC SURGERY | Admitting: ORTHOPAEDIC SURGERY
Payer: MEDICARE

## 2024-08-09 ENCOUNTER — ANESTHESIA (OUTPATIENT)
Dept: OPERATING ROOM | Age: 69
End: 2024-08-09
Payer: MEDICARE

## 2024-08-09 VITALS
DIASTOLIC BLOOD PRESSURE: 78 MMHG | HEART RATE: 80 BPM | WEIGHT: 150 LBS | RESPIRATION RATE: 16 BRPM | TEMPERATURE: 97.7 F | HEIGHT: 60 IN | BODY MASS INDEX: 29.45 KG/M2 | SYSTOLIC BLOOD PRESSURE: 157 MMHG | OXYGEN SATURATION: 93 %

## 2024-08-09 DIAGNOSIS — M53.3 SACROILIAC JOINT DYSFUNCTION OF RIGHT SIDE: Primary | ICD-10-CM

## 2024-08-09 LAB
ANION GAP SERPL CALCULATED.3IONS-SCNC: 10 MMOL/L (ref 9–17)
BUN SERPL-MCNC: 17 MG/DL (ref 8–23)
BUN/CREAT SERPL: 12 (ref 9–20)
CALCIUM SERPL-MCNC: 9.1 MG/DL (ref 8.6–10.4)
CHLORIDE SERPL-SCNC: 111 MMOL/L (ref 98–107)
CO2 SERPL-SCNC: 25 MMOL/L (ref 20–31)
CREAT SERPL-MCNC: 1.4 MG/DL (ref 0.5–0.9)
GFR, ESTIMATED: 40 ML/MIN/1.73M2
GLUCOSE SERPL-MCNC: 103 MG/DL (ref 70–99)
POTASSIUM SERPL-SCNC: 4.3 MMOL/L (ref 3.7–5.3)
SODIUM SERPL-SCNC: 146 MMOL/L (ref 135–144)

## 2024-08-09 PROCEDURE — 6370000000 HC RX 637 (ALT 250 FOR IP): Performed by: ORTHOPAEDIC SURGERY

## 2024-08-09 PROCEDURE — 7100000010 HC PHASE II RECOVERY - FIRST 15 MIN: Performed by: ORTHOPAEDIC SURGERY

## 2024-08-09 PROCEDURE — 2500000003 HC RX 250 WO HCPCS: Performed by: SPECIALIST

## 2024-08-09 PROCEDURE — 2500000003 HC RX 250 WO HCPCS: Performed by: ORTHOPAEDIC SURGERY

## 2024-08-09 PROCEDURE — 6360000002 HC RX W HCPCS: Performed by: ORTHOPAEDIC SURGERY

## 2024-08-09 PROCEDURE — 2580000003 HC RX 258: Performed by: SPECIALIST

## 2024-08-09 PROCEDURE — 7100000001 HC PACU RECOVERY - ADDTL 15 MIN: Performed by: ORTHOPAEDIC SURGERY

## 2024-08-09 PROCEDURE — 7100000011 HC PHASE II RECOVERY - ADDTL 15 MIN: Performed by: ORTHOPAEDIC SURGERY

## 2024-08-09 PROCEDURE — C1713 ANCHOR/SCREW BN/BN,TIS/BN: HCPCS | Performed by: ORTHOPAEDIC SURGERY

## 2024-08-09 PROCEDURE — 2720000010 HC SURG SUPPLY STERILE: Performed by: ORTHOPAEDIC SURGERY

## 2024-08-09 PROCEDURE — 6360000002 HC RX W HCPCS: Performed by: SPECIALIST

## 2024-08-09 PROCEDURE — 3700000000 HC ANESTHESIA ATTENDED CARE: Performed by: ORTHOPAEDIC SURGERY

## 2024-08-09 PROCEDURE — 3700000001 HC ADD 15 MINUTES (ANESTHESIA): Performed by: ORTHOPAEDIC SURGERY

## 2024-08-09 PROCEDURE — 2580000003 HC RX 258: Performed by: ANESTHESIOLOGY

## 2024-08-09 PROCEDURE — 2709999900 HC NON-CHARGEABLE SUPPLY: Performed by: ORTHOPAEDIC SURGERY

## 2024-08-09 PROCEDURE — 3600000012 HC SURGERY LEVEL 2 ADDTL 15MIN: Performed by: ORTHOPAEDIC SURGERY

## 2024-08-09 PROCEDURE — 80048 BASIC METABOLIC PNL TOTAL CA: CPT

## 2024-08-09 PROCEDURE — 6360000002 HC RX W HCPCS: Performed by: ANESTHESIOLOGY

## 2024-08-09 PROCEDURE — 7100000000 HC PACU RECOVERY - FIRST 15 MIN: Performed by: ORTHOPAEDIC SURGERY

## 2024-08-09 PROCEDURE — 3600000002 HC SURGERY LEVEL 2 BASE: Performed by: ORTHOPAEDIC SURGERY

## 2024-08-09 DEVICE — IMPLANTABLE DEVICE
Type: IMPLANTABLE DEVICE | Site: HIP | Status: FUNCTIONAL
Brand: IFUSE IMPLANT SYSTEM

## 2024-08-09 RX ORDER — SODIUM CHLORIDE 0.9 % (FLUSH) 0.9 %
5-40 SYRINGE (ML) INJECTION EVERY 12 HOURS SCHEDULED
Status: DISCONTINUED | OUTPATIENT
Start: 2024-08-09 | End: 2024-08-09 | Stop reason: HOSPADM

## 2024-08-09 RX ORDER — OXYCODONE HYDROCHLORIDE 5 MG/1
5 TABLET ORAL
Status: DISCONTINUED | OUTPATIENT
Start: 2024-08-09 | End: 2024-08-09 | Stop reason: HOSPADM

## 2024-08-09 RX ORDER — METOCLOPRAMIDE HYDROCHLORIDE 5 MG/ML
10 INJECTION INTRAMUSCULAR; INTRAVENOUS
Status: DISCONTINUED | OUTPATIENT
Start: 2024-08-09 | End: 2024-08-09 | Stop reason: HOSPADM

## 2024-08-09 RX ORDER — MAGNESIUM CARB/ALUMINUM HYDROX 105-160MG
TABLET,CHEWABLE ORAL PRN
Status: DISCONTINUED | OUTPATIENT
Start: 2024-08-09 | End: 2024-08-09 | Stop reason: ALTCHOICE

## 2024-08-09 RX ORDER — BUPIVACAINE HYDROCHLORIDE AND EPINEPHRINE 5; 5 MG/ML; UG/ML
INJECTION, SOLUTION EPIDURAL; INTRACAUDAL; PERINEURAL PRN
Status: DISCONTINUED | OUTPATIENT
Start: 2024-08-09 | End: 2024-08-09 | Stop reason: ALTCHOICE

## 2024-08-09 RX ORDER — LIDOCAINE HYDROCHLORIDE 20 MG/ML
INJECTION, SOLUTION EPIDURAL; INFILTRATION; INTRACAUDAL; PERINEURAL PRN
Status: DISCONTINUED | OUTPATIENT
Start: 2024-08-09 | End: 2024-08-09 | Stop reason: SDUPTHER

## 2024-08-09 RX ORDER — ROCURONIUM BROMIDE 10 MG/ML
INJECTION, SOLUTION INTRAVENOUS PRN
Status: DISCONTINUED | OUTPATIENT
Start: 2024-08-09 | End: 2024-08-09 | Stop reason: SDUPTHER

## 2024-08-09 RX ORDER — SODIUM CHLORIDE 9 MG/ML
INJECTION, SOLUTION INTRAVENOUS PRN
Status: DISCONTINUED | OUTPATIENT
Start: 2024-08-09 | End: 2024-08-09 | Stop reason: HOSPADM

## 2024-08-09 RX ORDER — DEXAMETHASONE SODIUM PHOSPHATE 10 MG/ML
INJECTION, SOLUTION INTRAMUSCULAR; INTRAVENOUS PRN
Status: DISCONTINUED | OUTPATIENT
Start: 2024-08-09 | End: 2024-08-09 | Stop reason: SDUPTHER

## 2024-08-09 RX ORDER — NALOXONE HYDROCHLORIDE 0.4 MG/ML
INJECTION, SOLUTION INTRAMUSCULAR; INTRAVENOUS; SUBCUTANEOUS PRN
Status: DISCONTINUED | OUTPATIENT
Start: 2024-08-09 | End: 2024-08-09 | Stop reason: HOSPADM

## 2024-08-09 RX ORDER — FENTANYL CITRATE 50 UG/ML
INJECTION, SOLUTION INTRAMUSCULAR; INTRAVENOUS PRN
Status: DISCONTINUED | OUTPATIENT
Start: 2024-08-09 | End: 2024-08-09 | Stop reason: SDUPTHER

## 2024-08-09 RX ORDER — SODIUM CHLORIDE, SODIUM LACTATE, POTASSIUM CHLORIDE, CALCIUM CHLORIDE 600; 310; 30; 20 MG/100ML; MG/100ML; MG/100ML; MG/100ML
INJECTION, SOLUTION INTRAVENOUS CONTINUOUS PRN
Status: DISCONTINUED | OUTPATIENT
Start: 2024-08-09 | End: 2024-08-09 | Stop reason: SDUPTHER

## 2024-08-09 RX ORDER — HYDROMORPHONE HYDROCHLORIDE 1 MG/ML
0.5 INJECTION, SOLUTION INTRAMUSCULAR; INTRAVENOUS; SUBCUTANEOUS EVERY 5 MIN PRN
Status: DISCONTINUED | OUTPATIENT
Start: 2024-08-09 | End: 2024-08-09 | Stop reason: HOSPADM

## 2024-08-09 RX ORDER — SODIUM CHLORIDE, SODIUM LACTATE, POTASSIUM CHLORIDE, CALCIUM CHLORIDE 600; 310; 30; 20 MG/100ML; MG/100ML; MG/100ML; MG/100ML
INJECTION, SOLUTION INTRAVENOUS CONTINUOUS
Status: DISCONTINUED | OUTPATIENT
Start: 2024-08-09 | End: 2024-08-09 | Stop reason: HOSPADM

## 2024-08-09 RX ORDER — HYDROCODONE BITARTRATE AND ACETAMINOPHEN 5; 325 MG/1; MG/1
1-2 TABLET ORAL EVERY 4 HOURS PRN
Qty: 60 TABLET | Refills: 0 | Status: SHIPPED | OUTPATIENT
Start: 2024-08-09 | End: 2024-08-16

## 2024-08-09 RX ORDER — FENTANYL CITRATE 50 UG/ML
25 INJECTION, SOLUTION INTRAMUSCULAR; INTRAVENOUS EVERY 5 MIN PRN
Status: DISCONTINUED | OUTPATIENT
Start: 2024-08-09 | End: 2024-08-09 | Stop reason: HOSPADM

## 2024-08-09 RX ORDER — SODIUM CHLORIDE 9 MG/ML
INJECTION, SOLUTION INTRAVENOUS CONTINUOUS
Status: DISCONTINUED | OUTPATIENT
Start: 2024-08-09 | End: 2024-08-09 | Stop reason: HOSPADM

## 2024-08-09 RX ORDER — CLINDAMYCIN PHOSPHATE 900 MG/50ML
900 INJECTION, SOLUTION INTRAVENOUS ONCE
Status: COMPLETED | OUTPATIENT
Start: 2024-08-09 | End: 2024-08-09

## 2024-08-09 RX ORDER — HALOPERIDOL 5 MG/ML
1 INJECTION INTRAMUSCULAR
Status: DISCONTINUED | OUTPATIENT
Start: 2024-08-09 | End: 2024-08-09 | Stop reason: HOSPADM

## 2024-08-09 RX ORDER — PHENYLEPHRINE HCL IN 0.9% NACL 1 MG/10 ML
SYRINGE (ML) INTRAVENOUS PRN
Status: DISCONTINUED | OUTPATIENT
Start: 2024-08-09 | End: 2024-08-09 | Stop reason: SDUPTHER

## 2024-08-09 RX ORDER — SODIUM CHLORIDE 0.9 % (FLUSH) 0.9 %
5-40 SYRINGE (ML) INJECTION PRN
Status: DISCONTINUED | OUTPATIENT
Start: 2024-08-09 | End: 2024-08-09 | Stop reason: HOSPADM

## 2024-08-09 RX ORDER — LIDOCAINE HYDROCHLORIDE 10 MG/ML
1 INJECTION, SOLUTION EPIDURAL; INFILTRATION; INTRACAUDAL; PERINEURAL
Status: DISCONTINUED | OUTPATIENT
Start: 2024-08-10 | End: 2024-08-09 | Stop reason: HOSPADM

## 2024-08-09 RX ORDER — PROPOFOL 10 MG/ML
INJECTION, EMULSION INTRAVENOUS PRN
Status: DISCONTINUED | OUTPATIENT
Start: 2024-08-09 | End: 2024-08-09 | Stop reason: SDUPTHER

## 2024-08-09 RX ORDER — ONDANSETRON 2 MG/ML
INJECTION INTRAMUSCULAR; INTRAVENOUS PRN
Status: DISCONTINUED | OUTPATIENT
Start: 2024-08-09 | End: 2024-08-09 | Stop reason: SDUPTHER

## 2024-08-09 RX ADMIN — DEXAMETHASONE SODIUM PHOSPHATE 10 MG: 10 INJECTION, SOLUTION INTRAMUSCULAR; INTRAVENOUS at 09:17

## 2024-08-09 RX ADMIN — FENTANYL CITRATE 25 MCG: 50 INJECTION INTRAMUSCULAR; INTRAVENOUS at 11:03

## 2024-08-09 RX ADMIN — CLINDAMYCIN PHOSPHATE 900 MG: 900 INJECTION, SOLUTION INTRAVENOUS at 09:15

## 2024-08-09 RX ADMIN — FENTANYL CITRATE 25 MCG: 50 INJECTION INTRAMUSCULAR; INTRAVENOUS at 09:06

## 2024-08-09 RX ADMIN — SODIUM CHLORIDE, POTASSIUM CHLORIDE, SODIUM LACTATE AND CALCIUM CHLORIDE: 600; 310; 30; 20 INJECTION, SOLUTION INTRAVENOUS at 07:05

## 2024-08-09 RX ADMIN — LIDOCAINE HYDROCHLORIDE 60 MG: 20 INJECTION, SOLUTION EPIDURAL; INFILTRATION; INTRACAUDAL; PERINEURAL at 09:06

## 2024-08-09 RX ADMIN — ROCURONIUM BROMIDE 40 MG: 10 INJECTION, SOLUTION INTRAVENOUS at 09:07

## 2024-08-09 RX ADMIN — PROPOFOL 100 MG: 10 INJECTION, EMULSION INTRAVENOUS at 09:06

## 2024-08-09 RX ADMIN — SUGAMMADEX 200 MG: 100 INJECTION, SOLUTION INTRAVENOUS at 10:13

## 2024-08-09 RX ADMIN — Medication 100 MCG: at 09:48

## 2024-08-09 RX ADMIN — FENTANYL CITRATE 75 MCG: 50 INJECTION INTRAMUSCULAR; INTRAVENOUS at 09:39

## 2024-08-09 RX ADMIN — SODIUM CHLORIDE, POTASSIUM CHLORIDE, SODIUM LACTATE AND CALCIUM CHLORIDE: 600; 310; 30; 20 INJECTION, SOLUTION INTRAVENOUS at 07:10

## 2024-08-09 RX ADMIN — ONDANSETRON 4 MG: 2 INJECTION INTRAMUSCULAR; INTRAVENOUS at 09:55

## 2024-08-09 ASSESSMENT — PAIN SCALES - GENERAL
PAINLEVEL_OUTOF10: 5
PAINLEVEL_OUTOF10: 2

## 2024-08-09 ASSESSMENT — PAIN DESCRIPTION - DESCRIPTORS
DESCRIPTORS: ACHING;SHOOTING
DESCRIPTORS: SORE;DISCOMFORT
DESCRIPTORS: DISCOMFORT

## 2024-08-09 ASSESSMENT — PAIN DESCRIPTION - LOCATION: LOCATION: HIP

## 2024-08-09 ASSESSMENT — PAIN - FUNCTIONAL ASSESSMENT
PAIN_FUNCTIONAL_ASSESSMENT: 0-10
PAIN_FUNCTIONAL_ASSESSMENT: FACE, LEGS, ACTIVITY, CRY, AND CONSOLABILITY (FLACC)

## 2024-08-09 ASSESSMENT — PAIN DESCRIPTION - ORIENTATION: ORIENTATION: RIGHT

## 2024-08-09 NOTE — H&P
Interval H&P Note    Pt Name: Melida Fischer  MRN: [id number]  YOB: 1955  Date of evaluation: 8/9/2024      [x] I have reviewed in epic the Preoperative Note by  PCP, Jes Westfall, APRN-CNP 7/23/24 for an Interval History and Physical note.     [x] I have examined  Melida Fischer, a 69 y.o. female with multiple known comorbidities and PMH of hypothyroidism, Sjogren - Ragini's syndrome, HDL, asthma, COPD, GERD, HTN with CKD III, anemia, MVP with murmur,  chronic back pain managed by PCP and multiple specialists who arrived for her scheduled SACROILIAC JOINT FUSION PERCUTANEOUS by Chris Claros MD for SI (sacroiliac) joint dysfunction. Known asthma/COPD used last antiasthmatic Symbicort and albuterol inhaler past month and took Singulair last night. The patient denies new health changes, fever, chills, wheezing, cough, increased SOB, chest pain, open sores or wounds.No DM  Last ASA 81mg week ago      Vital signs: BP (!) 182/96   Pulse 83   Temp 97.2 °F (36.2 °C)   Resp 16   Ht 1.524 m (5')   Wt 68 kg (150 lb)   LMP  (LMP Unknown)   SpO2 99%   BMI 29.29 kg/m²     Allergies:  Latex, Nickel, Aripiprazole, Benadryl [diphenhydramine], Benztropine, Cefadroxil, Cyclosporine, Demerol hcl [meperidine], Erythromycin, Keflex [cephalexin], Lansoprazole, Lomefloxacin, Loracarbef, Meperidine hcl, Other, Percocet [oxycodone-acetaminophen], Pneumococcal vaccines, Seroquel [quetiapine fumarate], Adhesive tape, and Cefazolin    Medications:    Prior to Admission medications    Medication Sig Start Date End Date Taking? Authorizing Provider   aspirin 81 MG EC tablet Take 1 tablet by mouth daily    Kyle Weller MD   doxycycline hyclate (VIBRA-TABS) 100 MG tablet Take 1 tablet by mouth every morning 5/8/24   Kyle Weller MD   tiZANidine (ZANAFLEX) 2 MG tablet Take 1 tablet by mouth every 8 hours as needed 7/3/24   Kyle Weller MD   gabapentin (NEURONTIN) 100 MG capsule TAKE 1 CAPSULE  Name Contact Address Communication Relationship to Patient   Kit Fischer [address] [place] [address] [phone] (Mobile)  [phone] (Home)  [email or web address] [email or web address], Emergency Contact   [place] [address] [phone] (Mobile)  [phone] (Home) Daughter, Emergency Contact     Document Information    Primary Care Provider Other Service Providers Document Coverage Dates   SILVIA Wilkinson (Jan. 24, 2018January 24, 2018 - Present)  NPI: [id number]  [phone] (Work)  [phone] (Fax)  [address] [facility] [address]  [place] [address]  Family Medicine  [address] [address]  Jul. 23, 2024July 23, 2024      Organization   [address] [address]     Encounter Providers Encounter Date   SILVIA Wilkinson (Attending)  NPI: [id number]  [phone] (Work)  [phone] (Fax)  [facility]  [place] [address]  Family Medicine Jul. 23, 2024July 23, 2024     Legal Authenticator      [facility]

## 2024-08-09 NOTE — ANESTHESIA PRE PROCEDURE
performed by Nneka Wallace DPM at [facility] OR    HYSTERECTOMY (CERVIX STATUS UNKNOWN)      KNEE SURGERY Left      meniscectomy    KNEE SURGERY      right knee meniscectomy    NECK SURGERY      hardware    OTHER SURGICAL HISTORY      lower back    RECTAL PROLAPSE REPAIR      x2    SHOULDER SURGERY Bilateral     RTC repairs    SKIN BIOPSY      SPINE SURGERY      TONSILLECTOMY      TUBAL LIGATION      bilateral    UPPER GASTROINTESTINAL ENDOSCOPY  2007    funal gastric polyp     UPPER GASTROINTESTINAL ENDOSCOPY      bleedig ulcer    UPPER GASTROINTESTINAL ENDOSCOPY N/A 6/8/2022    EGD BIOPSY performed by Jordyn Pavon MD at [facility]       Social History:    Social History     Tobacco Use    Smoking status: Never    Smokeless tobacco: Never   Substance Use Topics    Alcohol use: Never                                Counseling given: Not Answered      Vital Signs (Current):   There were no vitals filed for this visit.                                             BP Readings from Last 3 Encounters:   07/19/24 (!) 147/76   06/13/22 (!) 142/80   06/08/22 133/65       NPO Status:                                                                                 BMI:   Wt Readings from Last 3 Encounters:   07/19/24 68 kg (150 lb)   10/05/22 60.8 kg (134 lb)   09/21/22 60.8 kg (134 lb)     There is no height or weight on file to calculate BMI.    CBC:   Lab Results   Component Value Date/Time    WBC 4.9 07/19/2024 02:34 PM    RBC 4.12 07/19/2024 02:34 PM    HGB 12.7 07/19/2024 02:34 PM    HCT 39.4 07/19/2024 02:34 PM    MCV 95.6 07/19/2024 02:34 PM    RDW 13.6 07/19/2024 02:34 PM     07/19/2024 02:34 PM       CMP:   Lab Results   Component Value Date/Time     07/19/2024 02:34 PM    K 3.9 07/19/2024 02:34 PM     07/19/2024 02:34 PM    CO2 25 07/19/2024 02:34 PM    BUN 18 07/19/2024 02:34 PM    CREATININE 1.5 07/19/2024 02:34 PM    GFRAA >60 04/07/2022 04:39 AM    LABGLOM 37 07/19/2024 02:34 PM

## 2024-08-09 NOTE — DISCHARGE INSTRUCTIONS
PWB RLE x 3 wks  Dry dressing changes daily x 1wk, start in 3 days  Stitches are dissolvable and do not need to be removed  Call for any fevers, wound redness, swelling or drainage after 4 days [phone]  May shower in 3 days          Chris Claros MD  [facility]  Spine Surgeon  [phone]

## 2024-08-09 NOTE — OP NOTE
Operative Note      Patient: Melida Fischer  YOB: 1955  MRN: [id number]    Date of Procedure: 8/9/2024    SURGEON:  Chris Claros MD.     ANESTHESIA:  General endotracheal anesthesia.     PREOPERATIVE DIAGNOSIS:  right sacroiliac dysfunction.     POSTOPERATIVE DIAGNOSIS:  right sacroiliac dysfunction.     PROCEDURE:  1. right minimally-invasive sacroiliac joint fusion utilizing      the iFuse implant system.  2. Intraoperative use of C-arm fluoroscopy.     ESTIMATED BLOOD LOSS:  10 mL.     FLUIDS:  Per anesthesia record.     COMPLICATIONS:  None.     INDICATIONS:  This is a pleasant 69-year-old female  with  history of chronic pain situation. She had done extensive conservative management in terms  of workup for her low back as well as her sacroiliac joints. She  had multiple injections in the sacroiliac joints which did  confirm relived pain. She was having the  continued pain on the right side and had failed conservative  management. It was discussed with him the option of performing  sacroiliac joint fusion on that right side as well. Risk and  benefits were discussed including bleeding, infection, injury to  nerves, vessels, anesthetic risk, the need for possible further  future surgery, as well as the possibility for continued pain,  continued symptoms, possible nonunion. She did understand all  these risks and did wish to proceed and informed consent was  obtained.     DESCRIPTION OF PROCEDURE:  The patient was taken to the operating  room, kept supine on his bed. She was intubated, placed under  general anesthesia by the anesthesiologist. She was given  preoperative antibiotic prophylaxis. She was then placed prone on  the Cedric table with towel beneath his chest and pelvis. All  bony prominences were padded. Eyes were kept free of any  pressure. Brachial plexus and elbows were padded as well. She was  then taped in this position on to the table and the right buttock  and lower back were

## 2024-08-09 NOTE — ANESTHESIA POSTPROCEDURE EVALUATION
Department of Anesthesiology  Postprocedure Note    Patient: Melida Fischer  MRN: [id number]  YOB: 1955  Date of evaluation: 8/9/2024    Procedure Summary       Date: 08/09/24 Room / Location: [address]    Anesthesia Start: 0858 Anesthesia Stop: 1030    Procedure: SACROILIAC JOINT FUSION PERCUTANEOUS (Right: Hip) Diagnosis:       SI (sacroiliac) joint dysfunction      (SI (sacroiliac) joint dysfunction [M53.3])    Surgeons: Chris Claros MD Responsible Provider: Kelli Mansfield MD    Anesthesia Type: general ASA Status: 3            Anesthesia Type: No value filed.    Soto Phase I: Soto Score: 10    Soto Phase II: Soto Score: 10    Anesthesia Post Evaluation    Patient location during evaluation: PACU  Patient participation: complete - patient participated  Level of consciousness: awake  Airway patency: patent  Nausea & Vomiting: no nausea  Cardiovascular status: blood pressure returned to baseline  Respiratory status: acceptable  Hydration status: euvolemic  Comments: Multimodal analgesia pain management as indicated by procedure  Multimodal analgesia pain management approach  Pain management: adequate    No notable events documented.

## 2024-08-09 NOTE — BRIEF OP NOTE
Brief Postoperative Note      Patient: Melida Fischer  YOB: 1955  MRN: [id number]    Date of Procedure: 8/9/2024    Pre-Op Diagnosis Codes:     * SI (sacroiliac) joint dysfunction [M53.3] right    Post-Op Diagnosis: Same       Procedure(s):  SACROILIAC JOINT FUSION PERCUTANEOUS right    Surgeon(s):  Chris Claros MD    Assistant:  First Assistant: Geoffrey Sánchez RN    Anesthesia: General    Estimated Blood Loss (mL): Minimal    Complications: None    Specimens:   * No specimens in log *    Implants:  Implant Name Type Inv. Item Serial No.  Lot No. LRB No. Used Action   IMPL SPINE I-FUSE 3D 7.0X45 MM 3D - [id number] Spine IMPL SPINE I-FUSE 3D 7.0X45 MM 3D  [employer] [id number] Right 1 Implanted   IMPLANT SPNL L45MM DIA7MM SACROILIAC JT TI POR PLSM SPR - [id number]  IMPLANT SPNL L45MM DIA7MM SACROILIAC JT TI POR PLSM SPR  [employer] [id number] Right 1 Implanted         Drains: * No LDAs found *    Findings:  Infection Present At Time Of Surgery (PATOS) (choose all levels that have infection present):  No infection present    Electronically signed by CHRIS CLAROS MD on 8/9/2024 at 10:02 AM

## 2024-09-27 ENCOUNTER — HOSPITAL ENCOUNTER (OUTPATIENT)
Dept: PREADMISSION TESTING | Age: 69
Discharge: HOME | End: 2024-10-01

## 2024-09-27 VITALS — BODY MASS INDEX: 29.45 KG/M2 | HEIGHT: 60 IN | WEIGHT: 150 LBS

## 2024-09-27 NOTE — PRE-PROCEDURE INSTRUCTIONS
small sips of water to take the medications listed above.  No smoking or chewing tobacco after midnight.  No alcoholic beverages for 24 hours prior to surgery.  2. You may brush your teeth but do not swallow the water.  3. If you wear glasses bring a case for them if you have one.  No contacts should be worn the day of surgery.  You may also bring your hearing aids. If you have dentures, most surgical procedures involving anesthesia will require that you remove them prior to surgery.  4. If you sleep with a CPAP or BiPAP machine at home and plan on staying in the hospital overnight after surgery, please bring your machine with you.   5. Do not wear any jewelry or body piercings the day of surgery.  No nail polish on the operative extremity (arm/hand or leg/foot surgeries)   6. If you are staying overnight with us, you may bring a small bag of necessary personal items.   7. Please wear loose, comfortable clothing.  If you are potentially going to have a cast, sling, brace or bulky dressing, make sure to wear clothing that will fit over it.   8. In case of illness - If you have cold or flu like symptoms (high fever, runny nose, sore throat, cough, etc.) rash, nausea, vomiting, loose stools, and/or recent contact with someone who has a contagious disease (chicken pox, measles, COVID-19, etc.).  Please call your surgeon before coming to the hospital.    Transportation After Your Surgery/Procedure:     If you are going home the same day of surgery you need someone to drive you home.  Your  must be at least 18 years of age.  A taxi cab or other nonmedical public transportation is not acceptable unless you have someone to ride home in the vehicle with you.   For your safety, someone must remain with you for the first 24 hours after your surgery if you receive anesthesia or medication.  If you do not have someone to stay with you, your procedure may be cancelled.  As a patient at [facility] you can expect

## 2024-10-18 ENCOUNTER — APPOINTMENT (OUTPATIENT)
Dept: GENERAL RADIOLOGY | Age: 69
End: 2024-10-18
Attending: ORTHOPAEDIC SURGERY
Payer: MEDICARE

## 2024-10-18 ENCOUNTER — ANESTHESIA (OUTPATIENT)
Dept: OPERATING ROOM | Age: 69
End: 2024-10-18
Payer: MEDICARE

## 2024-10-18 ENCOUNTER — ANESTHESIA EVENT (OUTPATIENT)
Dept: OPERATING ROOM | Age: 69
End: 2024-10-18
Payer: MEDICARE

## 2024-10-18 ENCOUNTER — HOSPITAL ENCOUNTER (OUTPATIENT)
Age: 69
Setting detail: OUTPATIENT SURGERY
Discharge: HOME | End: 2024-10-18
Attending: ORTHOPAEDIC SURGERY | Admitting: ORTHOPAEDIC SURGERY
Payer: MEDICARE

## 2024-10-18 VITALS
RESPIRATION RATE: 16 BRPM | OXYGEN SATURATION: 99 % | DIASTOLIC BLOOD PRESSURE: 70 MMHG | SYSTOLIC BLOOD PRESSURE: 129 MMHG | HEART RATE: 82 BPM | TEMPERATURE: 97.5 F

## 2024-10-18 DIAGNOSIS — M53.3 SACROILIAC JOINT DYSFUNCTION OF LEFT SIDE: Primary | ICD-10-CM

## 2024-10-18 LAB
ANION GAP SERPL CALCULATED.3IONS-SCNC: 12 MMOL/L (ref 9–17)
BUN SERPL-MCNC: 20 MG/DL (ref 8–23)
BUN/CREAT SERPL: 14 (ref 9–20)
CALCIUM SERPL-MCNC: 9.2 MG/DL (ref 8.6–10.4)
CHLORIDE SERPL-SCNC: 109 MMOL/L (ref 98–107)
CO2 SERPL-SCNC: 24 MMOL/L (ref 20–31)
CREAT SERPL-MCNC: 1.4 MG/DL (ref 0.5–0.9)
ERYTHROCYTE [DISTWIDTH] IN BLOOD BY AUTOMATED COUNT: 13.2 % (ref 11.8–14.4)
GFR, ESTIMATED: 40 ML/MIN/1.73M2
GLUCOSE SERPL-MCNC: 120 MG/DL (ref 70–99)
HCT VFR BLD AUTO: 37.7 % (ref 36.3–47.1)
HGB BLD-MCNC: 12.3 G/DL (ref 11.9–15.1)
MCH RBC QN AUTO: 30.2 PG (ref 25.2–33.5)
MCHC RBC AUTO-ENTMCNC: 32.6 G/DL (ref 28.4–34.8)
MCV RBC AUTO: 92.6 FL (ref 82.6–102.9)
NRBC BLD-RTO: 0 PER 100 WBC
PLATELET # BLD AUTO: 281 K/UL (ref 138–453)
PMV BLD AUTO: 10 FL (ref 8.1–13.5)
POTASSIUM SERPL-SCNC: 3.8 MMOL/L (ref 3.7–5.3)
RBC # BLD AUTO: 4.07 M/UL (ref 3.95–5.11)
SODIUM SERPL-SCNC: 145 MMOL/L (ref 135–144)
WBC OTHER # BLD: 3.8 K/UL (ref 3.5–11.3)

## 2024-10-18 PROCEDURE — 85027 COMPLETE CBC AUTOMATED: CPT

## 2024-10-18 PROCEDURE — 3700000000 HC ANESTHESIA ATTENDED CARE: Performed by: ORTHOPAEDIC SURGERY

## 2024-10-18 PROCEDURE — A4217 STERILE WATER/SALINE, 500 ML: HCPCS | Performed by: ORTHOPAEDIC SURGERY

## 2024-10-18 PROCEDURE — 2500000003 HC RX 250 WO HCPCS

## 2024-10-18 PROCEDURE — 2709999900 HC NON-CHARGEABLE SUPPLY: Performed by: ORTHOPAEDIC SURGERY

## 2024-10-18 PROCEDURE — 7100000011 HC PHASE II RECOVERY - ADDTL 15 MIN: Performed by: ORTHOPAEDIC SURGERY

## 2024-10-18 PROCEDURE — 6360000002 HC RX W HCPCS

## 2024-10-18 PROCEDURE — 2580000003 HC RX 258

## 2024-10-18 PROCEDURE — 36415 COLL VENOUS BLD VENIPUNCTURE: CPT

## 2024-10-18 PROCEDURE — 6370000000 HC RX 637 (ALT 250 FOR IP): Performed by: ORTHOPAEDIC SURGERY

## 2024-10-18 PROCEDURE — 6360000002 HC RX W HCPCS: Performed by: ORTHOPAEDIC SURGERY

## 2024-10-18 PROCEDURE — 3600000012 HC SURGERY LEVEL 2 ADDTL 15MIN: Performed by: ORTHOPAEDIC SURGERY

## 2024-10-18 PROCEDURE — 3700000001 HC ADD 15 MINUTES (ANESTHESIA): Performed by: ORTHOPAEDIC SURGERY

## 2024-10-18 PROCEDURE — 7100000010 HC PHASE II RECOVERY - FIRST 15 MIN: Performed by: ORTHOPAEDIC SURGERY

## 2024-10-18 PROCEDURE — 3600000002 HC SURGERY LEVEL 2 BASE: Performed by: ORTHOPAEDIC SURGERY

## 2024-10-18 PROCEDURE — 2500000003 HC RX 250 WO HCPCS: Performed by: ORTHOPAEDIC SURGERY

## 2024-10-18 PROCEDURE — 7100000001 HC PACU RECOVERY - ADDTL 15 MIN: Performed by: ORTHOPAEDIC SURGERY

## 2024-10-18 PROCEDURE — 2580000003 HC RX 258: Performed by: ORTHOPAEDIC SURGERY

## 2024-10-18 PROCEDURE — C1713 ANCHOR/SCREW BN/BN,TIS/BN: HCPCS | Performed by: ORTHOPAEDIC SURGERY

## 2024-10-18 PROCEDURE — 7100000000 HC PACU RECOVERY - FIRST 15 MIN: Performed by: ORTHOPAEDIC SURGERY

## 2024-10-18 PROCEDURE — 2720000010 HC SURG SUPPLY STERILE: Performed by: ORTHOPAEDIC SURGERY

## 2024-10-18 PROCEDURE — 80048 BASIC METABOLIC PNL TOTAL CA: CPT

## 2024-10-18 DEVICE — PIN FIX DIA3.2MM EXCHG DISP FOR IFUSE IMPL SYS: Type: IMPLANTABLE DEVICE | Site: SACRUM | Status: FUNCTIONAL

## 2024-10-18 DEVICE — IMPLANTABLE DEVICE
Type: IMPLANTABLE DEVICE | Site: SACRUM | Status: FUNCTIONAL
Brand: IFUSE IMPLANT SYSTEM

## 2024-10-18 RX ORDER — SODIUM CHLORIDE, SODIUM LACTATE, POTASSIUM CHLORIDE, CALCIUM CHLORIDE 600; 310; 30; 20 MG/100ML; MG/100ML; MG/100ML; MG/100ML
INJECTION, SOLUTION INTRAVENOUS CONTINUOUS
Status: DISCONTINUED | OUTPATIENT
Start: 2024-10-18 | End: 2024-10-18 | Stop reason: HOSPADM

## 2024-10-18 RX ORDER — METOCLOPRAMIDE HYDROCHLORIDE 5 MG/ML
10 INJECTION INTRAMUSCULAR; INTRAVENOUS
Status: DISCONTINUED | OUTPATIENT
Start: 2024-10-18 | End: 2024-10-18 | Stop reason: HOSPADM

## 2024-10-18 RX ORDER — SODIUM CHLORIDE 0.9 % (FLUSH) 0.9 %
5-40 SYRINGE (ML) INJECTION PRN
Status: DISCONTINUED | OUTPATIENT
Start: 2024-10-18 | End: 2024-10-18 | Stop reason: HOSPADM

## 2024-10-18 RX ORDER — OXYCODONE HYDROCHLORIDE 5 MG/1
5 TABLET ORAL
Status: DISCONTINUED | OUTPATIENT
Start: 2024-10-18 | End: 2024-10-18 | Stop reason: HOSPADM

## 2024-10-18 RX ORDER — ROCURONIUM BROMIDE 50 MG/5 ML
SYRINGE (ML) INTRAVENOUS
Status: DISCONTINUED | OUTPATIENT
Start: 2024-10-18 | End: 2024-10-18 | Stop reason: SDUPTHER

## 2024-10-18 RX ORDER — DEXAMETHASONE SODIUM PHOSPHATE 10 MG/ML
INJECTION INTRAMUSCULAR; INTRAVENOUS
Status: DISCONTINUED | OUTPATIENT
Start: 2024-10-18 | End: 2024-10-18 | Stop reason: SDUPTHER

## 2024-10-18 RX ORDER — FENTANYL CITRATE 50 UG/ML
INJECTION, SOLUTION INTRAMUSCULAR; INTRAVENOUS
Status: DISCONTINUED | OUTPATIENT
Start: 2024-10-18 | End: 2024-10-18 | Stop reason: SDUPTHER

## 2024-10-18 RX ORDER — SODIUM CHLORIDE 0.9 % (FLUSH) 0.9 %
5-40 SYRINGE (ML) INJECTION EVERY 12 HOURS SCHEDULED
Status: DISCONTINUED | OUTPATIENT
Start: 2024-10-18 | End: 2024-10-18 | Stop reason: HOSPADM

## 2024-10-18 RX ORDER — SODIUM CHLORIDE 9 MG/ML
INJECTION, SOLUTION INTRAMUSCULAR; INTRAVENOUS; SUBCUTANEOUS PRN
Status: DISCONTINUED | OUTPATIENT
Start: 2024-10-18 | End: 2024-10-18 | Stop reason: ALTCHOICE

## 2024-10-18 RX ORDER — PHENYLEPHRINE HCL IN 0.9% NACL 1 MG/10 ML
SYRINGE (ML) INTRAVENOUS
Status: DISCONTINUED | OUTPATIENT
Start: 2024-10-18 | End: 2024-10-18 | Stop reason: SDUPTHER

## 2024-10-18 RX ORDER — ONDANSETRON 2 MG/ML
INJECTION INTRAMUSCULAR; INTRAVENOUS
Status: DISCONTINUED | OUTPATIENT
Start: 2024-10-18 | End: 2024-10-18 | Stop reason: SDUPTHER

## 2024-10-18 RX ORDER — FENTANYL CITRATE 50 UG/ML
25 INJECTION, SOLUTION INTRAMUSCULAR; INTRAVENOUS EVERY 5 MIN PRN
Status: DISCONTINUED | OUTPATIENT
Start: 2024-10-18 | End: 2024-10-18 | Stop reason: HOSPADM

## 2024-10-18 RX ORDER — SODIUM CHLORIDE 9 MG/ML
INJECTION, SOLUTION INTRAVENOUS PRN
Status: DISCONTINUED | OUTPATIENT
Start: 2024-10-18 | End: 2024-10-18 | Stop reason: HOSPADM

## 2024-10-18 RX ORDER — MAGNESIUM CARB/ALUMINUM HYDROX 105-160MG
TABLET,CHEWABLE ORAL PRN
Status: DISCONTINUED | OUTPATIENT
Start: 2024-10-18 | End: 2024-10-18 | Stop reason: ALTCHOICE

## 2024-10-18 RX ORDER — MEPERIDINE HYDROCHLORIDE 50 MG/ML
12.5 INJECTION INTRAMUSCULAR; INTRAVENOUS; SUBCUTANEOUS EVERY 5 MIN PRN
Status: DISCONTINUED | OUTPATIENT
Start: 2024-10-18 | End: 2024-10-18 | Stop reason: HOSPADM

## 2024-10-18 RX ORDER — CLINDAMYCIN PHOSPHATE 900 MG/50ML
900 INJECTION, SOLUTION INTRAVENOUS ONCE
Status: COMPLETED | OUTPATIENT
Start: 2024-10-18 | End: 2024-10-18

## 2024-10-18 RX ORDER — HYDROCODONE BITARTRATE AND ACETAMINOPHEN 5; 325 MG/1; MG/1
1-2 TABLET ORAL EVERY 4 HOURS PRN
Qty: 60 TABLET | Refills: 0 | Status: SHIPPED | OUTPATIENT
Start: 2024-10-18 | End: 2024-10-25

## 2024-10-18 RX ORDER — PROPOFOL 10 MG/ML
INJECTION, EMULSION INTRAVENOUS
Status: DISCONTINUED | OUTPATIENT
Start: 2024-10-18 | End: 2024-10-18 | Stop reason: SDUPTHER

## 2024-10-18 RX ORDER — DIPHENHYDRAMINE HYDROCHLORIDE 50 MG/ML
12.5 INJECTION INTRAMUSCULAR; INTRAVENOUS
Status: DISCONTINUED | OUTPATIENT
Start: 2024-10-18 | End: 2024-10-18 | Stop reason: HOSPADM

## 2024-10-18 RX ORDER — BUPIVACAINE HYDROCHLORIDE AND EPINEPHRINE 5; 5 MG/ML; UG/ML
INJECTION, SOLUTION EPIDURAL; INTRACAUDAL; PERINEURAL PRN
Status: DISCONTINUED | OUTPATIENT
Start: 2024-10-18 | End: 2024-10-18 | Stop reason: ALTCHOICE

## 2024-10-18 RX ORDER — LIDOCAINE HYDROCHLORIDE 10 MG/ML
1 INJECTION, SOLUTION EPIDURAL; INFILTRATION; INTRACAUDAL; PERINEURAL
Status: DISCONTINUED | OUTPATIENT
Start: 2024-10-19 | End: 2024-10-18 | Stop reason: HOSPADM

## 2024-10-18 RX ORDER — HYDROMORPHONE HYDROCHLORIDE 1 MG/ML
0.5 INJECTION, SOLUTION INTRAMUSCULAR; INTRAVENOUS; SUBCUTANEOUS EVERY 5 MIN PRN
Status: DISCONTINUED | OUTPATIENT
Start: 2024-10-18 | End: 2024-10-18 | Stop reason: HOSPADM

## 2024-10-18 RX ORDER — SODIUM CHLORIDE 9 MG/ML
INJECTION, SOLUTION INTRAVENOUS CONTINUOUS
Status: DISCONTINUED | OUTPATIENT
Start: 2024-10-18 | End: 2024-10-18 | Stop reason: HOSPADM

## 2024-10-18 RX ORDER — PROCHLORPERAZINE EDISYLATE 5 MG/ML
10 INJECTION INTRAMUSCULAR; INTRAVENOUS
Status: DISCONTINUED | OUTPATIENT
Start: 2024-10-18 | End: 2024-10-18 | Stop reason: HOSPADM

## 2024-10-18 RX ORDER — NALOXONE HYDROCHLORIDE 0.4 MG/ML
INJECTION, SOLUTION INTRAMUSCULAR; INTRAVENOUS; SUBCUTANEOUS PRN
Status: DISCONTINUED | OUTPATIENT
Start: 2024-10-18 | End: 2024-10-18 | Stop reason: HOSPADM

## 2024-10-18 RX ORDER — LIDOCAINE HYDROCHLORIDE 20 MG/ML
INJECTION, SOLUTION EPIDURAL; INFILTRATION; INTRACAUDAL; PERINEURAL
Status: DISCONTINUED | OUTPATIENT
Start: 2024-10-18 | End: 2024-10-18 | Stop reason: SDUPTHER

## 2024-10-18 RX ADMIN — Medication 100 MCG: at 08:05

## 2024-10-18 RX ADMIN — Medication 100 MCG: at 08:38

## 2024-10-18 RX ADMIN — Medication 100 MCG: at 08:20

## 2024-10-18 RX ADMIN — ONDANSETRON 4 MG: 2 INJECTION, SOLUTION INTRAMUSCULAR; INTRAVENOUS at 07:45

## 2024-10-18 RX ADMIN — Medication 100 MCG: at 07:56

## 2024-10-18 RX ADMIN — SODIUM CHLORIDE, POTASSIUM CHLORIDE, SODIUM LACTATE AND CALCIUM CHLORIDE: 600; 310; 30; 20 INJECTION, SOLUTION INTRAVENOUS at 06:40

## 2024-10-18 RX ADMIN — LIDOCAINE HYDROCHLORIDE 80 MG: 20 INJECTION, SOLUTION EPIDURAL; INFILTRATION; INTRACAUDAL; PERINEURAL at 07:45

## 2024-10-18 RX ADMIN — Medication 100 MCG: at 08:26

## 2024-10-18 RX ADMIN — FENTANYL CITRATE 50 MCG: 50 INJECTION INTRAMUSCULAR; INTRAVENOUS at 07:45

## 2024-10-18 RX ADMIN — Medication 40 MG: at 07:45

## 2024-10-18 RX ADMIN — DEXAMETHASONE SODIUM PHOSPHATE 10 MG: 10 INJECTION, SOLUTION INTRAMUSCULAR; INTRAVENOUS at 07:45

## 2024-10-18 RX ADMIN — PROPOFOL 130 MG: 10 INJECTION, EMULSION INTRAVENOUS at 07:45

## 2024-10-18 RX ADMIN — CLINDAMYCIN PHOSPHATE 900 MG: 900 INJECTION, SOLUTION INTRAVENOUS at 07:47

## 2024-10-18 RX ADMIN — FENTANYL CITRATE 50 MCG: 50 INJECTION INTRAMUSCULAR; INTRAVENOUS at 08:08

## 2024-10-18 RX ADMIN — SUGAMMADEX 200 MG: 100 INJECTION, SOLUTION INTRAVENOUS at 08:40

## 2024-10-18 RX ADMIN — Medication 100 MCG: at 08:11

## 2024-10-18 ASSESSMENT — PAIN - FUNCTIONAL ASSESSMENT
PAIN_FUNCTIONAL_ASSESSMENT: 0-10
PAIN_FUNCTIONAL_ASSESSMENT: PREVENTS OR INTERFERES SOME ACTIVE ACTIVITIES AND ADLS

## 2024-10-18 ASSESSMENT — PAIN DESCRIPTION - DESCRIPTORS: DESCRIPTORS: SHARP

## 2024-10-18 NOTE — ANESTHESIA PRE PROCEDURE
Department of Anesthesiology  Preprocedure Note       Name:  Melida Fischer   Age:  69 y.o.  :  1955                                          MRN:  [id number]         Date:  10/18/2024      Surgeon: Surgeon(s):  Chris Claros MD    Procedure: Procedure(s):  SACROILIAC JOINT FUSION PERCUTANEOUS    Medications prior to admission:   Prior to Admission medications    Medication Sig Start Date End Date Taking? Authorizing Provider   Magnesium 400 MG CAPS Take by mouth   Yes Kyle Weller MD   doxycycline hyclate (VIBRA-TABS) 100 MG tablet Take 1 tablet by mouth every morning 24  Yes Kyle Weller MD   gabapentin (NEURONTIN) 100 MG capsule TAKE 1 CAPSULE BY MOUTH IN THE EVENING 8/3/22  Yes Kyle Weller MD   levothyroxine (SYNTHROID) 88 MCG tablet  22  Yes ProviderKyle MD   Dextran 70-Hypromellose (LUBRICANT EYE DROPS, PF, OP) Apply to eye   Yes ProviderKyle MD   NONFORMULARY Place 2 drops into both eyes every 4 hours as needed (moisturizing eye drop)   Yes ProviderKyle MD   furosemide (LASIX) 20 MG tablet daily  22  Yes ProviderKyle MD   fluocinonide (LIDEX) 0.05 % cream Apply topically 2 times daily. 20  Yes Nneka Wallace DPM   calcium carbonate (OYSTER SHELL CALCIUM 500 MG) 1250 (500 Ca) MG tablet Take 1 tablet by mouth   Yes Kyle Weller MD   fenofibrate (TRICOR) 145 MG tablet TAKE 1 TABLET BY MOUTH ONE TIME A DAY WITH a MEAL 19  Yes ProviderKyle MD   hydrOXYzine (ATARAX) 10 MG tablet Take 1 tablet by mouth daily as needed 18  Yes ProviderKyle MD   potassium chloride (KLOR-CON M) 10 MEQ extended release tablet Take 1 tablet by mouth 2 times daily 2 tablets in am & pm 5/10/19  Yes Kyle Weller MD   vilazodone HCl (VIIBRYD) 40 MG TABS Take 1 tablet by mouth daily   Yes Kyle Weller MD   zolpidem (AMBIEN) 10 MG tablet Take 1 tablet by mouth nightly as needed.   Yes Provider

## 2024-10-18 NOTE — ANESTHESIA POSTPROCEDURE EVALUATION
Department of Anesthesiology  Postprocedure Note    Patient: Melida Fischer  MRN: [id number]  YOB: 1955  Date of evaluation: 10/18/2024    Procedure Summary       Date: 10/18/24 Room / Location: [address]    Anesthesia Start: 0739 Anesthesia Stop: 0904    Procedure: SACROILIAC JOINT FUSION PERCUTANEOUS (Left) Diagnosis:       SI (sacroiliac) joint dysfunction      (SI (sacroiliac) joint dysfunction [M53.3])    Surgeons: Chris Claros MD Responsible Provider: Anurag Hutchins MD    Anesthesia Type: general ASA Status: 3            Anesthesia Type: No value filed.    Soto Phase I: Soto Score: 8    Soto Phase II: Soto Score: 10    Anesthesia Post Evaluation    Patient location during evaluation: PACU  Patient participation: complete - patient participated  Level of consciousness: awake and alert  Airway patency: patent  Nausea & Vomiting: no nausea and no vomiting  Cardiovascular status: hemodynamically stable  Respiratory status: acceptable  Hydration status: euvolemic  Pain management: adequate    No notable events documented.

## 2024-10-18 NOTE — DISCHARGE INSTRUCTIONS
PWB LLE x 3 wks  Dry dressing changes daily x 1wk, start in 3 days  Stiches are dissolvable and do not need to be removed  Call for any fevers, wound redness, swelling or drainage after 4 days [phone]  May shower in 3 days          Chris Claros MD  [facility]  Spine Surgeon  [phone]

## 2024-10-18 NOTE — H&P
Medical History:   Diagnosis Date    Allergic rhinitis      Anemia      Anxiety      Asthma      Bipolar disorder (CMS-HCC)      Cataracts, bilateral       removed    Cellulitis of finger of left hand 09/11/2018    Chest pain      Chronic constipation      Chronic kidney disease       kidney stone/stage 3    Colon polyp      Combined pyramidal-extrapyramidal syndrome      Dental disease       no teeth    Depression      Endometriosis      Falls frequently      Fibromyalgia, primary      Fractures       toe    GERD (gastroesophageal reflux disease)      Hard to intubate       small airway    Herpes zoster      HLD (hyperlipidemia)      Hypothyroidism      Migraines      Mitral valve prolapse      Osteoarthritis       DDD    Peptic ulceration      Pneumonia      Rash       underneath breasts    Rectal prolapse 2022    Scarlet fever      Seizures (CMS-HCC)       staring seizures/last occur 9/19/2021    Sjogren's disease (CMS-HCC)       \"I don't have it, just every symptom\"    Upper GI bleed 08/06/2021    Urinary tract infection      Visual impairment       wears glasses         Past Surgical History        Past Surgical History:   Procedure Laterality Date    APPENDECTOMY         done during another surgery    ARM SURGERY Left      ARTHROSCOPY MENISECTOMY KNEE LT MEDIAL Left 6/29/2016     Performed by Peterson Fajardo MD at [facility]    BACK SURGERY   12/11/2018    BACK SURGERY   11/19/2019     revision of previous L4-5 fusion, L4-S1 posterior pedicle screw fixation and posterolateral fusion    BLEPHAROPLASTY Bilateral      BREAST BIOPSY Left 1996    CATARACT EXTRACTION Bilateral      CERVICAL FUSION   2003, 2007, 2014    CERVICAL FUSION   01/18/2022     thoracic decompression and fusion    COLONOSCOPY   01/22/2016     other provider     COLONOSCOPY N/A 9/21/2021     Performed by Martin Paulino MD at [facility] AMBULATORY SURGERY    EXAM UNDER ANESTHESIA RECTUM N/A 9/21/2021     Performed by Martin Paulino MD  APRN-CNP at 9/30/2024  3:11 PM

## 2024-10-18 NOTE — BRIEF OP NOTE
Brief Postoperative Note      Patient: Melida Fischer  YOB: 1955  MRN: [id number]    Date of Procedure: 10/18/2024    Pre-Op Diagnosis Codes:      * SI (sacroiliac) joint dysfunction [M53.3] Left    Post-Op Diagnosis: Same       Procedure(s):  SACROILIAC JOINT FUSION PERCUTANEOUS left    Surgeon(s):  Mona Claros MD    Assistant:  Surgical Assistant: Wally Malone    Anesthesia: General    Estimated Blood Loss (mL): Minimal    Complications: None    Specimens:   * No specimens in log *    Implants:  Implant Name Type Inv. Item Serial No.  Lot No. LRB No. Used Action   IMPL SPINE I-FUSE 3D 7.0X45 MM 3D - [id number] Spine IMPL SPINE I-FUSE 3D 7.0X45 MM 3D  [employer] [id number] Left 1 Implanted   IMPL SPINE I-FUSE 3D 7.0X45 MM 3D - [id number] Spine IMPL SPINE I-FUSE 3D 7.0X45 MM 3D  [employer] [id number] Left 1 Implanted         Drains: * No LDAs found *    Findings:  Infection Present At Time Of Surgery (PATOS) (choose all levels that have infection present):  No infection present      Electronically signed by MONA CLAROS MD on 10/18/2024 at 8:34 AM

## 2024-10-18 NOTE — PROGRESS NOTES
Review of Systems   Constitutional: Negative for activity change, chills and fever. Eyes: Negative for pain, redness and visual disturbance. Respiratory: Positive for cough. Negative for shortness of breath and wheezing. Cardiovascular: Positive for leg swelling. Negative for chest pain. Gastrointestinal: Negative for abdominal pain, nausea and vomiting. Genitourinary: Negative for difficulty urinating, dysuria, frequency, hematuria, pelvic pain, vaginal bleeding and vaginal discharge. Musculoskeletal: Positive for joint swelling and myalgias. Negative for back pain. Skin: Negative for color change and rash. Neurological: Negative for dizziness, tremors and numbness. Hematological: Negative for adenopathy. Does not bruise/bleed easily. No

## 2024-10-20 NOTE — OP NOTE
Operative Note      Patient: Melida Fischer  YOB: 1955  MRN: [id number]    Date of Procedure: 10/18/2024    SURGEON:  Chris Claros MD.     ANESTHESIA:  General endotracheal anesthesia.     PREOPERATIVE DIAGNOSIS:  left sacroiliac dysfunction.     POSTOPERATIVE DIAGNOSIS:  left sacroiliac dysfunction.     PROCEDURE:  1. left minimally-invasive sacroiliac joint fusion utilizing      the iFuse implant system.  2. Intraoperative use of C-arm fluoroscopy.     ESTIMATED BLOOD LOSS:  10 mL.     FLUIDS:  Per anesthesia record.     COMPLICATIONS:  None.     INDICATIONS:  This is a pleasant 69-year-old female  with  history of SI joint dysfunction and a  chronic pain  situation. She had done extensive conservative management in terms  of workup for her low back as well as her sacroiliac joints. She  had multiple injections in the sacroiliac joints which did  confirm relived pain. She was having the  continued pain on the left side and had failed conservative  management. It was discussed with him the option of performing  sacroiliac joint fusion on that left side as well. Risk and  benefits were discussed including bleeding, infection, injury to  nerves, vessels, anesthetic risk, the need for possible further  future surgery, as well as the possibility for continued pain,  continued symptoms, possible nonunion. She did understand all  these risks and did wish to proceed and informed consent was  obtained.     DESCRIPTION OF PROCEDURE:  The patient was taken to the operating  room, kept supine on his bed. She was intubated, placed under  general anesthesia by the anesthesiologist. She was given  preoperative antibiotic prophylaxis. She was then placed prone on  the Cedric table with towel beneath his chest and pelvis. All  bony prominences were padded. Eyes were kept free of any  pressure. Brachial plexus and elbows were padded as well. She was  then taped in this position on to the table and the right

## (undated) DEVICE — GOWN,AURORA,NON-REINFORCED,2XL: Brand: MEDLINE

## (undated) DEVICE — DRESSING PETRO W3XL8IN OIL EMUL N ADH GZ KNIT IMPREG CELOS

## (undated) DEVICE — MERCY HEALTH ST CHARLES: Brand: MEDLINE INDUSTRIES, INC.

## (undated) DEVICE — STAZ MINOR BASIN: Brand: MEDLINE INDUSTRIES, INC.

## (undated) DEVICE — GAUZE, BORDER, 3"X6", 1.5"X4"PAD, STERIL: Brand: MEDLINE INDUSTRIES, INC.

## (undated) DEVICE — SUTURE VICRYL SZ 0 L36IN ABSRB UD L36MM CT-1 1/2 CIR J946H

## (undated) DEVICE — C-ARMOR C-ARM EQUIPMENT COVERS CLEAR STERILE UNIVERSAL FIT 12 PER CASE: Brand: C-ARMOR

## (undated) DEVICE — CUSHION PRONEVIEW L HD NK FOAM

## (undated) DEVICE — DRILL SURG DIA7MM CANN

## (undated) DEVICE — APPLICATOR MEDICATED 26 CC SOLUTION HI LT ORNG CHLORAPREP

## (undated) DEVICE — PIN FIX DIA3.2MM EXCHG DISP FOR IFUSE IMPL SYS
Type: IMPLANTABLE DEVICE | Site: HIP | Status: NON-FUNCTIONAL
Removed: 2024-08-09

## (undated) DEVICE — SYRINGE MED 20ML STD CLR PLAS LUERLOCK TIP N CTRL DISP

## (undated) DEVICE — GLOVE ORANGE PI 8 1/2   MSG9085

## (undated) DEVICE — LIQUIBAND RAPID ADHESIVE 36/CS 0.8ML: Brand: MEDLINE

## (undated) DEVICE — LARGE TEAR CROSS CUT RASP, 7MM X 14MM: Brand: MICROAIRE®

## (undated) DEVICE — 4-PORT MANIFOLD: Brand: NEPTUNE 2

## (undated) DEVICE — HYPODERMIC SAFETY NEEDLE: Brand: MAGELLAN

## (undated) DEVICE — SUTURE MONOCRYL SZ 4-0 L27IN ABSRB UD L19MM PS-2 1/2 CIR PRIM Y426H

## (undated) DEVICE — SPONGE LAP W18XL18IN WHT COT 4 PLY FLD STRUNG RADPQ DISP ST 2 PER PACK

## (undated) DEVICE — BANDAGE COMPR W4INXL5YD WHT BGE POLY COT M E WRP WV HK AND

## (undated) DEVICE — SUTURE PROL SZ 4-0 L18IN NONABSORBABLE BLU L19MM PS-2 3/8 8682G

## (undated) DEVICE — DRESSING PETRO W3XL3IN OIL EMUL N ADH GZ KNIT IMPREG CELOS

## (undated) DEVICE — C-ARM: Brand: UNBRANDED

## (undated) DEVICE — NEEDLE HYPO 25GA L1.5IN BLU POLYPR HUB S STL REG BVL STR

## (undated) DEVICE — SUTURE VCRL + SZ 2-0 L27IN ABSRB WHT SH 1/2 CIR TAPERCUT VCP417H

## (undated) DEVICE — GLOVE SURG SZ 85 CRM LTX FREE POLYISOPRENE POLYMER BEAD ANTI

## (undated) DEVICE — SMALL OSC. SAW BLADE, 5.5MM X 18.5MM X 0.38MM: Brand: MICROAIRE®

## (undated) DEVICE — SHEET,DRAPE,70X100,STERILE: Brand: MEDLINE

## (undated) DEVICE — FORCEPS BX L240CM WRK CHN 2.8MM STD CAP W/ NDL MIC MESH

## (undated) DEVICE — SYRINGE IRRIG 60ML SFT PLIABLE BLB EZ TO GRP 1 HND USE W/

## (undated) DEVICE — MARKER,SKIN,WI/RULER AND LABELS: Brand: MEDLINE

## (undated) DEVICE — STRAP,POSITIONING,KNEE/BODY,FOAM,4X60": Brand: MEDLINE

## (undated) DEVICE — SINGLE PORT MANIFOLD: Brand: NEPTUNE 2

## (undated) DEVICE — ENDO KIT W/SYRINGE: Brand: MEDLINE INDUSTRIES, INC.

## (undated) DEVICE — INTENDED FOR TISSUE SEPARATION, AND OTHER PROCEDURES THAT REQUIRE A SHARP SURGICAL BLADE TO PUNCTURE OR CUT.: Brand: BARD-PARKER ® CARBON RIB-BACK BLADES

## (undated) DEVICE — 3M™ IOBAN™ 2 ANTIMICROBIAL INCISE DRAPE 6650EZ: Brand: IOBAN™ 2

## (undated) DEVICE — BITEBLOCK 54FR W/ DENT RIM BLOX

## (undated) DEVICE — DRAPE,REIN 53X77,STERILE: Brand: MEDLINE

## (undated) DEVICE — Device

## (undated) DEVICE — SUTURE VICRYL + SZ 2-0 L36IN ABSRB UD L36MM CT-1 1/2 CIR VCP945H

## (undated) DEVICE — DEFENDO AIR WATER SUCTION AND BIOPSY VALVE KIT FOR  OLYMPUS: Brand: DEFENDO AIR/WATER/SUCTION AND BIOPSY VALVE

## (undated) DEVICE — DRAPE,TOWEL,LARGE,INVISISHIELD: Brand: MEDLINE

## (undated) DEVICE — SHEET, ORTHO, SPLIT, STERILE: Brand: MEDLINE

## (undated) DEVICE — SUTURE VCRL + SZ 4-0 L27IN ABSRB WHT FS-2 3/8 CIR REV CUT VCP422H

## (undated) DEVICE — BLADE,CARBON-STEEL,15,STRL,DISPOSABLE,TB: Brand: MEDLINE

## (undated) DEVICE — SYRINGE, LUER LOCK, 10ML: Brand: MEDLINE

## (undated) DEVICE — SUTURE PROL SZ 4-0 L18IN NONABSORBABLE BLU L19MM FS-2 3/8 8683G

## (undated) DEVICE — GUIDEPIN ORTH DIA3.2MM DISP FOR IFUSE IMPL SYS

## (undated) DEVICE — PRECISION THIN (5.5 X 0.38 X 18.0MM)

## (undated) DEVICE — MHPB HAND AND FOOT PACK: Brand: MEDLINE INDUSTRIES, INC.

## (undated) DEVICE — GLOVE SURG SZ 85 L12IN FNGR THK79MIL GRN LTX FREE

## (undated) DEVICE — GOWN,SIRUS,POLYRNF,BRTHSLV,2XL,18/CS: Brand: MEDLINE

## (undated) DEVICE — ZIMMER® STERILE DISPOSABLE TOURNIQUET CUFF WITH PLC, DUAL PORT, SINGLE BLADDER, 18 IN. (46 CM)